# Patient Record
Sex: FEMALE | Race: WHITE | Employment: UNEMPLOYED | URBAN - METROPOLITAN AREA
[De-identification: names, ages, dates, MRNs, and addresses within clinical notes are randomized per-mention and may not be internally consistent; named-entity substitution may affect disease eponyms.]

---

## 2017-08-15 ENCOUNTER — HOSPITAL ENCOUNTER (EMERGENCY)
Age: 82
Discharge: HOME | End: 2017-08-15
Attending: EMERGENCY MEDICINE
Payer: MEDICARE

## 2017-08-15 ENCOUNTER — APPOINTMENT (OUTPATIENT)
Dept: GENERAL RADIOLOGY | Age: 82
End: 2017-08-15
Attending: EMERGENCY MEDICINE
Payer: MEDICARE

## 2017-08-15 VITALS
RESPIRATION RATE: 16 BRPM | TEMPERATURE: 98.1 F | HEIGHT: 60 IN | SYSTOLIC BLOOD PRESSURE: 169 MMHG | HEART RATE: 69 BPM | DIASTOLIC BLOOD PRESSURE: 77 MMHG | OXYGEN SATURATION: 98 % | BODY MASS INDEX: 27.29 KG/M2 | WEIGHT: 139 LBS

## 2017-08-15 DIAGNOSIS — R00.2 PALPITATIONS: Primary | ICD-10-CM

## 2017-08-15 LAB
ANION GAP BLD CALC-SCNC: 9 MMOL/L (ref 7–16)
ATRIAL RATE: 63 BPM
BASOPHILS # BLD AUTO: 0 K/UL (ref 0–0.2)
BASOPHILS # BLD: 0 % (ref 0–2)
BUN SERPL-MCNC: 10 MG/DL (ref 8–23)
CALCIUM SERPL-MCNC: 8.9 MG/DL (ref 8.3–10.4)
CALCULATED P AXIS, ECG09: 64 DEGREES
CALCULATED R AXIS, ECG10: 29 DEGREES
CALCULATED T AXIS, ECG11: 64 DEGREES
CHLORIDE SERPL-SCNC: 97 MMOL/L (ref 98–107)
CO2 SERPL-SCNC: 27 MMOL/L (ref 21–32)
CREAT SERPL-MCNC: 0.5 MG/DL (ref 0.6–1)
DIAGNOSIS, 93000: NORMAL
DIFFERENTIAL METHOD BLD: ABNORMAL
EOSINOPHIL # BLD: 0.1 K/UL (ref 0–0.8)
EOSINOPHIL NFR BLD: 1 % (ref 0.5–7.8)
ERYTHROCYTE [DISTWIDTH] IN BLOOD BY AUTOMATED COUNT: 13 % (ref 11.9–14.6)
GLUCOSE SERPL-MCNC: 141 MG/DL (ref 65–100)
HCT VFR BLD AUTO: 38 % (ref 35.8–46.3)
HGB BLD-MCNC: 13.2 G/DL (ref 11.7–15.4)
IMM GRANULOCYTES # BLD: 0 K/UL (ref 0–0.5)
IMM GRANULOCYTES NFR BLD AUTO: 0.2 % (ref 0–5)
INR PPP: 1 (ref 0.9–1.2)
LYMPHOCYTES # BLD AUTO: 14 % (ref 13–44)
LYMPHOCYTES # BLD: 0.9 K/UL (ref 0.5–4.6)
MAGNESIUM SERPL-MCNC: 1.9 MG/DL (ref 1.8–2.4)
MCH RBC QN AUTO: 30.8 PG (ref 26.1–32.9)
MCHC RBC AUTO-ENTMCNC: 34.7 G/DL (ref 31.4–35)
MCV RBC AUTO: 88.6 FL (ref 79.6–97.8)
MONOCYTES # BLD: 0.6 K/UL (ref 0.1–1.3)
MONOCYTES NFR BLD AUTO: 9 % (ref 4–12)
NEUTS SEG # BLD: 4.9 K/UL (ref 1.7–8.2)
NEUTS SEG NFR BLD AUTO: 76 % (ref 43–78)
P-R INTERVAL, ECG05: 160 MS
PLATELET # BLD AUTO: 204 K/UL (ref 150–450)
PMV BLD AUTO: 9.1 FL (ref 10.8–14.1)
POTASSIUM SERPL-SCNC: 4 MMOL/L (ref 3.5–5.1)
PROTHROMBIN TIME: 10.5 SEC (ref 9.6–12)
Q-T INTERVAL, ECG07: 408 MS
QRS DURATION, ECG06: 92 MS
QTC CALCULATION (BEZET), ECG08: 417 MS
RBC # BLD AUTO: 4.29 M/UL (ref 4.05–5.25)
SODIUM SERPL-SCNC: 133 MMOL/L (ref 136–145)
VENTRICULAR RATE, ECG03: 63 BPM
WBC # BLD AUTO: 6.5 K/UL (ref 4.3–11.1)

## 2017-08-15 PROCEDURE — 93005 ELECTROCARDIOGRAM TRACING: CPT | Performed by: EMERGENCY MEDICINE

## 2017-08-15 PROCEDURE — 85025 COMPLETE CBC W/AUTO DIFF WBC: CPT | Performed by: EMERGENCY MEDICINE

## 2017-08-15 PROCEDURE — 99284 EMERGENCY DEPT VISIT MOD MDM: CPT | Performed by: EMERGENCY MEDICINE

## 2017-08-15 PROCEDURE — 85610 PROTHROMBIN TIME: CPT | Performed by: EMERGENCY MEDICINE

## 2017-08-15 PROCEDURE — 80048 BASIC METABOLIC PNL TOTAL CA: CPT | Performed by: EMERGENCY MEDICINE

## 2017-08-15 PROCEDURE — 83735 ASSAY OF MAGNESIUM: CPT | Performed by: EMERGENCY MEDICINE

## 2017-08-15 PROCEDURE — 71010 XR CHEST PORT: CPT

## 2017-08-15 RX ORDER — BENAZEPRIL HYDROCHLORIDE 20 MG/1
5 TABLET ORAL DAILY
COMMUNITY
End: 2019-10-17

## 2017-08-15 NOTE — ED PROVIDER NOTES
HPI Comments: 51-year-old female presents with 5 nights of intermittent palpitations and dizziness. Spells last 10-15 minutes and occur mainly at nighttime. She has tingling to the fingertips and slight dyspnea. Patient has a history of atrial fibrillation in the past, and describes the above-mentioned spells as her heart \"racing\"      Patient is a 80 y.o. female presenting with dizziness. The history is provided by the patient. Dizziness   This is a new problem. The current episode started more than 2 days ago. The problem has not changed since onset. There was no focality noted. There has been no fever. Associated symptoms include shortness of breath. Pertinent negatives include no chest pain, no vomiting, no altered mental status, no confusion, no headaches and no nausea. Past Medical History:   Diagnosis Date    Atrial fibrillation ([address]     CAD (coronary artery disease)     afib    Chest pain 9/13/2016    Gastrointestinal disorder irritable bowel    Hypertension        Past Surgical History:   Procedure Laterality Date    HX CHOLECYSTECTOMY      Colostomy reversal    HX ORTHOPAEDIC      left hip replacement, rt pelvic fx    HX OTHER SURGICAL      Extensive abdominal surgical history         History reviewed. No pertinent family history. Social History     Social History    Marital status:      Spouse name: N/A    Number of children: N/A    Years of education: N/A     Occupational History    Not on file. Social History Main Topics    Smoking status: Never Smoker    Smokeless tobacco: Not on file    Alcohol use No    Drug use: Not on file    Sexual activity: No     Other Topics Concern    Not on file     Social History Narrative         ALLERGIES: Sulfa (sulfonamide antibiotics)    Review of Systems   Constitutional: Negative for chills and fever. HENT: Negative for rhinorrhea and sore throat. Eyes: Negative for discharge and redness.    Respiratory: Positive for shortness of breath. Negative for cough. Cardiovascular: Positive for palpitations. Negative for chest pain. Gastrointestinal: Negative for abdominal pain, diarrhea, nausea and vomiting. Musculoskeletal: Negative for arthralgias and back pain. Skin: Negative for rash. Neurological: Positive for dizziness, light-headedness and numbness. Negative for headaches. Psychiatric/Behavioral: Negative for confusion. All other systems reviewed and are negative. Vitals:    08/15/17 1136 08/15/17 1152 08/15/17 1211   BP: 168/72 162/72    Pulse: 63 62    Resp: 16     Temp: 98 °F (36.7 °C)     SpO2: 97%  97%   Weight: 63 kg (139 lb)     Height: 5' (1.524 m)              Physical Exam   Constitutional: She is oriented to person, place, and time. She appears well-developed and well-nourished. No distress. HENT:   Head: Normocephalic and atraumatic. Eyes: Conjunctivae and EOM are normal. Pupils are equal, round, and reactive to light. Right eye exhibits no discharge. Left eye exhibits no discharge. No scleral icterus. Neck: Normal range of motion. Neck supple. Cardiovascular: Normal rate, regular rhythm and normal heart sounds. Exam reveals no gallop. No murmur heard. Pulmonary/Chest: Effort normal and breath sounds normal. No respiratory distress. She has no wheezes. She has no rales. Abdominal: Soft. Bowel sounds are normal. There is no tenderness. There is no guarding. Musculoskeletal: Normal range of motion. She exhibits no edema. Neurological: She is alert and oriented to person, place, and time. She exhibits normal muscle tone. cni 2-12 grossly   Skin: Skin is warm and dry. She is not diaphoretic. Psychiatric: She has a normal mood and affect. Her behavior is normal.   Nursing note and vitals reviewed.        MDM  Number of Diagnoses or Management Options  Palpitations:   Diagnosis management comments: Medical decision making note:  Palpitations, intermittent, nonsustained  Negative workup in the ER  Case discussed with cardiology, proceed to office for 48 hour Holter placement  This concludes the \"medical decision making note\" part of this emergency department visit note.       ED Course       Procedures

## 2017-08-15 NOTE — DISCHARGE INSTRUCTIONS
Go to the office at [facility] cardiology, either the one in [facility], or the one on an [address] near the \"[place] on [address]. They will get you hooked up to a heart monitor you can wear for the next 48 hours    follow up with Dr. Karyle Reilly  Return to the ER if worse    Continue all medicines as currently prescribed           Palpitations: Care Instructions  Your Care Instructions    Heart palpitations are the uncomfortable sensation that your heart is beating fast or irregularly. You might feel pounding or fluttering in your chest. It might feel like your heart is skipping a beat. Although palpitations may be caused by a heart problem, they also occur because of stress, fatigue, or use of alcohol, caffeine, or nicotine. Many medicines, including diet pills, antihistamines, decongestants, and some herbal products, can cause heart palpitations. Nearly everyone has palpitations from time to time. Depending on your symptoms, your doctor may need to do more tests to try to find the cause of your palpitations. Follow-up care is a key part of your treatment and safety. Be sure to make and go to all appointments, and call your doctor if you are having problems. It's also a good idea to know your test results and keep a list of the medicines you take. How can you care for yourself at home? · Avoid caffeine, nicotine, and excess alcohol. · Do not take illegal drugs, such as methamphetamines and cocaine. · Do not take weight loss or diet medicines unless you talk with your doctor first.  · Get plenty of sleep. · Do not overeat. · If you have palpitations again, take deep breaths and try to relax. This may slow a racing heart. · If you start to feel lightheaded, lie down to avoid injuries that might result if you pass out and fall down. · Keep a record of your palpitations and bring it to your next doctor's appointment. Write down:  ¨ The date and time. ¨ Your pulse.  (If your heart is beating fast, it may be hard to count your pulse.)  ¨ What you were doing when the palpitations started. ¨ How long the palpitations lasted. ¨ Any other symptoms. · If an activity causes palpitations, slow down or stop. Talk to your doctor before you do that activity again. · Take your medicines exactly as prescribed. Call your doctor if you think you are having a problem with your medicine. When should you call for help? Call 911 anytime you think you may need emergency care. For example, call if:  · You passed out (lost consciousness). · You have symptoms of a heart attack. These may include:  ¨ Chest pain or pressure, or a strange feeling in the chest.  ¨ Sweating. ¨ Shortness of breath. ¨ Pain, pressure, or a strange feeling in the back, neck, jaw, or upper belly or in one or both shoulders or arms. ¨ Lightheadedness or sudden weakness. ¨ A fast or irregular heartbeat. After you call 911, the  may tell you to chew 1 adult-strength or 2 to 4 low-dose aspirin. Wait for an ambulance. Do not try to drive yourself. · You have symptoms of a stroke. These may include:  ¨ Sudden numbness, tingling, weakness, or loss of movement in your face, arm, or leg, especially on only one side of your body. ¨ Sudden vision changes. ¨ Sudden trouble speaking. ¨ Sudden confusion or trouble understanding simple statements. ¨ Sudden problems with walking or balance. ¨ A sudden, severe headache that is different from past headaches. Call your doctor now or seek immediate medical care if:  · You have heart palpitations and:  ¨ Are dizzy or lightheaded, or you feel like you may faint. ¨ Have new or increased shortness of breath. Watch closely for changes in your health, and be sure to contact your doctor if:  · You continue to have heart palpitations. Where can you learn more? Go to http://loretta-ro.info/. Enter R508 in the search box to learn more about \"Palpitations: Care Instructions. \"  Current as of: April 3, 2017  Content Version: 11.3  © 6967-6126 [employer]. Care instructions adapted under license by [employer] (which disclaims liability or warranty for this information). If you have questions about a medical condition or this instruction, always ask your healthcare professional. [address] any warranty or liability for your use of this information. Cardiac Arrhythmia: Care Instructions  Your Care Instructions    A cardiac arrhythmia is a change in the normal rhythm of the heart. Your heart may beat too fast or too slow or beat with an irregular or skipping rhythm. A change in the heart's rhythm may feel like a really strong heartbeat or a fluttering in your chest. A severe heart rhythm problem can keep the body from getting the blood it needs. This can result in shortness of breath, lightheadedness, and fainting. You may take medicine to treat your condition. Your doctor may recommend a pacemaker or recommend catheter ablation to destroy small parts of the heart that are causing a rhythm problem. Another possible treatment is an implantable cardioverter-defibrillator (ICD). An ICD is a device that gives the heart a shock to return the heart to a normal rhythm. Follow-up care is a key part of your treatment and safety. Be sure to make and go to all appointments, and call your doctor if you are having problems. It's also a good idea to know your test results and keep a list of the medicines you take. How can you care for yourself at home? General care  · Be safe with medicines. Take your medicines exactly as prescribed. Call your doctor if you think you are having a problem with your medicine. You will get more details on the specific medicines your doctor prescribes. · If you received a pacemaker or an ICD, you will get a fact sheet about it. · Wear medical alert jewelry that says you have an abnormal heart rhythm.  You can buy this at most drugstores. Lifestyle changes  · Eat a heart-healthy diet. · Stay at a healthy weight. Lose weight if you need to. · Avoid nicotine, too much alcohol, and illegal drugs (meth, speed, and cocaine). Also, get enough sleep and do not overeat. · Ask your doctor whether you can take over-the-counter medicines (such as decongestants). These can make your heart beat fast.  · Talk to your doctor about any limits to activities, such as driving, or tasks where you use power tools or ladders. Activity  · Start light exercise if your doctor says you can. Even a small amount will help you get stronger, have more energy, and manage your stress. · If your doctor recommends it, get more exercise. Walking is a good choice. Bit by bit, increase the amount you walk every day. Try for at least 30 minutes on most days of the week. You also may want to swim, bike, or do other activities. · When you exercise, watch for signs that your heart is working too hard. You are pushing too hard if you cannot talk while you exercise. If you become short of breath or dizzy or have chest pain, sit down and rest.  · Check your pulse daily. Place two fingers on the artery at the palm side of your wrist, in line with your thumb. If your heartbeat seems uneven, talk to your doctor. When should you call for help? Call 911 anytime you think you may need emergency care. For example, call if:  · You passed out (lost consciousness). · You have symptoms of a heart attack. These may include:  ¨ Chest pain or pressure, or a strange feeling in the chest.  ¨ Sweating. ¨ Shortness of breath. ¨ Nausea or vomiting. ¨ Pain, pressure, or a strange feeling in the back, neck, jaw, or upper belly or in one or both shoulders or arms. ¨ Lightheadedness or sudden weakness. ¨ A fast or irregular heartbeat. After you call 911, the  may tell you to chew 1 adult-strength or 2 to 4 low-dose aspirin. Wait for an ambulance.  Do not try to drive yourself. · You have signs of a stroke. These include:  ¨ Sudden numbness, paralysis, or weakness in your face, arm, or leg, especially on only one side of your body. ¨ New problems with walking or balance. ¨ Sudden vision changes. ¨ Drooling or slurred speech. ¨ New problems speaking or understanding simple statements, or feeling confused. ¨ A sudden, severe headache that is different from past headaches. Call your doctor now or seek immediate medical care if:  · You are dizzy or lightheaded, or you feel like you may faint. · You have new or increased shortness of breath. · You had surgery and you have signs of infection, such as:  ¨ Increased pain, swelling, warmth, or redness. ¨ Red streaks leading from the cut (incision). ¨ Pus draining from the incision. ¨ A fever. Watch closely for changes in your health, and be sure to contact your doctor if:  · You do not get better as expected. Where can you learn more? Go to http://loretta-ro.info/. Enter N782 in the search box to learn more about \"Cardiac Arrhythmia: Care Instructions. \"  Current as of: May 5, 2016  Content Version: 11.3  © 2435-1442 [employer]. Care instructions adapted under license by [employer] (which disclaims liability or warranty for this information). If you have questions about a medical condition or this instruction, always ask your healthcare professional. [place] [address] any warranty or liability for your use of this information.

## 2017-08-15 NOTE — ED TRIAGE NOTES
Pt states she has had dizziness for about five days and has a hx of Afib. States she was back in a normal rhythm for awhile but feels she may be back in the rhythm now. Pt of [facility] but has not had to see them in about one year.

## 2017-08-15 NOTE — ED NOTES
Pt states she feels dizzy at night, She states during the day she is fine. She states after she takes her pills, she feels fine and is not dizzy.

## 2017-09-14 ENCOUNTER — APPOINTMENT (OUTPATIENT)
Dept: CT IMAGING | Age: 82
End: 2017-09-14
Attending: EMERGENCY MEDICINE
Payer: MEDICARE

## 2017-09-14 ENCOUNTER — HOSPITAL ENCOUNTER (EMERGENCY)
Age: 82
Discharge: HOME | End: 2017-09-14
Attending: EMERGENCY MEDICINE
Payer: MEDICARE

## 2017-09-14 VITALS
BODY MASS INDEX: 26.43 KG/M2 | RESPIRATION RATE: 19 BRPM | WEIGHT: 140 LBS | SYSTOLIC BLOOD PRESSURE: 175 MMHG | HEIGHT: 61 IN | TEMPERATURE: 98.1 F | HEART RATE: 71 BPM | OXYGEN SATURATION: 95 % | DIASTOLIC BLOOD PRESSURE: 71 MMHG

## 2017-09-14 DIAGNOSIS — M54.50 ACUTE RIGHT-SIDED LOW BACK PAIN WITHOUT SCIATICA: Primary | ICD-10-CM

## 2017-09-14 DIAGNOSIS — R10.31 ABDOMINAL PAIN, RIGHT LOWER QUADRANT: ICD-10-CM

## 2017-09-14 LAB
ALBUMIN SERPL-MCNC: 3.7 G/DL (ref 3.2–4.6)
ALBUMIN/GLOB SERPL: 0.9 {RATIO} (ref 1.2–3.5)
ALP SERPL-CCNC: 110 U/L (ref 50–136)
ALT SERPL-CCNC: 25 U/L (ref 12–65)
ANION GAP SERPL CALC-SCNC: 7 MMOL/L (ref 7–16)
AST SERPL-CCNC: 26 U/L (ref 15–37)
BASOPHILS # BLD: 0 K/UL (ref 0–0.2)
BASOPHILS NFR BLD: 0 % (ref 0–2)
BILIRUB SERPL-MCNC: 0.5 MG/DL (ref 0.2–1.1)
BUN SERPL-MCNC: 10 MG/DL (ref 8–23)
CALCIUM SERPL-MCNC: 8.9 MG/DL (ref 8.3–10.4)
CHLORIDE SERPL-SCNC: 97 MMOL/L (ref 98–107)
CO2 SERPL-SCNC: 27 MMOL/L (ref 21–32)
CREAT SERPL-MCNC: 0.7 MG/DL (ref 0.6–1)
DIFFERENTIAL METHOD BLD: ABNORMAL
EOSINOPHIL # BLD: 0 K/UL (ref 0–0.8)
EOSINOPHIL NFR BLD: 0 % (ref 0.5–7.8)
ERYTHROCYTE [DISTWIDTH] IN BLOOD BY AUTOMATED COUNT: 13.2 % (ref 11.9–14.6)
GLOBULIN SER CALC-MCNC: 4.1 G/DL (ref 2.3–3.5)
GLUCOSE SERPL-MCNC: 186 MG/DL (ref 65–100)
HCT VFR BLD AUTO: 37.9 % (ref 35.8–46.3)
HGB BLD-MCNC: 13 G/DL (ref 11.7–15.4)
IMM GRANULOCYTES # BLD: 0 K/UL (ref 0–0.5)
IMM GRANULOCYTES NFR BLD: 0.5 % (ref 0–5)
LIPASE SERPL-CCNC: 212 U/L (ref 73–393)
LYMPHOCYTES # BLD: 1 K/UL (ref 0.5–4.6)
LYMPHOCYTES NFR BLD: 12 % (ref 13–44)
MCH RBC QN AUTO: 30.9 PG (ref 26.1–32.9)
MCHC RBC AUTO-ENTMCNC: 34.3 G/DL (ref 31.4–35)
MCV RBC AUTO: 90 FL (ref 79.6–97.8)
MONOCYTES # BLD: 0.4 K/UL (ref 0.1–1.3)
MONOCYTES NFR BLD: 5 % (ref 4–12)
NEUTS SEG # BLD: 7.2 K/UL (ref 1.7–8.2)
NEUTS SEG NFR BLD: 83 % (ref 43–78)
PLATELET # BLD AUTO: 236 K/UL (ref 150–450)
PMV BLD AUTO: 9.3 FL (ref 10.8–14.1)
POTASSIUM SERPL-SCNC: 4 MMOL/L (ref 3.5–5.1)
PROT SERPL-MCNC: 7.8 G/DL (ref 6.3–8.2)
RBC # BLD AUTO: 4.21 M/UL (ref 4.05–5.25)
SODIUM SERPL-SCNC: 131 MMOL/L (ref 136–145)
WBC # BLD AUTO: 8.7 K/UL (ref 4.3–11.1)

## 2017-09-14 PROCEDURE — 85025 COMPLETE CBC W/AUTO DIFF WBC: CPT | Performed by: EMERGENCY MEDICINE

## 2017-09-14 PROCEDURE — 81003 URINALYSIS AUTO W/O SCOPE: CPT | Performed by: EMERGENCY MEDICINE

## 2017-09-14 PROCEDURE — 83690 ASSAY OF LIPASE: CPT | Performed by: EMERGENCY MEDICINE

## 2017-09-14 PROCEDURE — 74011636320 HC RX REV CODE- 636/320: Performed by: EMERGENCY MEDICINE

## 2017-09-14 PROCEDURE — 74011250636 HC RX REV CODE- 250/636: Performed by: EMERGENCY MEDICINE

## 2017-09-14 PROCEDURE — 80053 COMPREHEN METABOLIC PANEL: CPT | Performed by: EMERGENCY MEDICINE

## 2017-09-14 PROCEDURE — 96374 THER/PROPH/DIAG INJ IV PUSH: CPT | Performed by: EMERGENCY MEDICINE

## 2017-09-14 PROCEDURE — 74177 CT ABD & PELVIS W/CONTRAST: CPT

## 2017-09-14 PROCEDURE — 99284 EMERGENCY DEPT VISIT MOD MDM: CPT | Performed by: EMERGENCY MEDICINE

## 2017-09-14 PROCEDURE — 74011000258 HC RX REV CODE- 258: Performed by: EMERGENCY MEDICINE

## 2017-09-14 PROCEDURE — 96361 HYDRATE IV INFUSION ADD-ON: CPT | Performed by: EMERGENCY MEDICINE

## 2017-09-14 RX ORDER — SODIUM CHLORIDE 0.9 % (FLUSH) 0.9 %
10 SYRINGE (ML) INJECTION
Status: COMPLETED | OUTPATIENT
Start: 2017-09-14 | End: 2017-09-14

## 2017-09-14 RX ORDER — MORPHINE SULFATE 2 MG/ML
4 INJECTION, SOLUTION INTRAMUSCULAR; INTRAVENOUS ONCE
Status: DISCONTINUED | OUTPATIENT
Start: 2017-09-14 | End: 2017-09-14 | Stop reason: HOSPADM

## 2017-09-14 RX ORDER — POLYETHYLENE GLYCOL 3350 17 G/17G
17 POWDER, FOR SOLUTION ORAL DAILY
Qty: 238 G | Refills: 0 | Status: SHIPPED | OUTPATIENT
Start: 2017-09-14

## 2017-09-14 RX ADMIN — SODIUM CHLORIDE 100 ML: 900 INJECTION, SOLUTION INTRAVENOUS at 10:28

## 2017-09-14 RX ADMIN — SODIUM CHLORIDE 1000 ML: 900 INJECTION, SOLUTION INTRAVENOUS at 09:52

## 2017-09-14 RX ADMIN — IOPAMIDOL 100 ML: 755 INJECTION, SOLUTION INTRAVENOUS at 10:28

## 2017-09-14 RX ADMIN — Medication 10 ML: at 10:28

## 2017-09-14 NOTE — ED PROVIDER NOTES
HPI:  80 female A. Fib, CAD, IBS, diverticulitis, prior bowel resection is here with right lower back pain, abdominal pain that started 3 days ago. Pain worsened with movement. Has not had bowel movement for 3 days. Denies any urinary symptoms. Denies any saddle paresthesia. No weakness in the lower extremity. Non-blood thinner. Denies any nausea, vomiting. ROS  Constitutional: No fever, no chills  Skin: no rash  Eye: No vision changes  ENMT: No sore throat, no congestion  Respiratory: No shortness of breath, no cough  Cardiovascular: No chest pain, no palpitations  Gastrointestinal: No vomiting, no nausea, no diarrhea, + constipation, no rectal bleeding, + abdominal pain  : No dysuria, no hematuria  MSK: + back pain, no muscle pain, no joint pain  Neuro: No headache, no change in mental status, no numbness, no tingling, no weakness  Psych: No anxiety, no depression  Endocrine: No hyperglycemia  All other review of systems positive per history of present illness and the above otherwise negative or noncontributory. Visit Vitals    /88 (BP 1 Location: Left arm, BP Patient Position: At rest)    Pulse 75    Temp 98 °F (36.7 °C)    Resp 19    Ht 5' 1\" (1.549 m)    Wt 63.5 kg (140 lb)    SpO2 97%    BMI 26.45 kg/m2     Past Medical History:   Diagnosis Date    Atrial fibrillation (HCC)     CAD (coronary artery disease)     afib    Chest pain 9/13/2016    Gastrointestinal disorder irritable bowel    Hypertension      Past Surgical History:   Procedure Laterality Date    HX CHOLECYSTECTOMY      Colostomy reversal    HX ORTHOPAEDIC      left hip replacement, rt pelvic fx    HX OTHER SURGICAL      Extensive abdominal surgical history     Prior to Admission Medications   Prescriptions Last Dose Informant Patient Reported? Taking? VIT C/YASMINE AC/LUT/COPPER/ZNOX (PRESERVISION LUTEIN PO)   Yes Yes   Sig: Take  by mouth.    amLODIPine (NORVASC) 5 mg tablet   Yes Yes   Sig: Take 5 mg by mouth daily.   aspirin delayed-release 81 mg tablet   Yes Yes   Sig: Take 325 mg by mouth daily. benazepril (LOTENSIN) 20 mg tablet   Yes Yes   Sig: Take 5 mg by mouth daily. esomeprazole (NEXIUM) 40 mg capsule   Yes Yes   Sig: Take  by mouth daily. linaclotide (LINZESS) 145 mcg cap capsule   Yes Yes   Sig: Take 145 mcg by mouth Daily (before breakfast). metoprolol succinate (TOPROL-XL) 50 mg XL tablet   No Yes   Sig: Take 1 Tab by mouth daily. Indications: HYPERTENSION   Patient taking differently: Take 100 mg by mouth daily. Indications: Hypertension      Facility-Administered Medications: None         Adult Exam   General: alert, no acute distress  Head: normocephalic, atraumatic  ENT: moist mucous membranes  Neck: supple, non-tender; full range of motion  Cardiovascular: regular rate and rhythm, normal peripheral perfusion, no edema  Respiratory: lungs are clear to auscultation; normal respirations; no wheezing, rales or rhonchi  Gastrointestinal: soft, pulsatile mass. She has tenderness to palpation over the right lower abdomen; or tinnitus. no rebound or guarding, no peritoneal signs, no distension. + bowel sounds  Back: no midline cervical, thoracic, lumbar spine tenderness or steps-off. Rt paralumbar pain on palpation. Range of motion is limited due to pain. Straight leg raise Negative   No saddle paresthesia. Sensation and bilateral lower extremities are intact. 2+ and Equal dorsalis pedis, femoral pulses. Bilateral patella and Achilles tendons 2/4 and equal. no spasticity    Musculoskeletal: normal range of motion, normal strength, no gross deformities  Neurological: alert and oriented x 4, no gross focal deficits; normal speech  Psychiatric: cooperative; appropriate mood and affect    MDM: suspect likely musculoskeletal back pain. Patient stated she was cleaning up the sidewalk in front of her apartment because her neighbor left really messy and she had just come in to her apartment.   Low suspicion for dissection. However she is also having constipation, abdominal pain. Given her age group, prior surgery with no bowel movement we'll obtain CT scan for evaluation of diverticulitis. No distention. Low suspicion for obstruction at this time. She also had an focal pain in the right lower quadrant. CT shows a help us evaluate for possible appendicitis. Ct Abd Pelv W Cont    Result Date: 9/14/2017  ABDOMINAL CT WITH CONTRAST: 9/14/2017 Indication: Diverticulitis Comparison: 4/13/2007  100cc of optiray 350   was injected to aid in the detection of abdominal and pelvic pathology. The MAA was adjusted using dose modulation to reduce patient radiation exposure. Findings: The lung bases are clear  . The patient is status post cholecystectomy and pneumobilia is again noted. The adrenal glands, spleen, and pancreas are unremarkable. Kidneys display multiple subcentimeter bilateral renal cysts. There is mild fluid and stool distention of the ascending and transverse colon. There are sutures in the area of the hepatic flexure. Degenerative lumbar scoliosis is noted. There is no significant adenopathy. CT scan of pelvis with contrast: There is streak artifact from the bilateral hip arthroplasties with obscure the pelvic anatomy. The bladder is unremarkable. There is a remote fracture of the right ischial ramus. Patient appears be status post hysterectomy. Mild sigmoid diverticulosis is noted.      IMPRESSION: Status post post ascending partial colectomy, hysterectomy, cholecystectomy, sigmoid diverticulosis, small bilateral renal cysts    Recent Results (from the past 8 hour(s))   CBC WITH AUTOMATED DIFF    Collection Time: 09/14/17  9:37 AM   Result Value Ref Range    WBC 8.7 4.3 - 11.1 K/uL    RBC 4.21 4.05 - 5.25 M/uL    HGB 13.0 11.7 - 15.4 g/dL    HCT 37.9 35.8 - 46.3 %    MCV 90.0 79.6 - 97.8 FL    MCH 30.9 26.1 - 32.9 PG    MCHC 34.3 31.4 - 35.0 g/dL    RDW 13.2 11.9 - 14.6 %    PLATELET 259 438 - 450 K/uL    MPV 9.3 (L) 10.8 - 14.1 FL    DF AUTOMATED      NEUTROPHILS 83 (H) 43 - 78 %    LYMPHOCYTES 12 (L) 13 - 44 %    MONOCYTES 5 4.0 - 12.0 %    EOSINOPHILS 0 (L) 0.5 - 7.8 %    BASOPHILS 0 0.0 - 2.0 %    IMMATURE GRANULOCYTES 0.5 0.0 - 5.0 %    ABS. NEUTROPHILS 7.2 1.7 - 8.2 K/UL    ABS. LYMPHOCYTES 1.0 0.5 - 4.6 K/UL    ABS. MONOCYTES 0.4 0.1 - 1.3 K/UL    ABS. EOSINOPHILS 0.0 0.0 - 0.8 K/UL    ABS. BASOPHILS 0.0 0.0 - 0.2 K/UL    ABS. IMM. GRANS. 0.0 0.0 - 0.5 K/UL   METABOLIC PANEL, COMPREHENSIVE    Collection Time: 09/14/17  9:37 AM   Result Value Ref Range    Sodium 131 (L) 136 - 145 mmol/L    Potassium 4.0 3.5 - 5.1 mmol/L    Chloride 97 (L) 98 - 107 mmol/L    CO2 27 21 - 32 mmol/L    Anion gap 7 7 - 16 mmol/L    Glucose 186 (H) 65 - 100 mg/dL    BUN 10 8 - 23 MG/DL    Creatinine 0.70 0.6 - 1.0 MG/DL    GFR est AA >60 >60 ml/min/1.73m2    GFR est non-AA >60 >60 ml/min/1.73m2    Calcium 8.9 8.3 - 10.4 MG/DL    Bilirubin, total 0.5 0.2 - 1.1 MG/DL    ALT (SGPT) 25 12 - 65 U/L    AST (SGOT) 26 15 - 37 U/L    Alk. phosphatase 110 50 - 136 U/L    Protein, total 7.8 6.3 - 8.2 g/dL    Albumin 3.7 3.2 - 4.6 g/dL    Globulin 4.1 (H) 2.3 - 3.5 g/dL    A-G Ratio 0.9 (L) 1.2 - 3.5     LIPASE    Collection Time: 09/14/17  9:37 AM   Result Value Ref Range    Lipase 212 73 - 393 U/L     Urine dip neg   Labs unremarkable. CT neg. Pathology. Suspect musculoskeletal pain. She has constipation. Recommend Colace and MiraLAX. Her labs are unremarkable. No Signs of any acute intra-abdominal surgical pathology, obstructions or infectious ideology. Low susp for ischemic bowel. We'll discharge home at this time with typical back pain and abdominal pain precautions. Dragon voice recognition software was used to create this note. Although the note has been reviewed and corrected where necessary, additional errors may have been overlooked and remain in the text.

## 2017-09-14 NOTE — DISCHARGE INSTRUCTIONS
Abdominal Pain: Care Instructions  Your Care Instructions    Abdominal pain has many possible causes. Some aren't serious and get better on their own in a few days. Others need more testing and treatment. If your pain continues or gets worse, you need to be rechecked and may need more tests to find out what is wrong. You may need surgery to correct the problem. Don't ignore new symptoms, such as fever, nausea and vomiting, urination problems, pain that gets worse, and dizziness. These may be signs of a more serious problem. Your doctor may have recommended a follow-up visit in the next 8 to 12 hours. If you are not getting better, you may need more tests or treatment. The doctor has checked you carefully, but problems can develop later. If you notice any problems or new symptoms, get medical treatment right away. Follow-up care is a key part of your treatment and safety. Be sure to make and go to all appointments, and call your doctor if you are having problems. It's also a good idea to know your test results and keep a list of the medicines you take. How can you care for yourself at home? · Rest until you feel better. · To prevent dehydration, drink plenty of fluids, enough so that your urine is light yellow or clear like water. Choose water and other caffeine-free clear liquids until you feel better. If you have kidney, heart, or liver disease and have to limit fluids, talk with your doctor before you increase the amount of fluids you drink. · If your stomach is upset, eat mild foods, such as rice, dry toast or crackers, bananas, and applesauce. Try eating several small meals instead of two or three large ones. · Wait until 48 hours after all symptoms have gone away before you have spicy foods, alcohol, and drinks that contain caffeine. · Do not eat foods that are high in fat. · Avoid anti-inflammatory medicines such as aspirin, ibuprofen (Advil, Motrin), and naproxen (Aleve).  These can cause stomach upset. Talk to your doctor if you take daily aspirin for another health problem. When should you call for help? Call 911 anytime you think you may need emergency care. For example, call if:  · You passed out (lost consciousness). · You pass maroon or very bloody stools. · You vomit blood or what looks like coffee grounds. · You have new, severe belly pain. Call your doctor now or seek immediate medical care if:  · Your pain gets worse, especially if it becomes focused in one area of your belly. · You have a new or higher fever. · Your stools are black and look like tar, or they have streaks of blood. · You have unexpected vaginal bleeding. · You have symptoms of a urinary tract infection. These may include:  ¨ Pain when you urinate. ¨ Urinating more often than usual.  ¨ Blood in your urine. · You are dizzy or lightheaded, or you feel like you may faint. Watch closely for changes in your health, and be sure to contact your doctor if:  · You are not getting better after 1 day (24 hours). Where can you learn more? Go to http://lorettaJDLabro.info/. Enter L214 in the search box to learn more about \"Abdominal Pain: Care Instructions. \"  Current as of: March 20, 2017  Content Version: 11.3  © 5018-7909 [employer]. Care instructions adapted under license by [employer] (which disclaims liability or warranty for this information). If you have questions about a medical condition or this instruction, always ask your healthcare professional. [place] [address] any warranty or liability for your use of this information. Back Pain: Care Instructions  Your Care Instructions    Back pain has many possible causes. It is often related to problems with muscles and ligaments of the back. It may also be related to problems with the nerves, discs, or bones of the back.  Moving, lifting, standing, sitting, or sleeping in an awkward way can strain the back. Sometimes you don't notice the injury until later. Arthritis is another common cause of back pain. Although it may hurt a lot, back pain usually improves on its own within several weeks. Most people recover in 12 weeks or less. Using good home treatment and being careful not to stress your back can help you feel better sooner. Follow-up care is a key part of your treatment and safety. Be sure to make and go to all appointments, and call your doctor if you are having problems. Its also a good idea to know your test results and keep a list of the medicines you take. How can you care for yourself at home? · Sit or lie in positions that are most comfortable and reduce your pain. Try one of these positions when you lie down:  ¨ Lie on your back with your knees bent and supported by large pillows. ¨ Lie on the floor with your legs on the seat of a sofa or chair. Emilee Naz on your side with your knees and hips bent and a pillow between your legs. ¨ Lie on your stomach if it does not make pain worse. · Do not sit up in bed, and avoid soft couches and twisted positions. Bed rest can help relieve pain at first, but it delays healing. Avoid bed rest after the first day of back pain. · Change positions every 30 minutes. If you must sit for long periods of time, take breaks from sitting. Get up and walk around, or lie in a comfortable position. · Try using a heating pad on a low or medium setting for 15 to 20 minutes every 2 or 3 hours. Try a warm shower in place of one session with the heating pad. · You can also try an ice pack for 10 to 15 minutes every 2 to 3 hours. Put a thin cloth between the ice pack and your skin. · Take pain medicines exactly as directed. ¨ If the doctor gave you a prescription medicine for pain, take it as prescribed. ¨ If you are not taking a prescription pain medicine, ask your doctor if you can take an over-the-counter medicine. · Take short walks several times a day.  You can start with 5 to 10 minutes, 3 or 4 times a day, and work up to longer walks. Walk on level surfaces and avoid hills and stairs until your back is better. · Return to work and other activities as soon as you can. Continued rest without activity is usually not good for your back. · To prevent future back pain, do exercises to stretch and strengthen your back and stomach. Learn how to use good posture, safe lifting techniques, and proper body mechanics. When should you call for help? Call your doctor now or seek immediate medical care if:  · You have new or worsening numbness in your legs. · You have new or worsening weakness in your legs. (This could make it hard to stand up.)  · You lose control of your bladder or bowels. Watch closely for changes in your health, and be sure to contact your doctor if:  · Your pain gets worse. · You are not getting better after 2 weeks. Where can you learn more? Go to http://loretta-ro.info/. Enter E502 in the search box to learn more about \"Back Pain: Care Instructions. \"  Current as of: March 21, 2017  Content Version: 11.3  © 2305-0562 [employer]. Care instructions adapted under license by [employer] (which disclaims liability or warranty for this information). If you have questions about a medical condition or this instruction, always ask your healthcare professional. [address] any warranty or liability for your use of this information.

## 2017-09-14 NOTE — ED TRIAGE NOTES
Pt in c/o lower back pain and abdominal pain x 3 days. States no bowel movement in 3 days. States pain worse with movement. Denies urinary symptoms. States history of abdominal surgeries.

## 2018-02-05 ENCOUNTER — HOSPITAL ENCOUNTER (OUTPATIENT)
Dept: LAB | Age: 83
Discharge: HOME | End: 2018-02-05
Attending: INTERNAL MEDICINE
Payer: MEDICARE

## 2018-02-05 LAB
ANION GAP SERPL CALC-SCNC: 9 MMOL/L (ref 7–16)
BUN SERPL-MCNC: 9 MG/DL (ref 8–23)
CALCIUM SERPL-MCNC: 8.7 MG/DL (ref 8.3–10.4)
CHLORIDE SERPL-SCNC: 92 MMOL/L (ref 98–107)
CO2 SERPL-SCNC: 29 MMOL/L (ref 21–32)
CREAT SERPL-MCNC: 0.5 MG/DL (ref 0.6–1)
GLUCOSE SERPL-MCNC: 132 MG/DL (ref 65–100)
MAGNESIUM SERPL-MCNC: 1.5 MG/DL (ref 1.8–2.4)
POTASSIUM SERPL-SCNC: 3.7 MMOL/L (ref 3.5–5.1)
SODIUM SERPL-SCNC: 130 MMOL/L (ref 136–145)

## 2018-02-05 PROCEDURE — 36415 COLL VENOUS BLD VENIPUNCTURE: CPT | Performed by: INTERNAL MEDICINE

## 2018-02-05 PROCEDURE — 80048 BASIC METABOLIC PNL TOTAL CA: CPT | Performed by: INTERNAL MEDICINE

## 2018-02-05 PROCEDURE — 83735 ASSAY OF MAGNESIUM: CPT | Performed by: INTERNAL MEDICINE

## 2018-02-08 ENCOUNTER — HOSPITAL ENCOUNTER (INPATIENT)
Age: 83
LOS: 3 days | Discharge: NURSING, REHAB OR LONG-TERM CARE | DRG: 291 | End: 2018-02-12
Admitting: INTERNAL MEDICINE
Payer: MEDICARE

## 2018-02-08 ENCOUNTER — APPOINTMENT (OUTPATIENT)
Dept: GENERAL RADIOLOGY | Age: 83
DRG: 291 | End: 2018-02-08
Attending: EMERGENCY MEDICINE
Payer: MEDICARE

## 2018-02-08 DIAGNOSIS — E87.1 HYPONATREMIA: Primary | ICD-10-CM

## 2018-02-08 LAB
LACTATE BLD-SCNC: 0.9 MMOL/L (ref 0.5–1.9)
TROPONIN I BLD-MCNC: 0.02 NG/ML (ref 0.02–0.05)

## 2018-02-08 PROCEDURE — 83880 ASSAY OF NATRIURETIC PEPTIDE: CPT

## 2018-02-08 PROCEDURE — 83605 ASSAY OF LACTIC ACID: CPT

## 2018-02-08 PROCEDURE — 71045 X-RAY EXAM CHEST 1 VIEW: CPT

## 2018-02-08 PROCEDURE — 51702 INSERT TEMP BLADDER CATH: CPT

## 2018-02-08 PROCEDURE — 93005 ELECTROCARDIOGRAM TRACING: CPT | Performed by: EMERGENCY MEDICINE

## 2018-02-08 PROCEDURE — 84484 ASSAY OF TROPONIN QUANT: CPT

## 2018-02-08 PROCEDURE — 85025 COMPLETE CBC W/AUTO DIFF WBC: CPT | Performed by: EMERGENCY MEDICINE

## 2018-02-08 PROCEDURE — 74011250636 HC RX REV CODE- 250/636

## 2018-02-08 PROCEDURE — 99285 EMERGENCY DEPT VISIT HI MDM: CPT

## 2018-02-08 PROCEDURE — 80053 COMPREHEN METABOLIC PANEL: CPT | Performed by: EMERGENCY MEDICINE

## 2018-02-08 PROCEDURE — 96374 THER/PROPH/DIAG INJ IV PUSH: CPT

## 2018-02-08 RX ORDER — MORPHINE SULFATE 2 MG/ML
4 INJECTION, SOLUTION INTRAMUSCULAR; INTRAVENOUS ONCE
Status: COMPLETED | OUTPATIENT
Start: 2018-02-08 | End: 2018-02-08

## 2018-02-08 RX ORDER — SODIUM CHLORIDE 0.9 % (FLUSH) 0.9 %
5-10 SYRINGE (ML) INJECTION EVERY 8 HOURS
Status: DISCONTINUED | OUTPATIENT
Start: 2018-02-08 | End: 2018-02-12 | Stop reason: HOSPADM

## 2018-02-08 RX ORDER — SODIUM CHLORIDE 0.9 % (FLUSH) 0.9 %
5-10 SYRINGE (ML) INJECTION AS NEEDED
Status: DISCONTINUED | OUTPATIENT
Start: 2018-02-08 | End: 2018-02-12 | Stop reason: HOSPADM

## 2018-02-08 RX ADMIN — MORPHINE SULFATE 4 MG: 2 INJECTION, SOLUTION INTRAMUSCULAR; INTRAVENOUS at 23:23

## 2018-02-09 PROBLEM — E87.1 HYPONATREMIA: Status: ACTIVE | Noted: 2018-02-09

## 2018-02-09 PROBLEM — J81.0 ACUTE PULMONARY EDEMA (HCC): Status: ACTIVE | Noted: 2018-02-09

## 2018-02-09 PROBLEM — J96.01 ACUTE RESPIRATORY FAILURE WITH HYPOXIA (HCC): Status: ACTIVE | Noted: 2018-02-09

## 2018-02-09 LAB
ALBUMIN SERPL-MCNC: 3.5 G/DL (ref 3.2–4.6)
ALBUMIN/GLOB SERPL: 1.1 {RATIO} (ref 1.2–3.5)
ALP SERPL-CCNC: 143 U/L (ref 50–136)
ALT SERPL-CCNC: 30 U/L (ref 12–65)
ANION GAP SERPL CALC-SCNC: 6 MMOL/L (ref 7–16)
ANION GAP SERPL CALC-SCNC: 8 MMOL/L (ref 7–16)
AST SERPL-CCNC: 32 U/L (ref 15–37)
ATRIAL RATE: 174 BPM
BASOPHILS # BLD: 0 K/UL (ref 0–0.2)
BASOPHILS NFR BLD: 0 % (ref 0–2)
BILIRUB SERPL-MCNC: 0.7 MG/DL (ref 0.2–1.1)
BNP SERPL-MCNC: 394 PG/ML
BUN SERPL-MCNC: 6 MG/DL (ref 8–23)
BUN SERPL-MCNC: 8 MG/DL (ref 8–23)
CALCIUM SERPL-MCNC: 8.7 MG/DL (ref 8.3–10.4)
CALCIUM SERPL-MCNC: 8.7 MG/DL (ref 8.3–10.4)
CALCULATED R AXIS, ECG10: 50 DEGREES
CALCULATED T AXIS, ECG11: 71 DEGREES
CHLORIDE SERPL-SCNC: 88 MMOL/L (ref 98–107)
CHLORIDE SERPL-SCNC: 91 MMOL/L (ref 98–107)
CO2 SERPL-SCNC: 30 MMOL/L (ref 21–32)
CO2 SERPL-SCNC: 30 MMOL/L (ref 21–32)
CREAT SERPL-MCNC: 0.43 MG/DL (ref 0.6–1)
CREAT SERPL-MCNC: 0.52 MG/DL (ref 0.6–1)
DIAGNOSIS, 93000: NORMAL
DIFFERENTIAL METHOD BLD: ABNORMAL
EOSINOPHIL # BLD: 0 K/UL (ref 0–0.8)
EOSINOPHIL NFR BLD: 1 % (ref 0.5–7.8)
ERYTHROCYTE [DISTWIDTH] IN BLOOD BY AUTOMATED COUNT: 12.4 % (ref 11.9–14.6)
GLOBULIN SER CALC-MCNC: 3.1 G/DL (ref 2.3–3.5)
GLUCOSE SERPL-MCNC: 130 MG/DL (ref 65–100)
GLUCOSE SERPL-MCNC: 170 MG/DL (ref 65–100)
HCT VFR BLD AUTO: 34.3 % (ref 35.8–46.3)
HGB BLD-MCNC: 12.1 G/DL (ref 11.7–15.4)
IMM GRANULOCYTES # BLD: 0 K/UL (ref 0–0.5)
IMM GRANULOCYTES NFR BLD AUTO: 0 % (ref 0–5)
LYMPHOCYTES # BLD: 0.3 K/UL (ref 0.5–4.6)
LYMPHOCYTES NFR BLD: 5 % (ref 13–44)
MAGNESIUM SERPL-MCNC: 1.6 MG/DL (ref 1.8–2.4)
MCH RBC QN AUTO: 30.8 PG (ref 26.1–32.9)
MCHC RBC AUTO-ENTMCNC: 35.3 G/DL (ref 31.4–35)
MCV RBC AUTO: 87.3 FL (ref 79.6–97.8)
MONOCYTES # BLD: 0.7 K/UL (ref 0.1–1.3)
MONOCYTES NFR BLD: 13 % (ref 4–12)
NEUTS SEG # BLD: 4.4 K/UL (ref 1.7–8.2)
NEUTS SEG NFR BLD: 81 % (ref 43–78)
PLATELET # BLD AUTO: 227 K/UL (ref 150–450)
PMV BLD AUTO: 8.8 FL (ref 10.8–14.1)
POTASSIUM SERPL-SCNC: 3.2 MMOL/L (ref 3.5–5.1)
POTASSIUM SERPL-SCNC: 4.2 MMOL/L (ref 3.5–5.1)
PROT SERPL-MCNC: 6.6 G/DL (ref 6.3–8.2)
Q-T INTERVAL, ECG07: 394 MS
QRS DURATION, ECG06: 96 MS
QTC CALCULATION (BEZET), ECG08: 434 MS
RBC # BLD AUTO: 3.93 M/UL (ref 4.05–5.25)
SODIUM SERPL-SCNC: 126 MMOL/L (ref 136–145)
SODIUM SERPL-SCNC: 127 MMOL/L (ref 136–145)
VENTRICULAR RATE, ECG03: 73 BPM
WBC # BLD AUTO: 5.5 K/UL (ref 4.3–11.1)

## 2018-02-09 PROCEDURE — 97165 OT EVAL LOW COMPLEX 30 MIN: CPT

## 2018-02-09 PROCEDURE — 74011250636 HC RX REV CODE- 250/636

## 2018-02-09 PROCEDURE — 93306 TTE W/DOPPLER COMPLETE: CPT

## 2018-02-09 PROCEDURE — 74011250636 HC RX REV CODE- 250/636: Performed by: INTERNAL MEDICINE

## 2018-02-09 PROCEDURE — 74011250637 HC RX REV CODE- 250/637: Performed by: INTERNAL MEDICINE

## 2018-02-09 PROCEDURE — 51702 INSERT TEMP BLADDER CATH: CPT

## 2018-02-09 PROCEDURE — 96372 THER/PROPH/DIAG INJ SC/IM: CPT

## 2018-02-09 PROCEDURE — 77030005518 HC CATH URETH FOL 2W BARD -B

## 2018-02-09 PROCEDURE — 65270000029 HC RM PRIVATE

## 2018-02-09 PROCEDURE — 80048 BASIC METABOLIC PNL TOTAL CA: CPT | Performed by: INTERNAL MEDICINE

## 2018-02-09 PROCEDURE — 0T9B70Z DRAINAGE OF BLADDER WITH DRAINAGE DEVICE, VIA NATURAL OR ARTIFICIAL OPENING: ICD-10-PCS

## 2018-02-09 PROCEDURE — 96375 TX/PRO/DX INJ NEW DRUG ADDON: CPT

## 2018-02-09 PROCEDURE — 77030027138 HC INCENT SPIROMETER -A

## 2018-02-09 PROCEDURE — 97161 PT EVAL LOW COMPLEX 20 MIN: CPT

## 2018-02-09 PROCEDURE — 83735 ASSAY OF MAGNESIUM: CPT | Performed by: INTERNAL MEDICINE

## 2018-02-09 RX ORDER — HYDROCODONE BITARTRATE AND ACETAMINOPHEN 5; 325 MG/1; MG/1
1 TABLET ORAL
Status: DISCONTINUED | OUTPATIENT
Start: 2018-02-09 | End: 2018-02-12 | Stop reason: HOSPADM

## 2018-02-09 RX ORDER — BISACODYL 5 MG
5 TABLET, DELAYED RELEASE (ENTERIC COATED) ORAL DAILY PRN
Status: DISCONTINUED | OUTPATIENT
Start: 2018-02-09 | End: 2018-02-12 | Stop reason: HOSPADM

## 2018-02-09 RX ORDER — POTASSIUM CHLORIDE 20MEQ/15ML
40 LIQUID (ML) ORAL ONCE
Status: COMPLETED | OUTPATIENT
Start: 2018-02-09 | End: 2018-02-09

## 2018-02-09 RX ORDER — FUROSEMIDE 10 MG/ML
40 INJECTION INTRAMUSCULAR; INTRAVENOUS
Status: COMPLETED | OUTPATIENT
Start: 2018-02-09 | End: 2018-02-09

## 2018-02-09 RX ORDER — MAG HYDROX/ALUMINUM HYD/SIMETH 200-200-20
30 SUSPENSION, ORAL (FINAL DOSE FORM) ORAL
Status: DISCONTINUED | OUTPATIENT
Start: 2018-02-09 | End: 2018-02-12 | Stop reason: HOSPADM

## 2018-02-09 RX ORDER — HYDRALAZINE HYDROCHLORIDE 20 MG/ML
20 INJECTION INTRAMUSCULAR; INTRAVENOUS
Status: DISCONTINUED | OUTPATIENT
Start: 2018-02-09 | End: 2018-02-12 | Stop reason: HOSPADM

## 2018-02-09 RX ORDER — HEPARIN SODIUM 5000 [USP'U]/ML
5000 INJECTION, SOLUTION INTRAVENOUS; SUBCUTANEOUS EVERY 8 HOURS
Status: DISCONTINUED | OUTPATIENT
Start: 2018-02-09 | End: 2018-02-12 | Stop reason: HOSPADM

## 2018-02-09 RX ORDER — MIRTAZAPINE 15 MG/1
15 TABLET, FILM COATED ORAL
Status: DISCONTINUED | OUTPATIENT
Start: 2018-02-09 | End: 2018-02-12 | Stop reason: HOSPADM

## 2018-02-09 RX ORDER — AMOXICILLIN 250 MG
2 CAPSULE ORAL
Status: DISCONTINUED | OUTPATIENT
Start: 2018-02-09 | End: 2018-02-12 | Stop reason: HOSPADM

## 2018-02-09 RX ORDER — MAGNESIUM SULFATE HEPTAHYDRATE 40 MG/ML
2 INJECTION, SOLUTION INTRAVENOUS ONCE
Status: COMPLETED | OUTPATIENT
Start: 2018-02-09 | End: 2018-02-09

## 2018-02-09 RX ORDER — HALOPERIDOL 5 MG/ML
4 INJECTION INTRAMUSCULAR
Status: DISCONTINUED | OUTPATIENT
Start: 2018-02-09 | End: 2018-02-12 | Stop reason: HOSPADM

## 2018-02-09 RX ORDER — METOPROLOL SUCCINATE 100 MG/1
100 TABLET, EXTENDED RELEASE ORAL DAILY
Status: DISCONTINUED | OUTPATIENT
Start: 2018-02-09 | End: 2018-02-12 | Stop reason: HOSPADM

## 2018-02-09 RX ORDER — SODIUM CHLORIDE 0.9 % (FLUSH) 0.9 %
5-10 SYRINGE (ML) INJECTION EVERY 8 HOURS
Status: DISCONTINUED | OUTPATIENT
Start: 2018-02-09 | End: 2018-02-09

## 2018-02-09 RX ORDER — AMLODIPINE BESYLATE 5 MG/1
5 TABLET ORAL DAILY
Status: DISCONTINUED | OUTPATIENT
Start: 2018-02-09 | End: 2018-02-12 | Stop reason: HOSPADM

## 2018-02-09 RX ORDER — BENAZEPRIL HYDROCHLORIDE 10 MG/1
5 TABLET ORAL DAILY
Status: DISCONTINUED | OUTPATIENT
Start: 2018-02-09 | End: 2018-02-12 | Stop reason: HOSPADM

## 2018-02-09 RX ORDER — SIMETHICONE 80 MG
80 TABLET,CHEWABLE ORAL
Status: DISCONTINUED | OUTPATIENT
Start: 2018-02-09 | End: 2018-02-12 | Stop reason: HOSPADM

## 2018-02-09 RX ORDER — ZOLPIDEM TARTRATE 5 MG/1
5 TABLET ORAL
Status: DISCONTINUED | OUTPATIENT
Start: 2018-02-09 | End: 2018-02-12 | Stop reason: HOSPADM

## 2018-02-09 RX ORDER — NALOXONE HYDROCHLORIDE 0.4 MG/ML
0.4 INJECTION, SOLUTION INTRAMUSCULAR; INTRAVENOUS; SUBCUTANEOUS AS NEEDED
Status: DISCONTINUED | OUTPATIENT
Start: 2018-02-09 | End: 2018-02-12 | Stop reason: HOSPADM

## 2018-02-09 RX ORDER — ASPIRIN 325 MG
325 TABLET, DELAYED RELEASE (ENTERIC COATED) ORAL DAILY
Status: DISCONTINUED | OUTPATIENT
Start: 2018-02-09 | End: 2018-02-12 | Stop reason: HOSPADM

## 2018-02-09 RX ORDER — SODIUM CHLORIDE 0.9 % (FLUSH) 0.9 %
5-10 SYRINGE (ML) INJECTION AS NEEDED
Status: DISCONTINUED | OUTPATIENT
Start: 2018-02-09 | End: 2018-02-09

## 2018-02-09 RX ORDER — GUAIFENESIN/DEXTROMETHORPHAN 100-10MG/5
10 SYRUP ORAL
Status: DISCONTINUED | OUTPATIENT
Start: 2018-02-09 | End: 2018-02-12 | Stop reason: HOSPADM

## 2018-02-09 RX ORDER — POLYETHYLENE GLYCOL 3350 17 G/17G
17 POWDER, FOR SOLUTION ORAL DAILY
Status: DISCONTINUED | OUTPATIENT
Start: 2018-02-09 | End: 2018-02-12 | Stop reason: HOSPADM

## 2018-02-09 RX ORDER — LORAZEPAM 1 MG/1
1 TABLET ORAL
Status: DISCONTINUED | OUTPATIENT
Start: 2018-02-09 | End: 2018-02-12 | Stop reason: HOSPADM

## 2018-02-09 RX ORDER — FUROSEMIDE 10 MG/ML
20 INJECTION INTRAMUSCULAR; INTRAVENOUS EVERY 12 HOURS
Status: DISCONTINUED | OUTPATIENT
Start: 2018-02-09 | End: 2018-02-11

## 2018-02-09 RX ORDER — ONDANSETRON 2 MG/ML
4 INJECTION INTRAMUSCULAR; INTRAVENOUS
Status: DISCONTINUED | OUTPATIENT
Start: 2018-02-09 | End: 2018-02-12 | Stop reason: HOSPADM

## 2018-02-09 RX ORDER — PANTOPRAZOLE SODIUM 40 MG/1
40 TABLET, DELAYED RELEASE ORAL
Status: DISCONTINUED | OUTPATIENT
Start: 2018-02-09 | End: 2018-02-12 | Stop reason: HOSPADM

## 2018-02-09 RX ORDER — ACETAMINOPHEN 325 MG/1
650 TABLET ORAL
Status: DISCONTINUED | OUTPATIENT
Start: 2018-02-09 | End: 2018-02-12 | Stop reason: HOSPADM

## 2018-02-09 RX ORDER — DIPHENHYDRAMINE HYDROCHLORIDE 50 MG/ML
25 INJECTION, SOLUTION INTRAMUSCULAR; INTRAVENOUS
Status: DISCONTINUED | OUTPATIENT
Start: 2018-02-09 | End: 2018-02-12 | Stop reason: HOSPADM

## 2018-02-09 RX ADMIN — HEPARIN SODIUM 5000 UNITS: 5000 INJECTION, SOLUTION INTRAVENOUS; SUBCUTANEOUS at 17:34

## 2018-02-09 RX ADMIN — Medication 5 ML: at 20:47

## 2018-02-09 RX ADMIN — AMLODIPINE BESYLATE 5 MG: 5 TABLET ORAL at 17:34

## 2018-02-09 RX ADMIN — HALOPERIDOL LACTATE 4 MG: 5 INJECTION, SOLUTION INTRAMUSCULAR at 07:35

## 2018-02-09 RX ADMIN — FUROSEMIDE 20 MG: 10 INJECTION, SOLUTION INTRAMUSCULAR; INTRAVENOUS at 20:44

## 2018-02-09 RX ADMIN — METOPROLOL SUCCINATE 100 MG: 100 TABLET, EXTENDED RELEASE ORAL at 17:34

## 2018-02-09 RX ADMIN — HEPARIN SODIUM 5000 UNITS: 5000 INJECTION, SOLUTION INTRAVENOUS; SUBCUTANEOUS at 02:23

## 2018-02-09 RX ADMIN — FUROSEMIDE 40 MG: 10 INJECTION, SOLUTION INTRAMUSCULAR; INTRAVENOUS at 01:06

## 2018-02-09 RX ADMIN — PANTOPRAZOLE SODIUM 40 MG: 40 TABLET, DELAYED RELEASE ORAL at 07:33

## 2018-02-09 RX ADMIN — MAGNESIUM SULFATE IN WATER 2 G: 40 INJECTION, SOLUTION INTRAVENOUS at 20:45

## 2018-02-09 RX ADMIN — POTASSIUM CHLORIDE 40 MEQ: 20 SOLUTION ORAL at 02:22

## 2018-02-09 NOTE — ED NOTES
I have reviewed discharge instructions with the patient. The patient verbalized understanding. Patient left ED via Discharge Method: wheelchair to Home with self    Opportunity for questions and clarification provided. Patient given 3 scripts. To continue your aftercare when you leave the hospital, you may receive an automated call from our care team to check in on how you are doing. This is a free service and part of our promise to provide the best care and service to meet your aftercare needs.  If you have questions, or wish to unsubscribe from this service please call [phone]. Thank you for Choosing our [facility].

## 2018-02-09 NOTE — PROGRESS NOTES
Hospitalist Progress Note    2018  Admit Date: 2018 10:56 PM   NAME: Christos Weir   :  1927   MRN:  [id number]   Attending: Kaitlynn Mane MD  PCP:  Guera Christian MD    SUBJECTIVE:   Burnett Gaucher is a 81 yo F admitted after midnight on 18 for acute pulmonary edema and suspected CHF. Given one dose of IV lasix, but not re-dosed due to hyponatremia, which may be related to acute CHF. She reports she is breathing some better, but desaturates to low 80s immediately with sitting up. Echo performed showing LVDD and mild aortic stenosis.       Review of Systems negative with exception of pertinent positives noted above  PHYSICAL EXAM     Visit Vitals    /79    Pulse 81    Temp 98.3 °F (36.8 °C)    Resp 20    Ht 5' 1\" (1.549 m)    Wt 63.5 kg (140 lb)    SpO2 92%    BMI 26.45 kg/m2      Temp (24hrs), Av.3 °F (36.8 °C), Min:98.3 °F (36.8 °C), Max:98.3 °F (36.8 °C)    Patient Vitals for the past 24 hrs:   Temp Pulse Resp BP SpO2   18 1613 - 62 - 130/63 91 %   18 0740 - 81 - 178/79 92 %   18 0700 - (!) 59 20 128/61 99 %   18 0640 - (!) 58 19 119/58 -   18 0620 - (!) 54 20 112/56 94 %   18 0500 - (!) 39 19 124/60 92 %   18 0440 - (!) 55 20 127/58 98 %   18 0420 - (!) 56 21 133/67 99 %   18 0400 - (!) 58 20 116/56 100 %   18 0340 - (!) 58 19 105/61 96 %   18 0320 - (!) 56 18 130/60 99 %   18 0300 - (!) 58 18 132/63 98 %   18 0240 - 64 20 130/75 97 %   18 0220 - (!) 59 19 113/54 100 %   18 0200 - 62 17 128/60 100 %   18 0140 - 62 17 125/60 -   18 0120 - 66 19 159/72 99 %   18 0106 - (!) 58 - 112/58 -   18 0100 - (!) 54 18 112/56 100 %   18 0040 - (!) 56 18 110/56 99 %   18 0020 - (!) 58 20 114/59 99 %   18 0000 - 63 21 119/58 -   18 2322 - 72 15 169/79 96 %   18 2310 - 72 28 - 92 %   18 2259 98.3 °F (36.8 °C) 73 20 180/81 94 %       Oxygen Therapy  O2 Sat (%): 92 % (02/09/18 0740)  Pulse via Oximetry: 56 beats per minute (02/09/18 0700)  No intake or output data in the 24 hours ending 02/09/18 1612   General: No acute distress    Lungs:  Rales bilaterally with slight wheeze noted. Heart:  Regular rate and rhythm,  No murmur, rub, or gallop  Abdomen: Soft, Non distended, Non tender, Positive bowel sounds  Extremities: No cyanosis, clubbing or edema  Neurologic:  No focal deficits    Recent Results (from the past 24 hour(s))   EKG, 12 LEAD, INITIAL    Collection Time: 02/08/18 11:06 PM   Result Value Ref Range    Ventricular Rate 73 BPM    Atrial Rate 174 BPM    QRS Duration 96 ms    Q-T Interval 394 ms    QTC Calculation (Bezet) 434 ms    Calculated R Axis 50 degrees    Calculated T Axis 71 degrees    Diagnosis       !! AGE AND GENDER SPECIFIC ECG ANALYSIS !! Normal sinus rhythm  No significant change was found  When compared with ECG of 15-AUG-2017 11:45,  Confirmed by YVON LAI (), KEITH CASON ([id number]) on 2/9/2018 7:02:03 AM     BNP    Collection Time: 02/08/18 11:15 PM   Result Value Ref Range     pg/mL   CBC WITH AUTOMATED DIFF    Collection Time: 02/08/18 11:16 PM   Result Value Ref Range    WBC 5.5 4.3 - 11.1 K/uL    RBC 3.93 (L) 4.05 - 5.25 M/uL    HGB 12.1 11.7 - 15.4 g/dL    HCT 34.3 (L) 35.8 - 46.3 %    MCV 87.3 79.6 - 97.8 FL    MCH 30.8 26.1 - 32.9 PG    MCHC 35.3 (H) 31.4 - 35.0 g/dL    RDW 12.4 11.9 - 14.6 %    PLATELET 839 315 - 809 K/uL    MPV 8.8 (L) 10.8 - 14.1 FL    DF AUTOMATED      NEUTROPHILS 81 (H) 43 - 78 %    LYMPHOCYTES 5 (L) 13 - 44 %    MONOCYTES 13 (H) 4.0 - 12.0 %    EOSINOPHILS 1 0.5 - 7.8 %    BASOPHILS 0 0.0 - 2.0 %    IMMATURE GRANULOCYTES 0 0.0 - 5.0 %    ABS. NEUTROPHILS 4.4 1.7 - 8.2 K/UL    ABS. LYMPHOCYTES 0.3 (L) 0.5 - 4.6 K/UL    ABS. MONOCYTES 0.7 0.1 - 1.3 K/UL    ABS. EOSINOPHILS 0.0 0.0 - 0.8 K/UL    ABS. BASOPHILS 0.0 0.0 - 0.2 K/UL    ABS. IMM.  GRANS. 0.0 0.0 - 0.5 K/UL   METABOLIC PANEL, COMPREHENSIVE    Collection Time: 18 11:16 PM   Result Value Ref Range    Sodium 126 (L) 136 - 145 mmol/L    Potassium 3.2 (L) 3.5 - 5.1 mmol/L    Chloride 88 (L) 98 - 107 mmol/L    CO2 30 21 - 32 mmol/L    Anion gap 8 7 - 16 mmol/L    Glucose 170 (H) 65 - 100 mg/dL    BUN 6 (L) 8 - 23 MG/DL    Creatinine 0.43 (L) 0.6 - 1.0 MG/DL    GFR est AA >60 >60 ml/min/1.73m2    GFR est non-AA >60 >60 ml/min/1.73m2    Calcium 8.7 8.3 - 10.4 MG/DL    Bilirubin, total 0.7 0.2 - 1.1 MG/DL    ALT (SGPT) 30 12 - 65 U/L    AST (SGOT) 32 15 - 37 U/L    Alk. phosphatase 143 (H) 50 - 136 U/L    Protein, total 6.6 6.3 - 8.2 g/dL    Albumin 3.5 3.2 - 4.6 g/dL    Globulin 3.1 2.3 - 3.5 g/dL    A-G Ratio 1.1 (L) 1.2 - 3.5     POC TROPONIN-I    Collection Time: 18 11:19 PM   Result Value Ref Range    Troponin-I (POC) 0.02 0.02 - 0.05 ng/ml   POC LACTIC ACID    Collection Time: 18 11:23 PM   Result Value Ref Range    Lactic Acid (POC) 0.9 0.5 - 1.9 mmol/L     Imaging:   XR CHEST PORT   Final Result   IMPRESSION: Acute interstitial pulmonary edema. Results for orders placed or performed during the hospital encounter of 18   2D ECHO COMPLETE ADULT (TTE) [address]  [address] [facility], [address] [facility]  [phone]    Transthoracic Echocardiogram  2D, M-mode, Doppler, and Color Doppler    Patient: Kimberli Robert  MR #: [id number]  : 04-Dec-1927  Age: 80 years  Gender: Female  Study date: 2018  Account #: [de-identified]  Height: 61 in  Weight: 139.7 lb  BSA: 1.62 mï¾²  Status:Routine  Location: [place] [address]  BP: 178/ 79    Allergies: SULFA (SULFONAMIDE ANTIBIOTICS)    Sonographer:  Mookie Hong RDCS  Group:  [facility]  Referring Physician:  Masha Thompson. Stephanie Ballard MD  Reading Physician:  Millie Lubin. Yariel Mckeon MD [facility]    INDICATIONS: Check CHF    *Patient would not allow for additional pedoff images. *    PROCEDURE: This was a routine study. A transthoracic echocardiogram was  performed. The study included complete 2D imaging, M-mode, complete spectral  Doppler, and color Doppler. Image quality was adequate. LEFT VENTRICLE: Size was normal. Systolic function was normal. Ejection  fraction was estimated in the range of 60 % to 65 %. There were no regional  wall motion abnormalities. Wall thickness was mildly increased. Doppler  parameters were consistent with mild diastolic dysfunction (grade 1). RIGHT VENTRICLE: The size was normal. Systolic function was normal. The  tricuspid jet envelope definition was inadequate for estimation of RV   systolic  pressure. LEFT ATRIUM: The atrium was markedly dilated. RIGHT ATRIUM: The atrium was mildly to moderately dilated. SYSTEMIC VEINS: IVC: The inferior vena cava was normal in size and course. AORTIC VALVE: The valve was probably trileaflet. Leaflets exhibited moderate  calcification. The aortic valve area by the continuity equation was 1.8 cm2. The peak velocity was 2.45 m/s. The mean pressure gradient was 13 mmHg. The  findings were most consistent with mild aortic stenosis. The peak pressure  gradient was 24 mmHg. There was mild regurgitation. SVi- 54, Di- 0.58. MITRAL VALVE: Valve structure was normal. There was no evidence for stenosis. There was mild regurgitation. TRICUSPID VALVE: The valve structure was normal. There was no evidence for  stenosis. There was mild regurgitation. PULMONIC VALVE: Not well visualized. There was no evidence for stenosis. There  was trivial regurgitation. PERICARDIUM: A trivial pericardial effusion was identified. AORTA: The root exhibited normal size. SUMMARY:    -  Left ventricle: Systolic function was normal. Ejection fraction was  estimated in the range of 60 % to 65 %. There were no regional wall motion  abnormalities. Wall thickness was mildly increased.  Doppler parameters were  consistent with mild diastolic dysfunction (grade 1). -  Left atrium: The atrium was markedly dilated. -  Right atrium: The atrium was mildly to moderately dilated. -  Aortic valve: The valve was probably trileaflet. Leaflets exhibited   moderate  calcification. The aortic valve area by the continuity equation was 1.8 cm2. The findings were most consistent with mild aortic stenosis. -  Mitral valve: There was mild regurgitation.    -  Tricuspid valve: There was mild regurgitation.    -  Pericardium: A trivial pericardial effusion was identified. SYSTEM MEASUREMENT TABLES    2D mode  AoR Diam (2D): 2.9 cm  LA Dimension (2D): 2.3 cm  Left Atrium Systolic Volume Index; Method of Disks, Biplane; 2D mode;: 49.4  ml/m2  IVS/LVPW (2D): 0.9  IVSd (2D): 1.2 cm  LVIDd (2D): 3.3 cm  LVIDs (2D): 2.5 cm  LVOT Area (2D): 3.1 cm2  LVPWd (2D): 1.3 cm    Tissue Doppler Imaging  LV Peak Early Bates Tissue Vinayak; Mean; Lateral MA (TDI): 7.1 cm/s  LV Peak Early Bates Tissue Vinayak; Medial MA (TDI): 6 cm/s    Unspecified Scan Mode  Peak Grad; Mean; Antegrade Flow: 17 mm[Hg]  Vmax; Antegrade Flow: 178 cm/s  LVOT Diam: 2 cm  MV Peak Vinayak/LV Peak Tissue Vinayak E-Wave; Lateral MA: 14.5  MV Peak Vinayak/LV Peak Tissue Vinayak E-Wave; Medial MA: 17.1    Prepared and signed by    Amee Clement MD [facility]  Signed 09-Feb-2018 15:35:04         ASSESSMENT      Hospital Problems as of 2/9/2018  Date Reviewed: 9/13/2016          Codes Class Noted - Resolved POA    Hyponatremia ICD-10-CM: E87.1  ICD-9-CM: 276.1  2/9/2018 - Present Yes        * (Principal)Acute pulmonary edema ([address] ICD-10-CM: J81.0  ICD-9-CM: 518.4  2/9/2018 - Present Yes        Acute respiratory failure with hypoxia (HCC) ICD-10-CM: J96.01  ICD-9-CM: 518.81  2/9/2018 - Present Yes        DNR (do not resuscitate) (Chronic) ICD-10-CM: Z66  ICD-9-CM: V49.86  9/11/2016 - Present Yes        PAF (paroxysmal atrial fibrillation) (HCC) (Chronic) ICD-10-CM: I48.0  ICD-9-CM: 427.31  9/10/2016 - Present Yes        HTN (hypertension) (Chronic) ICD-10-CM: I10  ICD-9-CM: 401.9  8/18/2016 - Present Yes            Plan:  · Start IV lasix 20 mg IV q 12 hours. · Appears she has diuresed after 1st lasix dose as ~1400 ml of urine in spicer bag. · Monitor electrolytes. · Continue PT. DVT Prophylaxis:     Signed By: Mariela De.  Gagan Thompson MD     February 9, 2018

## 2018-02-09 NOTE — PROGRESS NOTES
Problem: Mobility Impaired (Adult and Pediatric)  Goal: *Acute Goals and Plan of Care (Insert Text)  LTGs:  (1.)Ms. Bonnie Avalos will move from supine to sit and sit to supine  with SUPERVISION within 5 day(s). (2.)Ms. Bonnie Avalos will transfer from bed to chair and chair to bed with SUPERVISION using the least restrictive device within 5 day(s). (3.)Ms. Bonnie Avalos will ambulate with SUPERVISION for 200 feet with the least restrictive device and stable O2 sat >90% within 5 day(s). 94.)Pt. Will increase B LE strength 1/2 grade to improve functional mobility within 5 days      ________________________________________________________________________________________________    PHYSICAL THERAPY: Initial Assessment, PM 2/9/2018  EMERGENCY: Hospital Day: 2  [place] / Product Type: Medicare /      NAME/AGE/GENDER: Dakota Landry is a 80 y.o. female   PRIMARY DIAGNOSIS: Acute pulmonary edema (HCC) Acute pulmonary edema (HCC) Acute pulmonary edema (HCC)        ICD-10: Treatment Diagnosis:   · Generalized Muscle Weakness (M62.81)  · Difficulty in walking, Not elsewhere classified (R26.2)  · History of falling (Z91.81)   Precaution/Allergies:  Sulfa (sulfonamide antibiotics)      ASSESSMENT:     Ms. Bonnie Avalos presents with acute pulmonary edema and demonstrates decreased general strength and endurance, standing balance and functional mobility. She would benefit from further PT while here and I expect that will return to a functional level acceptable to her long term. Audible wheezes present while working with her    This section established at most recent assessment   PROBLEM LIST (Impairments causing functional limitations):  1. Decreased Strength  2. Decreased Transfer Abilities  3. Decreased Ambulation Ability/Technique  4. Decreased Balance  5. Decreased Activity Tolerance  6. Increased Fatigue  7. Increased Shortness of Breath  8. Decreased Flexibility/Joint Mobility  9.  Decreased [place] with Home Exercise Program   INTERVENTIONS PLANNED: (Benefits and precautions of physical therapy have been discussed with the patient.)  1. Balance Exercise  2. Bed Mobility  3. Gait Training  4. Range of Motion (ROM)  5. Therapeutic Activites  6. Therapeutic Exercise/Strengthening  7. Transfer Training  8. endurance activities     TREATMENT PLAN: Frequency/Duration: daily for duration of hospital stay  Rehabilitation Potential For Stated Goals: Good     RECOMMENDED REHABILITATION/EQUIPMENT: (at time of discharge pending progress): Due to the probability of continued deficits (see above) this patient may need continued skilled physical therapy after discharge. Equipment:    her friend states she uses a RW and str cane              HISTORY:   History of Present Injury/Illness (Reason for Referral): Admitted with acute pulmonary edema  Past Medical History/Comorbidities:   Ms. Hector Porras  has a past medical history of Atrial fibrillation ([facility] [address] CAD (coronary artery disease); Chest pain (9/13/2016); Gastrointestinal disorder (irritable bowel); and Hypertension. Ms. Hector Porras  has a past surgical history that includes hx cholecystectomy; hx other surgical; and hx orthopaedic. Social History/Living Environment:   [facility] [address] Name: [facility] [address]  # Steps to Enter: 0  One/Two Story Residence: One story  Living Alone: No  Support Systems: Assisted living, Friends \ neighbors  Patient Expects to be Discharged to[de-identified] Assisted living  Current DME Used/Available at Home: Margo Hernandez, straight, Walker, rolling  Tub or Shower Type: Shower  Prior Level of Function/Work/Activity:  Per her friend, she is able to perform her ADLs on her own.  She uses a RW or str cane with amb     Number of Personal Factors/Comorbidities that affect the Plan of Care: 1-2: MODERATE COMPLEXITY   EXAMINATION:   Most Recent Physical Functioning:   Gross Assessment:  AROM: Generally decreased, functional (B LES)  Strength: Generally decreased, functional (grossly 3+/5 B LEs)  Sensation: Intact (B LEs)               Posture:  Posture Assessment: Forward head, Rounded shoulders  Balance:  Sitting: Intact  Standing: Pull to stand; With support Bed Mobility:  Supine to Sit: Minimum assistance  Sit to Supine: Minimum assistance  Wheelchair Mobility:     Transfers:  Sit to Stand: Minimum assistance  Stand to Sit: Minimum assistance  Gait:     Speed/Lana: Pace decreased (<100 feet/min)  Gait Abnormalities: Decreased step clearance  Distance (ft): 5 Feet (ft)  Assistive Device:  (HHA)  Ambulation - Level of Assistance: Minimal assistance  Interventions: Safety awareness training;Verbal cues      Body Structures Involved:  1. Lungs  2. Joints  3. Muscles Body Functions Affected:  1. Respiratory  2. Neuromusculoskeletal  3. Movement Related Activities and Participation Affected:  1. Mobility  2. Self Care   Number of elements that affect the Plan of Care: 4+: HIGH COMPLEXITY   CLINICAL PRESENTATION:   Presentation: Evolving clinical presentation with changing clinical characteristics: MODERATE COMPLEXITY   CLINICAL DECISION MAKIN [facility] Mobility Inpatient Short Form  How much difficulty does the patient currently have. .. Unable A Lot A Little None   1. Turning over in bed (including adjusting bedclothes, sheets and blankets)? [] 1   [] 2   [x] 3   [] 4   2. Sitting down on and standing up from a chair with arms ( e.g., wheelchair, bedside commode, etc.)   [] 1   [] 2   [x] 3   [] 4   3. Moving from lying on back to sitting on the side of the bed? [] 1   [] 2   [x] 3   [] 4   How much help from another person does the patient currently need. .. Total A Lot A Little None   4. Moving to and from a bed to a chair (including a wheelchair)? [] 1   [] 2   [x] 3   [] 4   5. Need to walk in hospital room? [] 1   [] 2   [x] 3   [] 4   6. Climbing 3-5 steps with a railing?    [] 1   [x] 2   [] 3   [] 4   © 2007, Trustees of [address] [place] [address] [address] [address], under license to [employer]. All rights reserved      Score:  Initial: 17 Most Recent: X (Date: -- )    Interpretation of Tool:  Represents activities that are increasingly more difficult (i.e. Bed mobility, Transfers, Gait). Score 24 23 22-20 19-15 14-10 9-7 6     Modifier CH CI CJ CK CL CM CN      ? Mobility - Walking and Moving Around:     - CURRENT STATUS: CK - 40%-59% impaired, limited or restricted    - GOAL STATUS: CJ - 20%-39% impaired, limited or restricted    - D/C STATUS:  ---------------To be determined---------------  [place] / Product Type: Medicare /      Medical Necessity:     · Patient demonstrates good rehab potential due to higher previous functional level. Reason for Services/Other Comments:  · Patient continues to require present interventions due to patient's inability to perform functional mobility independently. Use of outcome tool(s) and clinical judgement create a POC that gives a: Clear prediction of patient's progress: LOW COMPLEXITY            TREATMENT:   (In addition to Assessment/Re-Assessment sessions the following treatments were rendered)   Pre-treatment Symptoms/Complaints:  wheezing  Pain: Initial:   Pain Intensity 1: 0  Post Session:  0     Assessment/Reassessment only, no treatment provided today    Braces/Orthotics/Lines/Etc:   · spicer catheter  · O2 via nasal cannula, telemetry lines  ·    Treatment/Session Assessment:    · Response to Treatment:  Tolerated with SOB  · Interdisciplinary Collaboration:   o Physical Therapist  o Occupational Therapist  o Registered Nurse  · After treatment position/precautions:   o Supine in bed  o Bed/Chair-wheels locked  o Bed in low position  o Call light within reach  o RN notified  o Family at bedside  o Side rails x 2   · Compliance with Program/Exercises: Will assess as treatment progresses.   · Recommendations/Intent for next treatment session: \"Next visit will focus on advancements to more challenging activities and reduction in assistance provided\".   Total Treatment Duration:  PT Patient Time In/Time Out  Time In: 7710  Time Out: [place], [place]

## 2018-02-09 NOTE — PROGRESS NOTES
PT and OT    Therapists attempt to initiate evals in ER. Unable to arouse pt. Spoke with RN and he stated that she received Haldol due to restlessness. Will attempt to check back later today if therapy schedule allows.   Otherwise will make attempt in the am.    Kim Dies, PT  Stefani Mason, OT

## 2018-02-09 NOTE — PROGRESS NOTES
Problem: Self Care Deficits Care Plan (Adult)  Goal: *Acute Goals and Plan of Care (Insert Text)  1. Patient will perform grooming with supervision. 2. Patient will perform Upper body dressing with supervision  3. Patient will perform lower body dressing with supervision  4. Patient will perform upper and lower body bathing with supervision. 5. Patient will perform toilet transfers with CGA. 6. Patient will perform shower transfer with CGA. 7. Patient will participate in 30 + minutes of ADL/ therapeutic exercise/therapeutic activity with min rest breaks to increase activity tolerance for self care. 8. Patient will perform ADL functional mobility in room with CGA. Goals to be achieved in 7 days. OCCUPATIONAL THERAPY: Initial Assessment and PM 2/9/2018  EMERGENCY: Hospital Day: 2  [place] / Product Type: Medicare /      NAME/AGE/GENDER: Hailey Fitch is a 80 y.o. female   PRIMARY DIAGNOSIS:  Acute pulmonary edema (HCC) Acute pulmonary edema (HCC) Acute pulmonary edema (HCC)        ICD-10: Treatment Diagnosis:    · Generalized Muscle Weakness (M62.81)  · Other lack of cordination (R27.8)   Precautions/Allergies:     Sulfa (sulfonamide antibiotics)      ASSESSMENT:     Ms. Katie Chambers presents supine on stretcher, staff feeding lunch. She looks like she does not feel well, she is cooperative but too tired to answer questions. She is grossly min assist with transfers and mod assist with ADLs due to fatigue. She lives [facility], and may use a walker for mobility. She would benefit from skilled OT services. Will follow. This section established at most recent assessment   PROBLEM LIST (Impairments causing functional limitations):  1. Decreased Strength  2. Decreased ADL/Functional Activities  3. Decreased Transfer Abilities  4. Decreased Ambulation Ability/Technique  5. Decreased Balance  6.  Decreased Activity Tolerance   INTERVENTIONS PLANNED: (Benefits and precautions of occupational therapy have been discussed with the patient.)  1. Activities of daily living training  2. Adaptive equipment training  3. Balance training  4. Clothing management  5. Donning&doffing training  6. Neuromuscular re-eduation  7. Therapeutic activity  8. Therapeutic exercise  9. Home safety and DME recommendations     TREATMENT PLAN: Frequency/Duration: Follow patient 4 times to address above goals. Rehabilitation Potential For Stated Goals: Good     RECOMMENDED REHABILITATION/EQUIPMENT: (at time of discharge pending progress): Due to the probability of continued deficits (see above) this patient will likely need continued skilled occupational therapy after discharge. Equipment:    None at this time              OCCUPATIONAL PROFILE AND HISTORY:   History of Present Injury/Illness (Reason for Referral):  Decreased self care and functional mobility   Past Medical History/Comorbidities:   Ms. José Ordaz  has a past medical history of Atrial fibrillation ([facility] [address] CAD (coronary artery disease); Chest pain (9/13/2016); Gastrointestinal disorder (irritable bowel); and Hypertension. Ms. José Ordaz  has a past surgical history that includes hx cholecystectomy; hx other surgical; and hx orthopaedic.   Social History/Living Environment:   Home Environment: [address] [place] [address] Name: Jori  # Steps to Enter: 0  One/Two Story Residence: One story  Living Alone: No  Support Systems: Assisted living, Friends \ neighbors  Patient Expects to be Discharged to[de-identified] Assisted living  Current DME Used/Available at Home: [address] [place], straight, Walker, rolling  Tub or Shower Type: Shower  Prior Level of Function/Work/Activity:  Unknown, but most likely mod I at her [facility]     Number of Personal Factors/Comorbidities that affect the Plan of Care: Brief history (0):  LOW COMPLEXITY   ASSESSMENT OF OCCUPATIONAL PERFORMANCE[de-identified]   Activities of Daily Living:           Basic ADLs (From Assessment) Complex ADLs (From Assessment) Basic ADL  Feeding:  (staff feeding her lunch)  Oral Facial Hygiene/Grooming: Minimum assistance  Bathing: Moderate assistance  Upper Body Dressing: Minimum assistance  Lower Body Dressing: Moderate assistance  Toileting: Total assistance (catheter)     Grooming/Bathing/Dressing Activities of Daily Living     Cognitive Retraining  Safety/Judgement: Fall prevention                       Bed/Mat Mobility  Sit to Stand: Minimum assistance       Most Recent Physical Functioning:   Gross Assessment:                  Posture:     Balance:  Sitting: Intact  Standing: Pull to stand; With support Bed Mobility:     Wheelchair Mobility:     Transfers:  Sit to Stand: Minimum assistance  Stand to Sit: Minimum assistance     B UE ROM WFL           No data found. Mental Status  Neurologic State: Alert  Orientation Level: Oriented to person  Cognition: Follows commands  Perception: Appears intact  Perseveration: No perseveration noted  Safety/Judgement: Fall prevention                          Physical Skills Involved:  1. Balance  2. Strength  3. Activity Tolerance Cognitive Skills Affected (resulting in the inability to perform in a timely and safe manner):  1. Perception  2. Comprehension Psychosocial Skills Affected:  1. Self-Awareness   Number of elements that affect the Plan of Care: 5+:  HIGH COMPLEXITY   CLINICAL DECISION MAKIN [place] [id number] AM-PAC 6 Clicks   Daily Activity Inpatient Short Form  How much help from another person does the patient currently need. .. Total A Lot A Little None   1. Putting on and taking off regular lower body clothing? [] 1   [x] 2   [] 3   [] 4   2. Bathing (including washing, rinsing, drying)? [] 1   [x] 2   [] 3   [] 4   3. Toileting, which includes using toilet, bedpan or urinal?   [x] 1   [] 2   [] 3   [] 4   4. Putting on and taking off regular upper body clothing? [] 1   [] 2   [x] 3   [] 4   5. Taking care of personal grooming such as brushing teeth?    [] 1 [] 2   [x] 3   [] 4   6. Eating meals? [] 1   [] 2   [x] 3   [] 4   © 2007, Trustees of [address] [place] [address] [address] [address], under license to [employer]. All rights reserved      Score:  Initial:14 Most Recent: X (Date: -- )    Interpretation of Tool:  Represents activities that are increasingly more difficult (i.e. Bed mobility, Transfers, Gait). Score 24 23 22-20 19-15 14-10 9-7 6     Modifier CH CI CJ CK CL CM CN      ? Self Care:     - CURRENT STATUS: CL - 60%-79% impaired, limited or restricted    - GOAL STATUS: CJ - 20%-39% impaired, limited or restricted    - D/C STATUS:  ---------------To be determined---------------  [place] / Product Type: Medicare /      Medical Necessity:     · Patient is expected to demonstrate progress in balance, coordination and functional technique to decrease assistance required with self care and functional mobility  and improve safety during self care and functional mobility . Reason for Services/Other Comments:  · Patient continues to require skilled intervention due to decrased self care and functional mobility .    Use of outcome tool(s) and clinical judgement create a POC that gives a: LOW COMPLEXITY         TREATMENT:   (In addition to Assessment/Re-Assessment sessions the following treatments were rendered)     Pre-treatment Symptoms/Complaints:    Pain: Initial:   Pain Intensity 1: 0  Post Session:  0/10     Assessment/Reassessment only, no treatment provided today    Braces/Orthotics/Lines/Etc:   · telemetry, o2 sat monitor, O2 NC  ·  CATHETER  Treatment/Session Assessment:    · Response to Treatement, weak but cooperative  · Interdisciplinary Collaboration:   o Physical Therapist  o Occupational Therapist  o Registered Nurse  · After treatment position/precautions:   o Supine in bed  o Bed/Chair-wheels locked  o Bed in low position  o Call light within reach  o RN notified  o Visitors at bedside  o Side rails x 2 · Compliance with Program/Exercises: compliant most of the time. · Recommendations/Intent for next treatment session: \"Next visit will focus on advancements to more challenging activities and reduction in assistance provided\".   Total Treatment Duration:  OT Patient Time In/Time Out  Time In: 1355  Time Out: [facility],

## 2018-02-09 NOTE — H&P
Hospitalist H&P Note     Admit Date:  2018 10:56 PM   Name:  Sumeet Marcano   Age:  80 y.o.  :  1927   MRN:  [id number]   PCP:  Ekaterina Pierre MD  Treatment Team: Attending Provider: Home Blackman MD; Primary Nurse: Kari Madison RN    HPI:   Patient is a 81 y/o F who presented with progressively worsening SOB over the last 1-2 days. She says she has had this problem before. A/w increased leg swelling. Denies history of CHF; \"my heart doctor said it's not my heart\". She said she fell 10 days ago and bruised her R lower ribs and this has been affecting her breathing. She was mildly hypoxic on room air at rest 90%. Denies orthopnea and actually prefers to lay flat. Denies recent illnesses, fevers, chills, palpitations, abd pain, urinary symptoms, diarrhea, or other symptoms NOS. CXR with pulm edema. 10 systems reviewed and negative except as noted in HPI. Past Medical History:   Diagnosis Date    Atrial fibrillation ([address]     CAD (coronary artery disease)     afib    Chest pain 2016    Gastrointestinal disorder irritable bowel    Hypertension       Past Surgical History:   Procedure Laterality Date    HX CHOLECYSTECTOMY      Colostomy reversal    HX ORTHOPAEDIC      left hip replacement, rt pelvic fx    HX OTHER SURGICAL      Extensive abdominal surgical history      Allergies   Allergen Reactions    Sulfa (Sulfonamide Antibiotics) Not Reported This Time     Pt denies allergy      Social History   Substance Use Topics    Smoking status: Never Smoker    Smokeless tobacco: Never Used    Alcohol use No      No family history on file. Immunization History   Administered Date(s) Administered    TB Skin Test (PPD) Intradermal 2016     PTA Medications:  Prior to Admission Medications   Prescriptions Last Dose Informant Patient Reported? Taking? VIT C/YASMINE AC/LUT/COPPER/ZNOX (PRESERVISION LUTEIN PO)   Yes No   Sig: Take  by mouth.    amLODIPine (NORVASC) 5 mg tablet   Yes No   Sig: Take 5 mg by mouth daily. aspirin delayed-release 81 mg tablet   Yes No   Sig: Take 325 mg by mouth daily. benazepril (LOTENSIN) 20 mg tablet   Yes No   Sig: Take 5 mg by mouth daily. esomeprazole (NEXIUM) 40 mg capsule   Yes No   Sig: Take  by mouth daily. linaclotide (LINZESS) 145 mcg cap capsule   Yes No   Sig: Take 145 mcg by mouth Daily (before breakfast). metoprolol succinate (TOPROL-XL) 50 mg XL tablet   No No   Sig: Take 1 Tab by mouth daily. Indications: HYPERTENSION   Patient taking differently: Take 100 mg by mouth daily. Indications: Hypertension   polyethylene glycol (MIRALAX) 17 gram/dose powder   No No   Sig: Take 17 g by mouth daily. 1 tablespoon with 8 oz of water daily      Facility-Administered Medications: None       Objective:   Patient Vitals for the past 24 hrs:   Temp Pulse Resp BP SpO2   02/09/18 0106 - (!) 58 - 112/58 -   02/09/18 0100 - (!) 54 18 112/56 100 %   02/09/18 0040 - (!) 56 18 110/56 99 %   02/09/18 0020 - (!) 58 20 114/59 99 %   02/09/18 0000 - 63 21 119/58 -   02/08/18 2322 - 72 15 169/79 96 %   02/08/18 2310 - 72 28 - 92 %   02/08/18 2259 98.3 °F (36.8 °C) 73 20 180/81 94 %     Oxygen Therapy  O2 Sat (%): 100 % (02/09/18 0100)  Pulse via Oximetry: 54 beats per minute (02/09/18 0100)  No intake or output data in the 24 hours ending 02/09/18 0132    Physical Exam:  General:    Well nourished. Alert. Eyes:   Normal sclera. Extraocular movements intact. ENT:  Normocephalic, atraumatic. Moist mucous membranes  CV:   RRR. No m/r/g. Peripheral pulses 2+. Capillary refill <2s. Lungs:  Mild crackles bilaterally  Abdomen: Soft, nontender, nondistended. Bowel sounds normal.   Extremities: Warm and dry. No cyanosis. 2+ edema BLE  Neurologic: CN II-XII grossly intact. Sensation intact. Skin:     No rashes or jaundice. Normal coloration  Psych:  Normal mood and affect.     I reviewed the labs, imaging, EKGs, telemetry, and other studies done this admission. Data Review:   Recent Results (from the past 24 hour(s))   BNP    Collection Time: 02/08/18 11:15 PM   Result Value Ref Range     pg/mL   CBC WITH AUTOMATED DIFF    Collection Time: 02/08/18 11:16 PM   Result Value Ref Range    WBC 5.5 4.3 - 11.1 K/uL    RBC 3.93 (L) 4.05 - 5.25 M/uL    HGB 12.1 11.7 - 15.4 g/dL    HCT 34.3 (L) 35.8 - 46.3 %    MCV 87.3 79.6 - 97.8 FL    MCH 30.8 26.1 - 32.9 PG    MCHC 35.3 (H) 31.4 - 35.0 g/dL    RDW 12.4 11.9 - 14.6 %    PLATELET 144 010 - 527 K/uL    MPV 8.8 (L) 10.8 - 14.1 FL    DF AUTOMATED      NEUTROPHILS 81 (H) 43 - 78 %    LYMPHOCYTES 5 (L) 13 - 44 %    MONOCYTES 13 (H) 4.0 - 12.0 %    EOSINOPHILS 1 0.5 - 7.8 %    BASOPHILS 0 0.0 - 2.0 %    IMMATURE GRANULOCYTES 0 0.0 - 5.0 %    ABS. NEUTROPHILS 4.4 1.7 - 8.2 K/UL    ABS. LYMPHOCYTES 0.3 (L) 0.5 - 4.6 K/UL    ABS. MONOCYTES 0.7 0.1 - 1.3 K/UL    ABS. EOSINOPHILS 0.0 0.0 - 0.8 K/UL    ABS. BASOPHILS 0.0 0.0 - 0.2 K/UL    ABS. IMM. GRANS. 0.0 0.0 - 0.5 K/UL   METABOLIC PANEL, COMPREHENSIVE    Collection Time: 02/08/18 11:16 PM   Result Value Ref Range    Sodium 126 (L) 136 - 145 mmol/L    Potassium 3.2 (L) 3.5 - 5.1 mmol/L    Chloride 88 (L) 98 - 107 mmol/L    CO2 30 21 - 32 mmol/L    Anion gap 8 7 - 16 mmol/L    Glucose 170 (H) 65 - 100 mg/dL    BUN 6 (L) 8 - 23 MG/DL    Creatinine 0.43 (L) 0.6 - 1.0 MG/DL    GFR est AA >60 >60 ml/min/1.73m2    GFR est non-AA >60 >60 ml/min/1.73m2    Calcium 8.7 8.3 - 10.4 MG/DL    Bilirubin, total 0.7 0.2 - 1.1 MG/DL    ALT (SGPT) 30 12 - 65 U/L    AST (SGOT) 32 15 - 37 U/L    Alk.  phosphatase 143 (H) 50 - 136 U/L    Protein, total 6.6 6.3 - 8.2 g/dL    Albumin 3.5 3.2 - 4.6 g/dL    Globulin 3.1 2.3 - 3.5 g/dL    A-G Ratio 1.1 (L) 1.2 - 3.5     POC TROPONIN-I    Collection Time: 02/08/18 11:19 PM   Result Value Ref Range    Troponin-I (POC) 0.02 0.02 - 0.05 ng/ml   POC LACTIC ACID    Collection Time: 02/08/18 11:23 PM   Result Value Ref Range    Lactic Acid (POC) 0.9 0.5 - 1.9 mmol/L       All Micro Results     None          Other Studies:  Xr Chest Port    Result Date: 2/8/2018  EXAM:  XR CHEST PORT INDICATION:  SOB COMPARISON:  8/15/2017 FINDINGS: A portable AP radiograph of the chest was obtained at 2316 hours. The patient is on a cardiac monitor. Diffuse increased interstitial markings. .  The cardiac and mediastinal contours and pulmonary vascularity are normal.  The bones and soft tissues are grossly within normal limits. IMPRESSION: Acute interstitial pulmonary edema. Assessment and Plan:     Hospital Problems as of 2/9/2018  Date Reviewed: 9/13/2016          Codes Class Noted - Resolved POA    Hyponatremia ICD-10-CM: E87.1  ICD-9-CM: 276.1  2/9/2018 - Present Yes        * (Principal)Acute pulmonary edema ([facility] [address] ICD-10-CM: J81.0  ICD-9-CM: 518.4  2/9/2018 - Present Yes        Acute respiratory failure with hypoxia (HCC) ICD-10-CM: J96.01  ICD-9-CM: 518.81  2/9/2018 - Present Yes        DNR (do not resuscitate) (Chronic) ICD-10-CM: Z66  ICD-9-CM: V49.86  9/11/2016 - Present Yes        PAF (paroxysmal atrial fibrillation) (HCC) (Chronic) ICD-10-CM: I48.0  ICD-9-CM: 427.31  9/10/2016 - Present Yes        HTN (hypertension) (Chronic) ICD-10-CM: I10  ICD-9-CM: 401.9  8/18/2016 - Present Yes              PLAN:  · Admit to inpatient  · Seems to have CHF despite pt saying she doesn't. But will check 2d echo; no recents on file here or Care Everywhere. Got 1 dose of IV lasix in ER. Will hold further lasix pending re-eval later today and 2d echo results. BNP not that elevated. · Recheck BMP later this morning for hypoNa after getting lasix  · Replace K. Check mg  · Cont home meds for stable chronic conditions    DVT ppx:  heparin  Anticipated DC needs:  Ppd ordered.   SW/PT/OT consulted  Code status:  DNR  Estimated LOS:  Greater than 2 midnights  Risk:  high    Signed:  Liliane German MD

## 2018-02-09 NOTE — ED PROVIDER NOTES
HPI Comments: 80-year-old female complaining of shortness of breath pain with deep inspiration. Patient fell on right side 10 days ago bruising and injuring her right chest wall. Now when she coughs or takes a deep breath she has pain there. Her initial SaO2 with EMS was 90%. Patient is a 80 y.o. female presenting with shortness of breath. The history is provided by the patient. Shortness of Breath   This is a new problem. The problem occurs continuously. The current episode started 12 to 24 hours ago. The problem has not changed since onset. Associated symptoms include chest pain. Pertinent negatives include no fever, no headaches, no coryza, no ear pain, no neck pain, no cough, no PND, no orthopnea, no abdominal pain, no rash and no leg pain. She has tried nothing for the symptoms. The treatment provided no relief. She has had prior hospitalizations. Associated medical issues include COPD. Past Medical History:   Diagnosis Date    Atrial fibrillation ([address]     CAD (coronary artery disease)     afib    Chest pain 9/13/2016    Gastrointestinal disorder irritable bowel    Hypertension        Past Surgical History:   Procedure Laterality Date    HX CHOLECYSTECTOMY      Colostomy reversal    HX ORTHOPAEDIC      left hip replacement, rt pelvic fx    HX OTHER SURGICAL      Extensive abdominal surgical history         No family history on file. Social History     Social History    Marital status:      Spouse name: N/A    Number of children: N/A    Years of education: N/A     Occupational History    Not on file. Social History Main Topics    Smoking status: Never Smoker    Smokeless tobacco: Never Used    Alcohol use No    Drug use: No    Sexual activity: No     Other Topics Concern    Not on file     Social History Narrative         ALLERGIES: Sulfa (sulfonamide antibiotics)    Review of Systems   Constitutional: Negative. Negative for activity change and fever.    HENT: Negative. Negative for ear pain. Eyes: Negative. Respiratory: Positive for shortness of breath. Negative for cough. Cardiovascular: Positive for chest pain. Negative for orthopnea and PND. Gastrointestinal: Negative. Negative for abdominal pain. Genitourinary: Negative. Musculoskeletal: Negative. Negative for neck pain. Skin: Negative. Negative for rash. Neurological: Negative. Negative for headaches. Psychiatric/Behavioral: Negative. All other systems reviewed and are negative. Vitals:    02/08/18 2259 02/08/18 2310 02/08/18 2322   BP: 180/81  169/79   Pulse: 73 72 72   Resp: 20 28 15   Temp: 98.3 °F (36.8 °C)     SpO2: 94% 92% 96%   Weight: 63.5 kg (140 lb)     Height: 5' 1\" (1.549 m)              Physical Exam   Constitutional: She is oriented to person, place, and time. She appears well-developed and well-nourished. No distress. HENT:   Head: Normocephalic and atraumatic. Right Ear: External ear normal.   Left Ear: External ear normal.   Nose: Nose normal.   Mouth/Throat: Oropharynx is clear and moist. No oropharyngeal exudate. Eyes: Conjunctivae and EOM are normal. Pupils are equal, round, and reactive to light. Right eye exhibits no discharge. Left eye exhibits no discharge. No scleral icterus. Neck: Normal range of motion. Neck supple. No JVD present. No tracheal deviation present. Cardiovascular: Normal rate, regular rhythm and intact distal pulses. Pulmonary/Chest: Effort normal. No stridor. No respiratory distress. She has decreased breath sounds in the right lower field. She has no wheezes. She has rales in the right middle field and the left middle field. She exhibits no tenderness. Abdominal: Soft. Bowel sounds are normal. She exhibits no distension and no mass. There is no tenderness. Musculoskeletal: Normal range of motion. She exhibits no edema or tenderness. Neurological: She is alert and oriented to person, place, and time.  No cranial nerve deficit. Skin: Skin is warm and dry. No rash noted. She is not diaphoretic. No erythema. No pallor. Psychiatric: She has a normal mood and affect. Her behavior is normal. Thought content normal.   Nursing note and vitals reviewed. MDM  Number of Diagnoses or Management Options  Diagnosis management comments: Pain seems more pleuritic patient initially SaO2 of 90 on room air improved with oxygen. Patient has some pulmonary edema. On chest x-ray. She is also initially hypertensive. Begin pain medicine for her right-sided rib pain and Lasix. She diuresis name pains as SaO2 greater than 92% she be sent home.        Amount and/or Complexity of Data Reviewed  Clinical lab tests: ordered and reviewed  Tests in the radiology section of CPT®: ordered and reviewed  Tests in the medicine section of CPT®: ordered and reviewed          ED Course       Procedures

## 2018-02-09 NOTE — PROGRESS NOTES
TRANSFER - IN REPORT:    Verbal report received from previous RN(name) on Arabella Ugarte  being received from ED(unit) for routine progression of care      Report consisted of patients Situation, Background, Assessment and   Recommendations(SBAR). Information from the following report(s) SBAR, Kardex, ED Summary, Intake/Output, MAR and Recent Results was reviewed with the receiving nurse. Opportunity for questions and clarification was provided. Assessment completed upon patients arrival to unit and care assumed.

## 2018-02-09 NOTE — ED TRIAGE NOTES
Pt presents to ER via EMS from [facility] after waking 30 min PTA for SOB, initial sats 90%, started on 5 mg albuterol w/ improved BS per EMS, increased anxiety w/ BP elevated at this time. Pt fell prior to admission to facility w/ bruising to R flank/ribs x 10 days ago.

## 2018-02-10 PROBLEM — I50.33 DIASTOLIC CHF, ACUTE ON CHRONIC (HCC): Status: ACTIVE | Noted: 2018-02-10

## 2018-02-10 LAB
ANION GAP SERPL CALC-SCNC: 4 MMOL/L (ref 7–16)
BUN SERPL-MCNC: 7 MG/DL (ref 8–23)
CALCIUM SERPL-MCNC: 8.1 MG/DL (ref 8.3–10.4)
CHLORIDE SERPL-SCNC: 89 MMOL/L (ref 98–107)
CO2 SERPL-SCNC: 33 MMOL/L (ref 21–32)
CREAT SERPL-MCNC: 0.48 MG/DL (ref 0.6–1)
FLUAV AG NPH QL IA: NEGATIVE
FLUBV AG NPH QL IA: NEGATIVE
GLUCOSE SERPL-MCNC: 131 MG/DL (ref 65–100)
MAGNESIUM SERPL-MCNC: 1.8 MG/DL (ref 1.8–2.4)
POTASSIUM SERPL-SCNC: 3.1 MMOL/L (ref 3.5–5.1)
SODIUM SERPL-SCNC: 126 MMOL/L (ref 136–145)

## 2018-02-10 PROCEDURE — 97110 THERAPEUTIC EXERCISES: CPT

## 2018-02-10 PROCEDURE — 77010033678 HC OXYGEN DAILY

## 2018-02-10 PROCEDURE — 74011000250 HC RX REV CODE- 250: Performed by: INTERNAL MEDICINE

## 2018-02-10 PROCEDURE — 74011250637 HC RX REV CODE- 250/637: Performed by: INTERNAL MEDICINE

## 2018-02-10 PROCEDURE — 97530 THERAPEUTIC ACTIVITIES: CPT

## 2018-02-10 PROCEDURE — 74011250636 HC RX REV CODE- 250/636: Performed by: INTERNAL MEDICINE

## 2018-02-10 PROCEDURE — 65270000029 HC RM PRIVATE

## 2018-02-10 PROCEDURE — 94640 AIRWAY INHALATION TREATMENT: CPT

## 2018-02-10 PROCEDURE — 83735 ASSAY OF MAGNESIUM: CPT | Performed by: INTERNAL MEDICINE

## 2018-02-10 PROCEDURE — 97535 SELF CARE MNGMENT TRAINING: CPT

## 2018-02-10 PROCEDURE — 80048 BASIC METABOLIC PNL TOTAL CA: CPT | Performed by: INTERNAL MEDICINE

## 2018-02-10 PROCEDURE — 87804 INFLUENZA ASSAY W/OPTIC: CPT | Performed by: INTERNAL MEDICINE

## 2018-02-10 PROCEDURE — 36415 COLL VENOUS BLD VENIPUNCTURE: CPT | Performed by: INTERNAL MEDICINE

## 2018-02-10 PROCEDURE — 94760 N-INVAS EAR/PLS OXIMETRY 1: CPT

## 2018-02-10 RX ORDER — POTASSIUM CHLORIDE 20 MEQ/1
20 TABLET, EXTENDED RELEASE ORAL 2 TIMES DAILY
Status: DISCONTINUED | OUTPATIENT
Start: 2018-02-10 | End: 2018-02-10

## 2018-02-10 RX ORDER — ALBUTEROL SULFATE 0.83 MG/ML
2.5 SOLUTION RESPIRATORY (INHALATION)
Status: DISCONTINUED | OUTPATIENT
Start: 2018-02-10 | End: 2018-02-12 | Stop reason: HOSPADM

## 2018-02-10 RX ORDER — MAGNESIUM SULFATE HEPTAHYDRATE 40 MG/ML
2 INJECTION, SOLUTION INTRAVENOUS ONCE
Status: COMPLETED | OUTPATIENT
Start: 2018-02-10 | End: 2018-02-10

## 2018-02-10 RX ORDER — POTASSIUM CHLORIDE 20MEQ/15ML
20 LIQUID (ML) ORAL 2 TIMES DAILY WITH MEALS
Status: DISCONTINUED | OUTPATIENT
Start: 2018-02-10 | End: 2018-02-11

## 2018-02-10 RX ADMIN — Medication 10 ML: at 22:08

## 2018-02-10 RX ADMIN — PANTOPRAZOLE SODIUM 40 MG: 40 TABLET, DELAYED RELEASE ORAL at 07:48

## 2018-02-10 RX ADMIN — ASPIRIN 325 MG: 325 TABLET, DELAYED RELEASE ORAL at 10:07

## 2018-02-10 RX ADMIN — FUROSEMIDE 20 MG: 10 INJECTION, SOLUTION INTRAMUSCULAR; INTRAVENOUS at 22:08

## 2018-02-10 RX ADMIN — HEPARIN SODIUM 5000 UNITS: 5000 INJECTION, SOLUTION INTRAVENOUS; SUBCUTANEOUS at 02:16

## 2018-02-10 RX ADMIN — POTASSIUM CHLORIDE 20 MEQ: 20 SOLUTION ORAL at 17:33

## 2018-02-10 RX ADMIN — Medication 10 ML: at 05:28

## 2018-02-10 RX ADMIN — FUROSEMIDE 20 MG: 10 INJECTION, SOLUTION INTRAMUSCULAR; INTRAVENOUS at 09:57

## 2018-02-10 RX ADMIN — BENAZEPRIL HYDROCHLORIDE 5 MG: 10 TABLET ORAL at 10:08

## 2018-02-10 RX ADMIN — HYDROCODONE BITARTRATE AND ACETAMINOPHEN 1 TABLET: 5; 325 TABLET ORAL at 15:19

## 2018-02-10 RX ADMIN — HEPARIN SODIUM 5000 UNITS: 5000 INJECTION, SOLUTION INTRAVENOUS; SUBCUTANEOUS at 17:33

## 2018-02-10 RX ADMIN — POLYETHYLENE GLYCOL 3350 17 G: 17 POWDER, FOR SOLUTION ORAL at 10:12

## 2018-02-10 RX ADMIN — METOPROLOL SUCCINATE 100 MG: 100 TABLET, EXTENDED RELEASE ORAL at 10:07

## 2018-02-10 RX ADMIN — AMLODIPINE BESYLATE 5 MG: 5 TABLET ORAL at 10:07

## 2018-02-10 RX ADMIN — MAGNESIUM SULFATE IN WATER 2 G: 40 INJECTION, SOLUTION INTRAVENOUS at 17:33

## 2018-02-10 RX ADMIN — POTASSIUM CHLORIDE 20 MEQ: 20 SOLUTION ORAL at 12:00

## 2018-02-10 RX ADMIN — Medication 6 ML: at 15:20

## 2018-02-10 RX ADMIN — LORAZEPAM 1 MG: 1 TABLET ORAL at 22:08

## 2018-02-10 RX ADMIN — ALBUTEROL SULFATE 2.5 MG: 2.5 SOLUTION RESPIRATORY (INHALATION) at 10:39

## 2018-02-10 RX ADMIN — HEPARIN SODIUM 5000 UNITS: 5000 INJECTION, SOLUTION INTRAVENOUS; SUBCUTANEOUS at 10:03

## 2018-02-10 NOTE — PROGRESS NOTES
Pt arrived to room and assessment completed at this time. Oriented pt to room/unit. VS done and are stable. Celeste intact and draining. PIV intact and is patent. Pt c/o no pain. Pt confused at times and bed alarm turned on. Call light in reach.

## 2018-02-10 NOTE — PROGRESS NOTES
Problem: Mobility Impaired (Adult and Pediatric)  Goal: *Acute Goals and Plan of Care (Insert Text)  LTGs:  (1.)Ms. Erna Singleton will move from supine to sit and sit to supine  with SUPERVISION within 5 day(s). (2.)Ms. Erna Singleton will transfer from bed to chair and chair to bed with SUPERVISION using the least restrictive device within 5 day(s). (3.)Ms. Erna Singleton will ambulate with SUPERVISION for 200 feet with the least restrictive device and stable O2 sat >90% within 5 day(s). 94.)Pt. Will increase B LE strength 1/2 grade to improve functional mobility within 5 days      ________________________________________________________________________________________________    PHYSICAL THERAPY: Daily Note, Treatment Day: 1st, PM 2/10/2018  INPATIENT: Hospital Day: 3  [place] / [place] / Product Type: Medicare /      NAME/AGE/GENDER: Christos Weir is a 80 y.o. female   PRIMARY DIAGNOSIS: Acute pulmonary edema ([facility] [address] Acute pulmonary edema (HCC) Acute pulmonary edema (HCC)        ICD-10: Treatment Diagnosis:   · Generalized Muscle Weakness (M62.81)  · Difficulty in walking, Not elsewhere classified (R26.2)  · History of falling (Z91.81)   Precaution/Allergies:  Sulfa (sulfonamide antibiotics)      ASSESSMENT:     Ms. Erna Singleton presents with acute pulmonary edema and demonstrates decreased general strength and endurance, standing balance and functional mobility. RN in the room when PT entered. RN stated that patient tested negative for the flu. Patient states that she just got back into the bed, so she did not want to do anything. Patient did agree to do some LE exercises in the bed. Will continue to progress as tolerated. This section established at most recent assessment   PROBLEM LIST (Impairments causing functional limitations):  1. Decreased Strength  2. Decreased Transfer Abilities  3. Decreased Ambulation Ability/Technique  4. Decreased Balance  5. Decreased Activity Tolerance  6.  Increased Fatigue  7. Increased Shortness of Breath  8. Decreased Flexibility/Joint Mobility  9. Decreased [place] with Home Exercise Program   INTERVENTIONS PLANNED: (Benefits and precautions of physical therapy have been discussed with the patient.)  1. Balance Exercise  2. Bed Mobility  3. Gait Training  4. Range of Motion (ROM)  5. Therapeutic Activites  6. Therapeutic Exercise/Strengthening  7. Transfer Training  8. endurance activities     TREATMENT PLAN: Frequency/Duration: daily for duration of hospital stay  Rehabilitation Potential For Stated Goals: Good     RECOMMENDED REHABILITATION/EQUIPMENT: (at time of discharge pending progress): Due to the probability of continued deficits (see above) this patient may need continued skilled physical therapy after discharge. Equipment:    her friend states she uses a RW and str cane              HISTORY:   History of Present Injury/Illness (Reason for Referral): Admitted with acute pulmonary edema  Past Medical History/Comorbidities:   Ms. Renetta Acevedo  has a past medical history of Atrial fibrillation ([facility] [address] CAD (coronary artery disease); Chest pain (9/13/2016); Gastrointestinal disorder (irritable bowel); and Hypertension. Ms. Renetta Acevedo  has a past surgical history that includes hx cholecystectomy; hx other surgical; and hx orthopaedic. Social History/Living Environment:   Home Environment: [facility] [address] Name: S[place] Le[place]  # Steps to Enter: 0  One/Two Story Residence: One story  Living Alone: No  Support Systems: Assisted living, Friends \ neighbors  Patient Expects to be Discharged to[de-identified] Assisted living  Current DME Used/Available at Home: Nellie Bunk, straight, Walker, rolling  Tub or Shower Type: Shower  Prior Level of Function/Work/Activity:  Per her friend, she is able to perform her ADLs on her own.  She uses a RW or str cane with amb     Number of Personal Factors/Comorbidities that affect the Plan of Care: 1-2: MODERATE COMPLEXITY   EXAMINATION:   Most Recent Physical Functioning:   Gross Assessment:                  Posture:     Balance:    Bed Mobility:     Wheelchair Mobility:     Transfers:     Gait:            Body Structures Involved:  1. Lungs  2. Joints  3. Muscles Body Functions Affected:  1. Respiratory  2. Neuromusculoskeletal  3. Movement Related Activities and Participation Affected:  1. Mobility  2. Self Care   Number of elements that affect the Plan of Care: 4+: HIGH COMPLEXITY   CLINICAL PRESENTATION:   Presentation: Evolving clinical presentation with changing clinical characteristics: MODERATE COMPLEXITY   CLINICAL DECISION MAKIN [facility] Inpatient Short Form  How much difficulty does the patient currently have. .. Unable A Lot A Little None   1. Turning over in bed (including adjusting bedclothes, sheets and blankets)? [] 1   [] 2   [x] 3   [] 4   2. Sitting down on and standing up from a chair with arms ( e.g., wheelchair, bedside commode, etc.)   [] 1   [] 2   [x] 3   [] 4   3. Moving from lying on back to sitting on the side of the bed? [] 1   [] 2   [x] 3   [] 4   How much help from another person does the patient currently need. .. Total A Lot A Little None   4. Moving to and from a bed to a chair (including a wheelchair)? [] 1   [] 2   [x] 3   [] 4   5. Need to walk in hospital room? [] 1   [] 2   [x] 3   [] 4   6. Climbing 3-5 steps with a railing? [] 1   [x] 2   [] 3   [] 4   © , Trustees of [address] [place] [address], under license to [employer]. All rights reserved      Score:  Initial: 17 Most Recent: X (Date: -- )    Interpretation of Tool:  Represents activities that are increasingly more difficult (i.e. Bed mobility, Transfers, Gait). Score 24 23 22-20 19-15 14-10 9-7 6     Modifier CH CI CJ CK CL CM CN      ?  Mobility - Walking and Moving Around:     - CURRENT STATUS: CK - 40%-59% impaired, limited or restricted    - GOAL STATUS: CJ - 20%-39% impaired, limited or restricted    - D/C STATUS:  ---------------To be determined---------------  [place] / [place] / Product Type: Medicare /      Medical Necessity:     · Patient demonstrates good rehab potential due to higher previous functional level. Reason for Services/Other Comments:  · Patient continues to require present interventions due to patient's inability to perform functional mobility independently. Use of outcome tool(s) and clinical judgement create a POC that gives a: Clear prediction of patient's progress: LOW COMPLEXITY            TREATMENT:   (In addition to Assessment/Re-Assessment sessions the following treatments were rendered)   Pre-treatment Symptoms/Complaints:  \"I'm tired. I didn't sleep last night. \"  Pain: Initial: 0     Post Session:  0     Therapeutic Exercise: (15 Minutes):  Exercises per grid below to improve mobility and strength. Required minimal verbal cues to promote proper body alignment, promote proper body posture and promote proper body mechanics. Progressed repetitions and complexity of movement as indicated. Date:  2/10/18 Date:   Date:     Activity/Exercise Parameters Parameters Parameters   Ankle pumps X 15 reps B     Supine hip abd/add X 15 reps B     Supine heel slides X 15 reps B     Supine straight leg raises X 15 reps B     Short arc quads X 15 reps B     Supine hip add squeezes X 15 reps B                 Braces/Orthotics/Lines/Etc:   · spicer catheter  · O2 via nasal cannula, telemetry lines  ·    Treatment/Session Assessment:    · Response to Treatment:  Patient tolerated exercises well. · Interdisciplinary Collaboration:   o Physical Therapist  o Registered Nurse  · After treatment position/precautions:   o Supine in bed  o Bed/Chair-wheels locked  o Bed in low position  o Call light within reach  o RN notified  o Nurse at bedside  o Side rails x 2   · Compliance with Program/Exercises:  Will assess as treatment progresses. · Recommendations/Intent for next treatment session: \"Next visit will focus on advancements to more challenging activities and reduction in assistance provided\".   Total Treatment Duration:  PT Patient Time In/Time Out  Time In: 1515  Time Out: Fifi Mari

## 2018-02-10 NOTE — PROGRESS NOTES
Eating supper. Son at bedside. States she is dizzy but pain is gone. Reminded to not get up without assist. Bed/falls alarm is on.

## 2018-02-10 NOTE — PROGRESS NOTES
Shift assessment completed via doc flow sheet. Patient is resting quietly in bed at this time but answers to questions appropriately. Patient is dyspneic at rest. Lung sounds with inspiratory wheezing. Heart sounds S1, S2 auscultated and regular. Abdomen soft and non tender. Bowel sounds active to all 4 quadrants. Patient has a spicer cath which is draining clear yellow urine. IV flushed without difficulty. Patient denies pain and other needs at this time. Bed is locked and in low position. Bed rails x 3. Patient is encouraged to call for assistance. Call light within reach.

## 2018-02-10 NOTE — PROGRESS NOTES
Pt agitated at times. Says \"quit\" when working with pt. Appeared to try to grab at NGT. No distress noted.

## 2018-02-10 NOTE — PROGRESS NOTES
Hospitalist Progress Note    2/10/2018  Admit Date: 2018 10:56 PM   NAME: Ana Joseph   :  1927   MRN:  [id number]   Attending: Rolando Cornelius MD  PCP:  Minoo Urrutia MD    SUBJECTIVE:   Declan Howell is a 81 yo F admitted after midnight on 18 for acute pulmonary edema and suspected CHF. Given one dose of IV lasix, but not re-dosed due to hyponatremia, which may be related to acute CHF. She reports she is breathing some better, but desaturates to low 80s immediately with sitting up. Echo performed showing LVDD and mild aortic stenosis. 2/10- today, she reports she feels 'horrible' and is still short of breath. She states she just wants 'to go' because she is 80. She has had great urine output of 3.6 L over the last 24 hours. Has audible wheeze in conversation. She reports she had been on lasix in the past and this was stopped a few weeks ago by her cardiologist (Dr. Huber Haile) because she 'lost electrolytes.'  She had a temp of 100 yesterday, but has been afebrile since.       Review of Systems negative with exception of pertinent positives noted above  PHYSICAL EXAM     Visit Vitals    /83 (BP 1 Location: Right arm)    Pulse 63    Temp 97.8 °F (36.6 °C)    Resp 18    Ht 5' 1\" (1.549 m)    Wt 63.3 kg (139 lb 8 oz)    SpO2 95%    BMI 26.36 kg/m2      Temp (24hrs), Av.9 °F (37.2 °C), Min:97.8 °F (36.6 °C), Max:100 °F (37.8 °C)    Patient Vitals for the past 24 hrs:   Temp Pulse Resp BP SpO2   02/10/18 0803 97.8 °F (36.6 °C) 63 18 167/83 95 %   02/10/18 0359 99.4 °F (37.4 °C) 80 18 132/73 94 %   18 2353 98 °F (36.7 °C) 94 18 145/65 95 %   18 1810 99.6 °F (37.6 °C) 83 18 182/78 93 %   18 1645 100 °F (37.8 °C) 81 20 173/81 95 %   18 1631 - 65 - - 95 %   18 1613 98.4 °F (36.9 °C) 62 16 130/63 95 %       Oxygen Therapy  O2 Sat (%): 95 % (02/10/18 0803)  Pulse via Oximetry: 65 beats per minute (18 1631)  O2 Device: Nasal cannula (02/10/18 0359)  O2 Flow Rate (L/min): 2 l/min (02/10/18 0359)    Intake/Output Summary (Last 24 hours) at 02/10/18 0923  Last data filed at 02/10/18 0234   Gross per 24 hour   Intake                0 ml   Output             3650 ml   Net            -3650 ml      General: Appears slightly dyspneic in conversation   Lungs:  Rales bilaterally with wheeze noted. Heart:  Regular rate and rhythm,  No murmur, rub, or gallop  Abdomen: Soft, Non distended, Non tender, Positive bowel sounds  Extremities: No cyanosis, clubbing, trace leg edema  Neurologic:  No focal deficits    Recent Results (from the past 24 hour(s))   METABOLIC PANEL, BASIC    Collection Time: 02/09/18  5:38 PM   Result Value Ref Range    Sodium 127 (L) 136 - 145 mmol/L    Potassium 4.2 3.5 - 5.1 mmol/L    Chloride 91 (L) 98 - 107 mmol/L    CO2 30 21 - 32 mmol/L    Anion gap 6 (L) 7 - 16 mmol/L    Glucose 130 (H) 65 - 100 mg/dL    BUN 8 8 - 23 MG/DL    Creatinine 0.52 (L) 0.6 - 1.0 MG/DL    GFR est AA >60 >60 ml/min/1.73m2    GFR est non-AA >60 >60 ml/min/1.73m2    Calcium 8.7 8.3 - 10.4 MG/DL   MAGNESIUM    Collection Time: 02/09/18  5:38 PM   Result Value Ref Range    Magnesium 1.6 (L) 1.8 - 2.4 mg/dL   METABOLIC PANEL, BASIC    Collection Time: 02/10/18  6:58 AM   Result Value Ref Range    Sodium 126 (L) 136 - 145 mmol/L    Potassium 3.1 (L) 3.5 - 5.1 mmol/L    Chloride 89 (L) 98 - 107 mmol/L    CO2 33 (H) 21 - 32 mmol/L    Anion gap 4 (L) 7 - 16 mmol/L    Glucose 131 (H) 65 - 100 mg/dL    BUN 7 (L) 8 - 23 MG/DL    Creatinine 0.48 (L) 0.6 - 1.0 MG/DL    GFR est AA >60 >60 ml/min/1.73m2    GFR est non-AA >60 >60 ml/min/1.73m2    Calcium 8.1 (L) 8.3 - 10.4 MG/DL   MAGNESIUM    Collection Time: 02/10/18  6:58 AM   Result Value Ref Range    Magnesium 1.8 1.8 - 2.4 mg/dL     Imaging:   XR CHEST PORT   Final Result   IMPRESSION: Acute interstitial pulmonary edema.               Results for orders placed or performed during the hospital encounter of 18   2D ECHO COMPLETE ADULT (TTE) W OR WO CONTR    Narrative    [facility] [address] [place], [address] [facility]  [phone]    Transthoracic Echocardiogram  2D, M-mode, Doppler, and Color Doppler    Patient: Danita Gibson  MR #: [id number]  : 04-Dec-1927  Age: 80 years  Gender: Female  Study date: 2018  Account #: [de-identified]  Height: 61 in  Weight: 139.7 lb  BSA: 1.62 mï¾²  Status:Routine  Location: [place] [address]  BP: 178/ 79    Allergies: SULFA (SULFONAMIDE ANTIBIOTICS)    Sonographer:  Janay Miller RD  Group:  [id number] [facility]  Referring Physician:  Liu Youngblood. Ngoc Deutsch MD  Reading Physician:  Sumi Lay. Victor Hugo Tinsley MD [facility]    INDICATIONS: Check CHF    *Patient would not allow for additional pedoff images. *    PROCEDURE: This was a routine study. A transthoracic echocardiogram was  performed. The study included complete 2D imaging, M-mode, complete spectral  Doppler, and color Doppler. Image quality was adequate. LEFT VENTRICLE: Size was normal. Systolic function was normal. Ejection  fraction was estimated in the range of 60 % to 65 %. There were no regional  wall motion abnormalities. Wall thickness was mildly increased. Doppler  parameters were consistent with mild diastolic dysfunction (grade 1). RIGHT VENTRICLE: The size was normal. Systolic function was normal. The  tricuspid jet envelope definition was inadequate for estimation of RV   systolic  pressure. LEFT ATRIUM: The atrium was markedly dilated. RIGHT ATRIUM: The atrium was mildly to moderately dilated. SYSTEMIC VEINS: IVC: The inferior vena cava was normal in size and course. AORTIC VALVE: The valve was probably trileaflet. Leaflets exhibited moderate  calcification. The aortic valve area by the continuity equation was 1.8 cm2. The peak velocity was 2.45 m/s. The mean pressure gradient was 13 mmHg. The  findings were most consistent with mild aortic stenosis.  The peak pressure  gradient was 24 mmHg. There was mild regurgitation. SVi- 54, Di- 0.58. MITRAL VALVE: Valve structure was normal. There was no evidence for stenosis. There was mild regurgitation. TRICUSPID VALVE: The valve structure was normal. There was no evidence for  stenosis. There was mild regurgitation. PULMONIC VALVE: Not well visualized. There was no evidence for stenosis. There  was trivial regurgitation. PERICARDIUM: A trivial pericardial effusion was identified. AORTA: The root exhibited normal size. SUMMARY:    -  Left ventricle: Systolic function was normal. Ejection fraction was  estimated in the range of 60 % to 65 %. There were no regional wall motion  abnormalities. Wall thickness was mildly increased. Doppler parameters were  consistent with mild diastolic dysfunction (grade 1). -  Left atrium: The atrium was markedly dilated. -  Right atrium: The atrium was mildly to moderately dilated. -  Aortic valve: The valve was probably trileaflet. Leaflets exhibited   moderate  calcification. The aortic valve area by the continuity equation was 1.8 cm2. The findings were most consistent with mild aortic stenosis. -  Mitral valve: There was mild regurgitation.    -  Tricuspid valve: There was mild regurgitation.    -  Pericardium: A trivial pericardial effusion was identified. SYSTEM MEASUREMENT TABLES    2D mode  AoR Diam (2D): 2.9 cm  LA Dimension (2D): 2.3 cm  Left Atrium Systolic Volume Index; Method of Disks, Biplane; 2D mode;: 49.4  ml/m2  IVS/LVPW (2D): 0.9  IVSd (2D): 1.2 cm  LVIDd (2D): 3.3 cm  LVIDs (2D): 2.5 cm  LVOT Area (2D): 3.1 cm2  LVPWd (2D): 1.3 cm    Tissue Doppler Imaging  LV Peak Early Bates Tissue Vinayak; Mean; Lateral MA (TDI): 7.1 cm/s  LV Peak Early Bates Tissue Vinayak; Medial MA (TDI): 6 cm/s    Unspecified Scan Mode  Peak Grad; Mean; Antegrade Flow: 17 mm[Hg]  Vmax;  Antegrade Flow: 178 cm/s  LVOT Diam: 2 cm  MV Peak Vinayak/LV Peak Tissue Vinayak E-Wave; Lateral MA: 14.5  MV Peak Vinayak/LV Peak Tissue Vinayak E-Wave; Medial MA: 17.1    Prepared and signed by    Deepak Fox MD [facility]  Signed 09-Feb-2018 15:35:04         ASSESSMENT      Hospital Problems as of 2/10/2018  Date Reviewed: 9/13/2016          Codes Class Noted - Resolved POA    Hyponatremia ICD-10-CM: E87.1  ICD-9-CM: 276.1  2/9/2018 - Present Yes        * (Principal)Acute pulmonary edema ([address] ICD-10-CM: J81.0  ICD-9-CM: 518.4  2/9/2018 - Present Yes        Acute respiratory failure with hypoxia (HCC) ICD-10-CM: J96.01  ICD-9-CM: 518.81  2/9/2018 - Present Yes        DNR (do not resuscitate) (Chronic) ICD-10-CM: Z66  ICD-9-CM: V49.86  9/11/2016 - Present Yes        PAF (paroxysmal atrial fibrillation) (HCC) (Chronic) ICD-10-CM: I48.0  ICD-9-CM: 427.31  9/10/2016 - Present Yes        HTN (hypertension) (Chronic) ICD-10-CM: I10  ICD-9-CM: 401.9  8/18/2016 - Present Yes            Plan:  · Continue IV lasix 20 mg IV q 12 hours. · Diuresing well currently. Recheck CXR tomorrow AM.  · Low grade fever- only occurred once and has been afebrile since yesterday. Monitor and check cultures if it occurs again. · Hyponatremia- sodium relatively stable. Possibly due to CHF exacerbation. Monitor electrolytes. · Hypokalemia- replete. · Continue PT. DVT Prophylaxis: Heparin    Signed By: Margie Weldon.  Sneha Lundberg MD     February 10, 2018

## 2018-02-10 NOTE — PROGRESS NOTES
Problem: Self Care Deficits Care Plan (Adult)  Goal: *Acute Goals and Plan of Care (Insert Text)  1. Patient will perform grooming with supervision. 2. Patient will perform Upper body dressing with supervision  3. Patient will perform lower body dressing with supervision  4. Patient will perform upper and lower body bathing with supervision. 5. Patient will perform toilet transfers with CGA. 6. Patient will perform shower transfer with CGA. 7. Patient will participate in 30 + minutes of ADL/ therapeutic exercise/therapeutic activity with min rest breaks to increase activity tolerance for self care. 8. Patient will perform ADL functional mobility in room with CGA. Goals to be achieved in 7 days. OCCUPATIONAL THERAPY: Daily Note, Treatment Day: 1st and PM 2/10/2018  INPATIENT: Hospital Day: 3  [place] / Product Type: Medicare /      NAME/AGE/GENDER: Heike Thompson is a 80 y.o. female   PRIMARY DIAGNOSIS:  Acute pulmonary edema (HCC) Acute pulmonary edema (HCC) Acute pulmonary edema (HCC)        ICD-10: Treatment Diagnosis:    · Generalized Muscle Weakness (M62.81)  · Other lack of cordination (R27.8)   Precautions/Allergies:     Sulfa (sulfonamide antibiotics)      ASSESSMENT:     Ms. Ashtyn Hernandez presents supine. Pt completed functional transfer with the assistance listed below. Pt completed grooming with the assistance listed below. She would benefit from skilled OT services. Will follow. This section established at most recent assessment   PROBLEM LIST (Impairments causing functional limitations):  1. Decreased Strength  2. Decreased ADL/Functional Activities  3. Decreased Transfer Abilities  4. Decreased Ambulation Ability/Technique  5. Decreased Balance  6. Decreased Activity Tolerance   INTERVENTIONS PLANNED: (Benefits and precautions of occupational therapy have been discussed with the patient.)  1. Activities of daily living training  2.  Adaptive equipment training  3. Balance training  4. Clothing management  5. Donning&doffing training  6. Neuromuscular re-eduation  7. Therapeutic activity  8. Therapeutic exercise  9. Home safety and DME recommendations     TREATMENT PLAN: Frequency/Duration: Follow patient 4 times to address above goals. Rehabilitation Potential For Stated Goals: Good     RECOMMENDED REHABILITATION/EQUIPMENT: (at time of discharge pending progress): Due to the probability of continued deficits (see above) this patient will likely need continued skilled occupational therapy after discharge. Equipment:    None at this time              OCCUPATIONAL PROFILE AND HISTORY:   History of Present Injury/Illness (Reason for Referral):  Decreased self care and functional mobility   Past Medical History/Comorbidities:   Ms. Raymon Amaya  has a past medical history of Atrial fibrillation ([facility] [address] CAD (coronary artery disease); Chest pain (9/13/2016); Gastrointestinal disorder (irritable bowel); and Hypertension. Ms. Raymon Amaya  has a past surgical history that includes hx cholecystectomy; hx other surgical; and hx orthopaedic. Social History/Living Environment:   Home Environment: [address] Name: [place]  # Steps to Enter: 0  One/Two Story Residence: One story  Living Alone: No  Support Systems: Assisted living, Friends \ neighbors  Patient Expects to be Discharged to[de-identified] Assisted living  Current DME Used/Available at Home: Caprice Fragmin, straight, Walker, rolling  Tub or Shower Type: Shower  Prior Level of Function/Work/Activity:  Unknown, but most likely mod I at her skilled nursing     Number of Personal Factors/Comorbidities that affect the Plan of Care: Brief history (0):  LOW COMPLEXITY   ASSESSMENT OF OCCUPATIONAL PERFORMANCE[de-identified]   Activities of Daily Living:           Basic ADLs (From Assessment) Complex ADLs (From Assessment)   Basic ADL  Feeding:  (staff feeding her lunch)  Oral Facial Hygiene/Grooming: Minimum assistance  Bathing:  Moderate assistance  Upper Body Dressing: Minimum assistance  Lower Body Dressing: Moderate assistance  Toileting: Total assistance (catheter)     Grooming/Bathing/Dressing Activities of Daily Living   Grooming  Washing Face: Supervision/set-up  Brushing Teeth: Supervision/set-up  Brushing/Combing Hair: Supervision/set-up Cognitive Retraining  Safety/Judgement: Fall prevention                       Bed/Mat Mobility  Supine to Sit: Minimum assistance  Sit to Stand: Minimum assistance  Bed to Chair: Minimum assistance       Most Recent Physical Functioning:   Gross Assessment:                  Posture:  Posture Assessment: Forward head, Rounded shoulders  Balance:  Sitting: Intact  Standing: With support Bed Mobility:  Supine to Sit: Minimum assistance  Wheelchair Mobility:     Transfers:  Sit to Stand: Minimum assistance  Bed to Chair: Minimum assistance     B UE ROM WFL           Patient Vitals for the past 6 hrs:   BP BP Patient Position SpO2 O2 Flow Rate (L/min) Pulse   02/10/18 1043 - - 97 % 2 l/min -   02/10/18 1156 137/70 At rest;Head of bed elevated (Comment degrees) 97 % - 63       Mental Status  Neurologic State: Alert  Orientation Level: Oriented to person  Cognition: Follows commands  Perception: Verbal, Tactile  Perseveration: Verbal cues provided, Tactile cues provided  Safety/Judgement: Fall prevention                          Physical Skills Involved:  1. Balance  2. Strength  3. Activity Tolerance Cognitive Skills Affected (resulting in the inability to perform in a timely and safe manner):  1. Perception  2. Comprehension Psychosocial Skills Affected:  1. Self-Awareness   Number of elements that affect the Plan of Care: 5+:  HIGH COMPLEXITY   CLINICAL DECISION MAKIN [facility] [address] [address] AM-PAC 6 Clicks   Daily Activity Inpatient Short Form  How much help from another person does the patient currently need. .. Total A Lot A Little None   1. Putting on and taking off regular lower body clothing?    [] 1 [x] 2   [] 3   [] 4   2. Bathing (including washing, rinsing, drying)? [] 1   [x] 2   [] 3   [] 4   3. Toileting, which includes using toilet, bedpan or urinal?   [x] 1   [] 2   [] 3   [] 4   4. Putting on and taking off regular upper body clothing? [] 1   [] 2   [x] 3   [] 4   5. Taking care of personal grooming such as brushing teeth? [] 1   [] 2   [x] 3   [] 4   6. Eating meals? [] 1   [] 2   [x] 3   [] 4   © 2007, Trustees of [address] [place] [address] [address] [address], under license to [employer]. All rights reserved      Score:  Initial:14 Most Recent: X (Date: -- )    Interpretation of Tool:  Represents activities that are increasingly more difficult (i.e. Bed mobility, Transfers, Gait). Score 24 23 22-20 19-15 14-10 9-7 6     Modifier CH CI CJ CK CL CM CN      ? Self Care:     - CURRENT STATUS: CL - 60%-79% impaired, limited or restricted    - GOAL STATUS: CJ - 20%-39% impaired, limited or restricted    - D/C STATUS:  ---------------To be determined---------------  [place] / Product Type: Medicare /      Medical Necessity:     · Patient is expected to demonstrate progress in balance, coordination and functional technique to decrease assistance required with self care and functional mobility  and improve safety during self care and functional mobility . Reason for Services/Other Comments:  · Patient continues to require skilled intervention due to decrased self care and functional mobility . Use of outcome tool(s) and clinical judgement create a POC that gives a: LOW COMPLEXITY         TREATMENT:   (In addition to Assessment/Re-Assessment sessions the following treatments were rendered)     Pre-treatment Symptoms/Complaints:    Pain: Initial:   Pain Intensity 1: 0  Post Session:  0/10     Self Care: (10): Procedure(s) (per grid) utilized to improve and/or restore self-care/home management as related to grooming.  Required min verbal cueing to facilitate activities of daily living skills. Therapeutic Activity: (    14): Therapeutic activities including functional transfer to improve mobility and strength. Required min verbal and tactile cues   to promote motor control of bilateral, upper extremity(s), lower extremity(s). Braces/Orthotics/Lines/Etc:   · IV  · O2   · CATHETER  Treatment/Session Assessment:    · Response to Treatement, weak but cooperative  · Interdisciplinary Collaboration:   o Certified Occupational Therapy Assistant  o Registered Nurse  · After treatment position/precautions:   o Up in chair  o Bed alarm/tab alert on  o Bed/Chair-wheels locked  o Bed in low position  o Call light within reach  o RN notified   · Compliance with Program/Exercises: compliant most of the time. · Recommendations/Intent for next treatment session: \"Next visit will focus on advancements to more challenging activities and reduction in assistance provided\".   Total Treatment Duration:  OT Patient Time In/Time Out  Time In: 1321  Time Out: [address] [facility] [place]

## 2018-02-11 ENCOUNTER — APPOINTMENT (OUTPATIENT)
Dept: GENERAL RADIOLOGY | Age: 83
DRG: 291 | End: 2018-02-11
Attending: INTERNAL MEDICINE
Payer: MEDICARE

## 2018-02-11 LAB
ANION GAP SERPL CALC-SCNC: 5 MMOL/L (ref 7–16)
BUN SERPL-MCNC: 7 MG/DL (ref 8–23)
CALCIUM SERPL-MCNC: 8.6 MG/DL (ref 8.3–10.4)
CHLORIDE SERPL-SCNC: 88 MMOL/L (ref 98–107)
CO2 SERPL-SCNC: 34 MMOL/L (ref 21–32)
CREAT SERPL-MCNC: 0.41 MG/DL (ref 0.6–1)
GLUCOSE SERPL-MCNC: 99 MG/DL (ref 65–100)
MAGNESIUM SERPL-MCNC: 2.1 MG/DL (ref 1.8–2.4)
POTASSIUM SERPL-SCNC: 4 MMOL/L (ref 3.5–5.1)
SODIUM SERPL-SCNC: 127 MMOL/L (ref 136–145)

## 2018-02-11 PROCEDURE — 74011250637 HC RX REV CODE- 250/637: Performed by: INTERNAL MEDICINE

## 2018-02-11 PROCEDURE — 74011250636 HC RX REV CODE- 250/636: Performed by: INTERNAL MEDICINE

## 2018-02-11 PROCEDURE — 94760 N-INVAS EAR/PLS OXIMETRY 1: CPT

## 2018-02-11 PROCEDURE — 80048 BASIC METABOLIC PNL TOTAL CA: CPT | Performed by: INTERNAL MEDICINE

## 2018-02-11 PROCEDURE — 77010033678 HC OXYGEN DAILY

## 2018-02-11 PROCEDURE — 65270000029 HC RM PRIVATE

## 2018-02-11 PROCEDURE — 83735 ASSAY OF MAGNESIUM: CPT | Performed by: INTERNAL MEDICINE

## 2018-02-11 PROCEDURE — 86580 TB INTRADERMAL TEST: CPT | Performed by: INTERNAL MEDICINE

## 2018-02-11 PROCEDURE — 36415 COLL VENOUS BLD VENIPUNCTURE: CPT | Performed by: INTERNAL MEDICINE

## 2018-02-11 PROCEDURE — 71045 X-RAY EXAM CHEST 1 VIEW: CPT

## 2018-02-11 PROCEDURE — 74011000302 HC RX REV CODE- 302: Performed by: INTERNAL MEDICINE

## 2018-02-11 PROCEDURE — 97110 THERAPEUTIC EXERCISES: CPT

## 2018-02-11 RX ORDER — POTASSIUM CHLORIDE 20MEQ/15ML
20 LIQUID (ML) ORAL DAILY
Status: DISCONTINUED | OUTPATIENT
Start: 2018-02-12 | End: 2018-02-12 | Stop reason: HOSPADM

## 2018-02-11 RX ORDER — FUROSEMIDE 20 MG/1
20 TABLET ORAL DAILY
Status: DISCONTINUED | OUTPATIENT
Start: 2018-02-12 | End: 2018-02-12 | Stop reason: HOSPADM

## 2018-02-11 RX ADMIN — ASPIRIN 325 MG: 325 TABLET, DELAYED RELEASE ORAL at 09:52

## 2018-02-11 RX ADMIN — HEPARIN SODIUM 5000 UNITS: 5000 INJECTION, SOLUTION INTRAVENOUS; SUBCUTANEOUS at 02:12

## 2018-02-11 RX ADMIN — Medication 10 ML: at 06:00

## 2018-02-11 RX ADMIN — POTASSIUM CHLORIDE 20 MEQ: 20 SOLUTION ORAL at 09:52

## 2018-02-11 RX ADMIN — POLYETHYLENE GLYCOL 3350 17 G: 17 POWDER, FOR SOLUTION ORAL at 09:57

## 2018-02-11 RX ADMIN — Medication 10 ML: at 22:00

## 2018-02-11 RX ADMIN — BENAZEPRIL HYDROCHLORIDE 5 MG: 10 TABLET ORAL at 09:52

## 2018-02-11 RX ADMIN — FUROSEMIDE 20 MG: 10 INJECTION, SOLUTION INTRAMUSCULAR; INTRAVENOUS at 09:52

## 2018-02-11 RX ADMIN — TUBERCULIN PURIFIED PROTEIN DERIVATIVE 5 UNITS: 5 INJECTION, SOLUTION INTRADERMAL at 14:57

## 2018-02-11 RX ADMIN — METOPROLOL SUCCINATE 100 MG: 100 TABLET, EXTENDED RELEASE ORAL at 09:53

## 2018-02-11 RX ADMIN — Medication 10 ML: at 14:58

## 2018-02-11 RX ADMIN — PANTOPRAZOLE SODIUM 40 MG: 40 TABLET, DELAYED RELEASE ORAL at 09:52

## 2018-02-11 RX ADMIN — HEPARIN SODIUM 5000 UNITS: 5000 INJECTION, SOLUTION INTRAVENOUS; SUBCUTANEOUS at 09:52

## 2018-02-11 RX ADMIN — AMLODIPINE BESYLATE 5 MG: 5 TABLET ORAL at 09:52

## 2018-02-11 NOTE — PROGRESS NOTES
Problem: Mobility Impaired (Adult and Pediatric)  Goal: *Acute Goals and Plan of Care (Insert Text)  LTGs:  (1.)Ms. Callum Aleman will move from supine to sit and sit to supine  with SUPERVISION within 5 day(s). (2.)Ms. Callum Aleman will transfer from bed to chair and chair to bed with SUPERVISION using the least restrictive device within 5 day(s). (3.)Ms. Callum Aleman will ambulate with SUPERVISION for 200 feet with the least restrictive device and stable O2 sat >90% within 5 day(s). 94.)Pt. Will increase B LE strength 1/2 grade to improve functional mobility within 5 days      ________________________________________________________________________________________________    PHYSICAL THERAPY: Daily Note, Treatment Day: 1st, PM 2/11/2018  INPATIENT: Hospital Day: 4  [place] / [place] / Product Type: Medicare /      NAME/AGE/GENDER: Sumeet Marcano is a 80 y.o. female   PRIMARY DIAGNOSIS: Acute pulmonary edema ([facility] Acute pulmonary edema ([facility] Acute pulmonary edema (HCC)        ICD-10: Treatment Diagnosis:   · Generalized Muscle Weakness (M62.81)  · Difficulty in walking, Not elsewhere classified (R26.2)  · History of falling (Z91.81)   Precaution/Allergies:  Sulfa (sulfonamide antibiotics)      ASSESSMENT:     Ms. Callum Aleman presents with acute pulmonary edema and demonstrates decreased general strength and endurance, standing balance and functional mobility. Patient agreeable to PT. \"I am so tired. I slept good but I am just tired this morning. \" Patient on room air per RN. SATS were maintained around 90 to 91% during session. Alerted RN to this. Patient also questioning about getting magnesium last night or not so asked RN and she will speak with patient. Left patient sitting in chair with alarm donned and without complaints    This section established at most recent assessment   PROBLEM LIST (Impairments causing functional limitations):  1. Decreased Strength  2.  Decreased Transfer Abilities  3. Decreased Ambulation Ability/Technique  4. Decreased Balance  5. Decreased Activity Tolerance  6. Increased Fatigue  7. Increased Shortness of Breath  8. Decreased Flexibility/Joint Mobility  9. Decreased [place] with Home Exercise Program   INTERVENTIONS PLANNED: (Benefits and precautions of physical therapy have been discussed with the patient.)  1. Balance Exercise  2. Bed Mobility  3. Gait Training  4. Range of Motion (ROM)  5. Therapeutic Activites  6. Therapeutic Exercise/Strengthening  7. Transfer Training  8. endurance activities     TREATMENT PLAN: Frequency/Duration: daily for duration of hospital stay  Rehabilitation Potential For Stated Goals: Good     RECOMMENDED REHABILITATION/EQUIPMENT: (at time of discharge pending progress): Due to the probability of continued deficits (see above) this patient may need continued skilled physical therapy after discharge. Equipment:    her friend states she uses a RW and str cane              HISTORY:   History of Present Injury/Illness (Reason for Referral): Admitted with acute pulmonary edema  Past Medical History/Comorbidities:   Ms. Luis Enrique Jj  has a past medical history of Atrial fibrillation ([facility] CAD (coronary artery disease); Chest pain (9/13/2016); Gastrointestinal disorder (irritable bowel); and Hypertension. Ms. Luis Enrqiue Jj  has a past surgical history that includes hx cholecystectomy; hx other surgical; and hx orthopaedic. Social History/Living Environment:   Home Environment: [address] Name: Hortencia Jenkins  # Steps to Enter: 0  One/Two Story Residence: One story  Living Alone: No  Support Systems: Assisted living, Friends \ neighbors  Patient Expects to be Discharged to[de-identified] Assisted living  Current DME Used/Available at Home: [employer], straight, Walker, rolling  Tub or Shower Type: Shower  Prior Level of Function/Work/Activity:  Per her friend, she is able to perform her ADLs on her own.  She uses a RW or str cane with amb Number of Personal Factors/Comorbidities that affect the Plan of Care: 1-2: MODERATE COMPLEXITY   EXAMINATION:   Most Recent Physical Functioning:   Gross Assessment:                  Posture:     Balance:  Standing: With support Bed Mobility:     Wheelchair Mobility:     Transfers:  Sit to Stand: Minimum assistance (stand for 10 seconds-repeated two times)  Stand to Sit: Minimum assistance  Gait:            Body Structures Involved:  1. Lungs  2. Joints  3. Muscles Body Functions Affected:  1. Respiratory  2. Neuromusculoskeletal  3. Movement Related Activities and Participation Affected:  1. Mobility  2. Self Care   Number of elements that affect the Plan of Care: 4+: HIGH COMPLEXITY   CLINICAL PRESENTATION:   Presentation: Evolving clinical presentation with changing clinical characteristics: MODERATE COMPLEXITY   CLINICAL DECISION MAKIN [facility] Inpatient Short Form  How much difficulty does the patient currently have. .. Unable A Lot A Little None   1. Turning over in bed (including adjusting bedclothes, sheets and blankets)? [] 1   [] 2   [x] 3   [] 4   2. Sitting down on and standing up from a chair with arms ( e.g., wheelchair, bedside commode, etc.)   [] 1   [] 2   [x] 3   [] 4   3. Moving from lying on back to sitting on the side of the bed? [] 1   [] 2   [x] 3   [] 4   How much help from another person does the patient currently need. .. Total A Lot A Little None   4. Moving to and from a bed to a chair (including a wheelchair)? [] 1   [] 2   [x] 3   [] 4   5. Need to walk in hospital room? [] 1   [] 2   [x] 3   [] 4   6. Climbing 3-5 steps with a railing? [] 1   [x] 2   [] 3   [] 4   © , Trustees of [address] [place] [address] [address] [address], under license to [employer].  All rights reserved      Score:  Initial: 17 Most Recent: X (Date: -- )    Interpretation of Tool:  Represents activities that are increasingly more difficult (i.e. Bed mobility, Transfers, Gait).   Score 24 23 22-20 19-15 14-10 9-7 6     Modifier CH CI CJ CK CL CM CN      ? Mobility - Walking and Moving Around:     - CURRENT STATUS: CK - 40%-59% impaired, limited or restricted    - GOAL STATUS: CJ - 20%-39% impaired, limited or restricted    - D/C STATUS:  ---------------To be determined---------------  [place] / [place] / Product Type: Medicare /      Medical Necessity:     · Patient demonstrates good rehab potential due to higher previous functional level. Reason for Services/Other Comments:  · Patient continues to require present interventions due to patient's inability to perform functional mobility independently. Use of outcome tool(s) and clinical judgement create a POC that gives a: Clear prediction of patient's progress: LOW COMPLEXITY            TREATMENT:   (In addition to Assessment/Re-Assessment sessions the following treatments were rendered)   Pre-treatment Symptoms/Complaints:  Patient with no complaints of pain  Pain: Initial: 0     Post Session:  0     Therapeutic Exercise: ( 15 minutes):  Exercises per grid below to improve mobility and strength. Required minimal verbal cues to promote proper body alignment, promote proper body posture and promote proper body mechanics. Progressed repetitions and complexity of movement as indicated. Patient sitting in recline chair upon entering. Seated exercises completed 1 x15-marching, LAQ's, ankle pumps. Ed to patient on deep breathing and relaxation. Sit to stand repeated two times for about 10 seconds static stance. Readjusted/scooted in seated multiple times forward and backward for posture/comfort. Braces/Orthotics/Lines/Etc:   · spicer catheter  Treatment/Session Assessment:    · Response to Treatment:  Patient tolerated exercises well.    · Interdisciplinary Collaboration:   o Physical Therapist  o Registered Nurse  · After treatment position/precautions:   o Up in chair  o Bed alarm/tab alert on  o Bed/Chair-wheels locked  o Call light within reach  o RN notified   · Compliance with Program/Exercises: Will assess as treatment progresses. · Recommendations/Intent for next treatment session: \"Next visit will focus on advancements to more challenging activities and reduction in assistance provided\".   Total Treatment Duration:  PT Patient Time In/Time Out  Time In: 1035  Time Out: [address] [facility] [address] [facility] PT

## 2018-02-11 NOTE — PROGRESS NOTES
Pt was weaned earlier from oxygen, checked o2 sat and pt was 86% on RA. Placed pt back on 2L at this time and pt came up to 94%. Notified respiratory.

## 2018-02-11 NOTE — PROGRESS NOTES
Hospitalist Progress Note    2018  Admit Date: 2018 10:56 PM   NAME: Heike Thompson   :  1927   MRN:  [id number]   Attending: Samantha Black MD  PCP:  Rufus Zhong MD    SUBJECTIVE:   Lata Gregory is a 79 yo F admitted after midnight on 18 for acute pulmonary edema and suspected CHF. Given one dose of IV lasix, but not re-dosed due to hyponatremia, which may be related to acute CHF. She reports she is breathing some better, but desaturates to low 80s immediately with sitting up. Echo performed showing LVDD and mild aortic stenosis. 2/10- today, she reports she feels 'horrible' and is still short of breath. She states she just wants 'to go' because she is 80. She has had great urine output of 3.6 L over the last 24 hours. Has audible wheeze in conversation. She reports she had been on lasix in the past and this was stopped a few weeks ago by her cardiologist (Dr. Estefany Torres) because she 'lost electrolytes.'  She had a temp of 100 yesterday, but has been afebrile since. - she reports her breathing is much improved. She is now on room air. Reports she feels tired and weak, but she slept well. PT worked with her and she is quite weak and she only was stood. She reports she feels slightly dizzy. She has diuresed 6.8 liters since admission.         Review of Systems negative with exception of pertinent positives noted above  PHYSICAL EXAM     Visit Vitals    /70 (BP 1 Location: Left arm, BP Patient Position: At rest)    Pulse 80    Temp 97.8 °F (36.6 °C)    Resp 20    Ht 5' 1\" (1.549 m)    Wt 63.3 kg (139 lb 8 oz)    SpO2 91%    BMI 26.36 kg/m2      Temp (24hrs), Av.5 °F (36.4 °C), Min:96.1 °F (35.6 °C), Max:97.9 °F (36.6 °C)    Patient Vitals for the past 24 hrs:   Temp Pulse Resp BP SpO2   18 1108 97.8 °F (36.6 °C) 80 20 126/70 91 %   18 1101 - - - - 91 %   18 0919 - - - - 97 %   18 0738 97.9 °F (36.6 °C) 68 20 168/83 98 %   02/11/18 0305 97.9 °F (36.6 °C) (!) 58 24 178/79 98 %   02/11/18 0019 97.8 °F (36.6 °C) (!) 51 20 121/61 98 %   02/10/18 1948 96.1 °F (35.6 °C) (!) 57 18 163/67 93 %   02/10/18 1600 97.1 °F (36.2 °C) (!) 57 18 149/75 98 %   02/10/18 1156 97.9 °F (36.6 °C) 63 18 137/70 97 %       Oxygen Therapy  O2 Sat (%): 91 % (02/11/18 1108)  Pulse via Oximetry: 64 beats per minute (02/11/18 0919)  O2 Device: Room air (RN ok with room air. Tolerated session) (02/11/18 1101)  O2 Flow Rate (L/min): 2 l/min (02/11/18 0919)    Intake/Output Summary (Last 24 hours) at 02/11/18 1128  Last data filed at 02/11/18 1108   Gross per 24 hour   Intake                0 ml   Output             3180 ml   Net            -3180 ml      General: No acute distress, breathing easier   Lungs:  Mild bibasilar rales, wheezing resolved. Heart:  Regular rate and rhythm,  No murmur, rub, or gallop  Abdomen: Soft, Non distended, Non tender, Positive bowel sounds  Extremities: No cyanosis, clubbing, leg edema resolved  Neurologic:  No focal deficits    Recent Results (from the past 24 hour(s))   MAGNESIUM    Collection Time: 02/11/18  6:00 AM   Result Value Ref Range    Magnesium 2.1 1.8 - 2.4 mg/dL   METABOLIC PANEL, BASIC    Collection Time: 02/11/18  6:00 AM   Result Value Ref Range    Sodium 127 (L) 136 - 145 mmol/L    Potassium 4.0 3.5 - 5.1 mmol/L    Chloride 88 (L) 98 - 107 mmol/L    CO2 34 (H) 21 - 32 mmol/L    Anion gap 5 (L) 7 - 16 mmol/L    Glucose 99 65 - 100 mg/dL    BUN 7 (L) 8 - 23 MG/DL    Creatinine 0.41 (L) 0.6 - 1.0 MG/DL    GFR est AA >60 >60 ml/min/1.73m2    GFR est non-AA >60 >60 ml/min/1.73m2    Calcium 8.6 8.3 - 10.4 MG/DL     Imaging:   XR CHEST SNGL V   Final Result   IMPRESSION: No acute findings      XR CHEST PORT   Final Result   IMPRESSION: Acute interstitial pulmonary edema.               Results for orders placed or performed during the hospital encounter of 02/08/18   2D ECHO COMPLETE ADULT (TTE) W OR WO CONTR Narrative    [address] [place], [address] [facility]  [phone]    Transthoracic Echocardiogram  2D, M-mode, Doppler, and Color Doppler    Patient: Osmany Lilly  MR #: [id number]  : 04-Dec-1927  Age: 80 years  Gender: Female  Study date: 2018  Account #: [de-identified]  Height: 61 in  Weight: 139.7 lb  BSA: 1.62 mï¾²  Status:Routine  Location: [place] [address]  BP: 178/ 79    Allergies: SULFA (SULFONAMIDE ANTIBIOTICS)    Sonographer:  Claudene Freeman, [facility]  Group:  [facility]  Referring Physician:  Mary Dixon. Sheila Elias MD  Reading Physician:  Kash Tovar. Genaro Alston MD [facility]    INDICATIONS: Check CHF    *Patient would not allow for additional pedoff images. *    PROCEDURE: This was a routine study. A transthoracic echocardiogram was  performed. The study included complete 2D imaging, M-mode, complete spectral  Doppler, and color Doppler. Image quality was adequate. LEFT VENTRICLE: Size was normal. Systolic function was normal. Ejection  fraction was estimated in the range of 60 % to 65 %. There were no regional  wall motion abnormalities. Wall thickness was mildly increased. Doppler  parameters were consistent with mild diastolic dysfunction (grade 1). RIGHT VENTRICLE: The size was normal. Systolic function was normal. The  tricuspid jet envelope definition was inadequate for estimation of RV   systolic  pressure. LEFT ATRIUM: The atrium was markedly dilated. RIGHT ATRIUM: The atrium was mildly to moderately dilated. SYSTEMIC VEINS: IVC: The inferior vena cava was normal in size and course. AORTIC VALVE: The valve was probably trileaflet. Leaflets exhibited moderate  calcification. The aortic valve area by the continuity equation was 1.8 cm2. The peak velocity was 2.45 m/s. The mean pressure gradient was 13 mmHg. The  findings were most consistent with mild aortic stenosis. The peak pressure  gradient was 24 mmHg. There was mild regurgitation.  SVi- 54, Di- 0.58. MITRAL VALVE: Valve structure was normal. There was no evidence for stenosis. There was mild regurgitation. TRICUSPID VALVE: The valve structure was normal. There was no evidence for  stenosis. There was mild regurgitation. PULMONIC VALVE: Not well visualized. There was no evidence for stenosis. There  was trivial regurgitation. PERICARDIUM: A trivial pericardial effusion was identified. AORTA: The root exhibited normal size. SUMMARY:    -  Left ventricle: Systolic function was normal. Ejection fraction was  estimated in the range of 60 % to 65 %. There were no regional wall motion  abnormalities. Wall thickness was mildly increased. Doppler parameters were  consistent with mild diastolic dysfunction (grade 1). -  Left atrium: The atrium was markedly dilated. -  Right atrium: The atrium was mildly to moderately dilated. -  Aortic valve: The valve was probably trileaflet. Leaflets exhibited   moderate  calcification. The aortic valve area by the continuity equation was 1.8 cm2. The findings were most consistent with mild aortic stenosis. -  Mitral valve: There was mild regurgitation.    -  Tricuspid valve: There was mild regurgitation.    -  Pericardium: A trivial pericardial effusion was identified. SYSTEM MEASUREMENT TABLES    2D mode  AoR Diam (2D): 2.9 cm  LA Dimension (2D): 2.3 cm  Left Atrium Systolic Volume Index; Method of Disks, Biplane; 2D mode;: 49.4  ml/m2  IVS/LVPW (2D): 0.9  IVSd (2D): 1.2 cm  LVIDd (2D): 3.3 cm  LVIDs (2D): 2.5 cm  LVOT Area (2D): 3.1 cm2  LVPWd (2D): 1.3 cm    Tissue Doppler Imaging  LV Peak Early Bates Tissue Vinayak; Mean; Lateral MA (TDI): 7.1 cm/s  LV Peak Early Bates Tissue Vinayak; Medial MA (TDI): 6 cm/s    Unspecified Scan Mode  Peak Grad; Mean; Antegrade Flow: 17 mm[Hg]  Vmax;  Antegrade Flow: 178 cm/s  LVOT Diam: 2 cm  MV Peak Vinayak/LV Peak Tissue Vinayak E-Wave; Lateral MA: 14.5  MV Peak Vinayak/LV Peak Tissue Vinayak E-Wave; Medial MA: 17.1    Prepared and signed by    Sandra Hernández MD [facility]  Signed 09-Feb-2018 15:35:04         ASSESSMENT      Hospital Problems as of 2/11/2018  Date Reviewed: 9/13/2016          Codes Class Noted - Resolved POA    Diastolic CHF, acute on chronic [facility]) ICD-10-CM: I50.33  ICD-9-CM: 428.33, 428.0  2/10/2018 - Present Yes        Hyponatremia ICD-10-CM: E87.1  ICD-9-CM: 276.1  2/9/2018 - Present Yes        * (Principal)Acute pulmonary edema ([address] ICD-10-CM: J81.0  ICD-9-CM: 518.4  2/9/2018 - Present Yes        Acute respiratory failure with hypoxia (HCC) ICD-10-CM: J96.01  ICD-9-CM: 518.81  2/9/2018 - Present Yes        DNR (do not resuscitate) (Chronic) ICD-10-CM: Z66  ICD-9-CM: V49.86  9/11/2016 - Present Yes        PAF (paroxysmal atrial fibrillation) (HCC) (Chronic) ICD-10-CM: I48.0  ICD-9-CM: 427.31  9/10/2016 - Present Yes        HTN (hypertension) (Chronic) ICD-10-CM: I10  ICD-9-CM: 401.9  8/18/2016 - Present Yes            Plan:  · Diuresing well. Stop IV lasix now. Start po lasix 20 mg daily tomorrow. · CXR improved. · Hyponatremia- sodium stable. Possibly due to CHF exacerbation. Monitor electrolytes. · Feel benefit of lasix outweighs risk. Has been hyponatremic dating back to 2016 per chart review. · Hypokalemia- replete. · Continue PT. Dispo- will need STR on discharge- from [facility]. DVT Prophylaxis: Heparin    Signed By: Ana Paula Peace.  Josephine Muir MD     February 11, 2018

## 2018-02-11 NOTE — PROGRESS NOTES
Care Management Interventions  Transition of Care Consult (CM Consult): Discharge Planning  Current Support Network: Assisted Living  Plan discussed with Pt/Family/Caregiver: Yes  Discharge Location  Discharge Placement: Rehab Unit Subacute  80 F with ARF. From [facility]. Spoke to son Jose J TIMMONS with referral to [facility] for rehab. Referral made in [facility].

## 2018-02-11 NOTE — PROGRESS NOTES
Bedside report received from [facility]. Pt in bed resting, no noted distress.

## 2018-02-11 NOTE — PROGRESS NOTES
Received bedside shift report from off going nurse Destiny Aguiar RN which included SBAR, MAR, and Plan of Care. Patient is resting quietly with eyes closed and respirations present. Will continue to follow patient's progression through hourly rounding. Will meet all requests as needed and appropriate.

## 2018-02-11 NOTE — PROGRESS NOTES
Pt in bed resting, no noted distress. Assessment complete. Pt alert and oriented this PM. RR even, unlabored, no noted distress. Bed L/L, call bell is within reach and side rails are up x 2.

## 2018-02-12 VITALS
TEMPERATURE: 98 F | BODY MASS INDEX: 26.34 KG/M2 | HEART RATE: 98 BPM | RESPIRATION RATE: 20 BRPM | HEIGHT: 61 IN | SYSTOLIC BLOOD PRESSURE: 141 MMHG | WEIGHT: 139.5 LBS | DIASTOLIC BLOOD PRESSURE: 82 MMHG | OXYGEN SATURATION: 90 %

## 2018-02-12 LAB
ANION GAP SERPL CALC-SCNC: 10 MMOL/L (ref 7–16)
BUN SERPL-MCNC: 8 MG/DL (ref 8–23)
CALCIUM SERPL-MCNC: 8.7 MG/DL (ref 8.3–10.4)
CHLORIDE SERPL-SCNC: 87 MMOL/L (ref 98–107)
CO2 SERPL-SCNC: 30 MMOL/L (ref 21–32)
CREAT SERPL-MCNC: 0.45 MG/DL (ref 0.6–1)
GLUCOSE SERPL-MCNC: 121 MG/DL (ref 65–100)
MAGNESIUM SERPL-MCNC: 1.7 MG/DL (ref 1.8–2.4)
MM INDURATION POC: NORMAL MM (ref 0–5)
POTASSIUM SERPL-SCNC: 3.3 MMOL/L (ref 3.5–5.1)
PPD POC: NORMAL NEGATIVE
SODIUM SERPL-SCNC: 127 MMOL/L (ref 136–145)

## 2018-02-12 PROCEDURE — 74011250637 HC RX REV CODE- 250/637: Performed by: INTERNAL MEDICINE

## 2018-02-12 PROCEDURE — 97535 SELF CARE MNGMENT TRAINING: CPT

## 2018-02-12 PROCEDURE — 74011250636 HC RX REV CODE- 250/636: Performed by: INTERNAL MEDICINE

## 2018-02-12 PROCEDURE — 80048 BASIC METABOLIC PNL TOTAL CA: CPT | Performed by: INTERNAL MEDICINE

## 2018-02-12 PROCEDURE — 83735 ASSAY OF MAGNESIUM: CPT | Performed by: INTERNAL MEDICINE

## 2018-02-12 PROCEDURE — 97530 THERAPEUTIC ACTIVITIES: CPT

## 2018-02-12 PROCEDURE — 36415 COLL VENOUS BLD VENIPUNCTURE: CPT | Performed by: INTERNAL MEDICINE

## 2018-02-12 RX ORDER — FUROSEMIDE 20 MG/1
20 TABLET ORAL DAILY
Qty: 30 TAB | Refills: 0 | Status: SHIPPED | OUTPATIENT
Start: 2018-02-12 | End: 2021-08-30

## 2018-02-12 RX ORDER — POTASSIUM CHLORIDE 20MEQ/15ML
20 LIQUID (ML) ORAL DAILY
Qty: 480 ML | Refills: 0 | Status: SHIPPED | OUTPATIENT
Start: 2018-02-13 | End: 2021-06-04

## 2018-02-12 RX ADMIN — FUROSEMIDE 20 MG: 20 TABLET ORAL at 08:49

## 2018-02-12 RX ADMIN — POTASSIUM CHLORIDE 20 MEQ: 20 SOLUTION ORAL at 09:00

## 2018-02-12 RX ADMIN — PANTOPRAZOLE SODIUM 40 MG: 40 TABLET, DELAYED RELEASE ORAL at 08:49

## 2018-02-12 RX ADMIN — ASPIRIN 325 MG: 325 TABLET, DELAYED RELEASE ORAL at 08:49

## 2018-02-12 RX ADMIN — METOPROLOL SUCCINATE 100 MG: 100 TABLET, EXTENDED RELEASE ORAL at 08:49

## 2018-02-12 RX ADMIN — HEPARIN SODIUM 5000 UNITS: 5000 INJECTION, SOLUTION INTRAVENOUS; SUBCUTANEOUS at 02:32

## 2018-02-12 RX ADMIN — BENAZEPRIL HYDROCHLORIDE 5 MG: 10 TABLET ORAL at 08:50

## 2018-02-12 RX ADMIN — AMLODIPINE BESYLATE 5 MG: 5 TABLET ORAL at 08:49

## 2018-02-12 RX ADMIN — POLYETHYLENE GLYCOL 3350 17 G: 17 POWDER, FOR SOLUTION ORAL at 08:48

## 2018-02-12 RX ADMIN — HEPARIN SODIUM 5000 UNITS: 5000 INJECTION, SOLUTION INTRAVENOUS; SUBCUTANEOUS at 09:00

## 2018-02-12 NOTE — DISCHARGE SUMMARY
Hospitalist Discharge Summary     Patient ID:  Yanira Naranjo  [id number]  10 y.o.  12/4/1927  Admit date: 2/8/2018 10:56 PM  Discharge date and time: 2/12/2018  Attending: Mohan Stout MD  PCP:  Pravin Chan MD  Treatment Team: Attending Provider: Mohan Stout MD    Principal Diagnosis Acute pulmonary edema [facility])   Hospital Problems as of 2/12/2018  Date Reviewed: 9/13/2016          Codes Class Noted - Resolved POA    Diastolic CHF, acute on chronic [facility]) ICD-10-CM: I50.33  ICD-9-CM: 428.33, 428.0  2/10/2018 - Present Yes        Hyponatremia ICD-10-CM: E87.1  ICD-9-CM: 276.1  2/9/2018 - Present Yes        * (Principal)Acute pulmonary edema ([facility] ICD-10-CM: J81.0  ICD-9-CM: 518.4  2/9/2018 - Present Yes        Acute respiratory failure with hypoxia ([facility] ICD-10-CM: J96.01  ICD-9-CM: 518.81  2/9/2018 - Present Yes        DNR (do not resuscitate) (Chronic) ICD-10-CM: Z66  ICD-9-CM: V49.86  9/11/2016 - Present Yes        PAF (paroxysmal atrial fibrillation) ([facility] (Chronic) ICD-10-CM: I48.0  ICD-9-CM: 427.31  9/10/2016 - Present Yes        HTN (hypertension) (Chronic) ICD-10-CM: I10  ICD-9-CM: 401.9  8/18/2016 - Present Yes                 HPI:  'Patient is a 79 y/o F who presented with progressively worsening SOB over the last 1-2 days. She says she has had this problem before. A/w increased leg swelling. Denies history of CHF; \"my heart doctor said it's not my heart\". She said she fell 10 days ago and bruised her R lower ribs and this has been affecting her breathing. She was mildly hypoxic on room air at rest 90%. Denies orthopnea and actually prefers to lay flat. Denies recent illnesses, fevers, chills, palpitations, abd pain, urinary symptoms, diarrhea, or other symptoms NOS. CXR with pulm edema.'    Hospital Course:   1. Acute pulmonary edema secondary to acute on chronic LV diastolic CHF- patient started on IV lasix. Diuresed well (over 7 liters).   Breathing improved significantly. Creatinine remained stable. She was transitioned to PO lasix and continued to do well. She was able to be weaned off of supplemental oxygen. 2. Hyponatremia- seems to be a chronic issue. On admission, sodium 127 and remained stable despite diuresis. Apparently lasix had been stopped by her cardiologist a few weeks prior to admission, and I feel benefits of lasix outweigh risk. 3. Debility- evaluated by PT and deemed candidate for STR. Significant Diagnostic Studies:       Labs: Results:       Chemistry Recent Labs      02/12/18   0634  02/11/18   0600  02/10/18   0658   GLU  121*  99  131*   NA  127*  127*  126*   K  3.3*  4.0  3.1*   CL  87*  88*  89*   CO2  30  34*  33*   BUN  8  7*  7*   CREA  0.45*  0.41*  0.48*   CA  8.7  8.6  8.1*   AGAP  10  5*  4*      CBC w/Diff No results for input(s): WBC, RBC, HGB, HCT, PLT, GRANS, LYMPH, EOS, HGBEXT, HCTEXT, PLTEXT in the last 72 hours. Cardiac Enzymes No results for input(s): CPK, CKND1, JUNIOR in the last 72 hours. No lab exists for component: CKRMB, TROIP   Coagulation No results for input(s): PTP, INR, APTT in the last 72 hours. No lab exists for component: INREXT    Lipid Panel No results found for: CHOL, CHOLPOCT, CHOLX, CHLST, CHOLV, 467774, HDL, LDL, LDLC, DLDLP, 224457, VLDLC, VLDL, TGLX, TRIGL, TRIGP, TGLPOCT, CHHD, CHHDX   BNP No results for input(s): BNPP in the last 72 hours. Liver Enzymes No results for input(s): TP, ALB, TBIL, AP, SGOT, GPT in the last 72 hours.     No lab exists for component: DBIL   Thyroid Studies No results found for: T4, T3U, TSH, TSHEXT         Discharge Exam:  Visit Vitals    /82 (BP 1 Location: Right arm, BP Patient Position: At rest)    Pulse 98    Temp 98 °F (36.7 °C)    Resp 20    Ht 5' 1\" (1.549 m)    Wt 63.3 kg (139 lb 8 oz)    SpO2 90%    BMI 26.36 kg/m2     General appearance: alert, cooperative, no distress, elderly  Lungs: clear to auscultation bilaterally  Heart: regular rate and rhythm, S1, S2 normal, no murmur, click, rub or gallop  Abdomen: soft, non-tender. Bowel sounds normal. No masses,  no organomegaly  Extremities: no cyanosis or edema  Neurologic: Grossly normal    Disposition: SNF  Discharge Condition: stable  Patient Instructions:   Current Discharge Medication List      START taking these medications    Details   furosemide (LASIX) 20 mg tablet Take 1 Tab by mouth daily. Indications: Pulmonary Edema due to Chronic Heart Failure  Qty: 30 Tab, Refills: 0      potassium chloride (KAON 10%) 20 mEq/15 mL solution Take 15 mL by mouth daily. Indications: hypokalemia prevention  Qty: 480 mL, Refills: 0         CONTINUE these medications which have NOT CHANGED    Details   polyethylene glycol (MIRALAX) 17 gram/dose powder Take 17 g by mouth daily. 1 tablespoon with 8 oz of water daily  Qty: 238 g, Refills: 0      benazepril (LOTENSIN) 20 mg tablet Take 5 mg by mouth daily. linaclotide (LINZESS) 145 mcg cap capsule Take 145 mcg by mouth Daily (before breakfast). amLODIPine (NORVASC) 5 mg tablet Take 5 mg by mouth daily. esomeprazole (NEXIUM) 40 mg capsule Take  by mouth daily. metoprolol succinate (TOPROL-XL) 50 mg XL tablet Take 1 Tab by mouth daily. Indications: HYPERTENSION  Qty: 30 Tab, Refills: 6    Associated Diagnoses: Essential hypertension with goal blood pressure less than 140/90      aspirin delayed-release 81 mg tablet Take 325 mg by mouth daily. VIT C/YASMINE AC/LUT/COPPER/ZNOX (PRESERVISION LUTEIN PO) Take  by mouth. Activity: PT/OT Eval and Treat  Diet: Regular Diet with 3-4 gram/day sodium restriction      Follow-up      Follow-up Appointments   Procedures    FOLLOW UP VISIT Appointment in: One Week Dr. Rachel Londono (PCP) for hospital recheck. Dr. Rachel Londono (PCP) for hospital recheck. Standing Status:   Standing     Number of Occurrences:   1     Order Specific Question:   Appointment in     Answer:    One Week ·   ·   Time spent to discharge patient 33 minutes  Signed:  Raghu Gan.  Ethan Velasquez MD  2/12/2018  11:00 AM

## 2018-02-12 NOTE — PROGRESS NOTES
Per MD pt stable for d/c.  LALO spoke with pt's d-in-law and advised of d/c time. LALO spoke with Ritchie Odell at [facility] who will accept pt with a 24 hr PPD and chest x-ray. Chart copied, nsg called report, transport arranged to [facility].

## 2018-02-12 NOTE — PROGRESS NOTES
Shift Assessment - Patient has been asleep during this part of the shift. No signs of discomfort noted. Respirations are even and unlabored. Currently resting in a low, locked bed with call light within reach.

## 2018-02-12 NOTE — PROGRESS NOTES
Received call from Jori because they did not receive report. I informed them of 3 attempts to give report without success. Gave Nurse report and left number if questions.

## 2018-02-12 NOTE — PROGRESS NOTES
Problem: Self Care Deficits Care Plan (Adult)  Goal: *Acute Goals and Plan of Care (Insert Text)  1. Patient will perform grooming with supervision. 2. Patient will perform Upper body dressing with supervision  3. Patient will perform lower body dressing with supervision  4. Patient will perform upper and lower body bathing with supervision. 5. Patient will perform toilet transfers with CGA. 6. Patient will perform shower transfer with CGA. 7. Patient will participate in 30 + minutes of ADL/ therapeutic exercise/therapeutic activity with min rest breaks to increase activity tolerance for self care. 8. Patient will perform ADL functional mobility in room with CGA. Goals to be achieved in 7 days. OCCUPATIONAL THERAPY: Daily Note, Treatment Day: 2nd and AM 2/12/2018  INPATIENT: Hospital Day: 5  [place] / Product Type: Medicare /      NAME/AGE/GENDER: Jenelle Leung is a 80 y.o. female   PRIMARY DIAGNOSIS:  Acute pulmonary edema (HCC) Acute pulmonary edema (HCC) Acute pulmonary edema (HCC)        ICD-10: Treatment Diagnosis:    · Generalized Muscle Weakness (M62.81)  · Other lack of cordination (R27.8)   Precautions/Allergies:     Sulfa (sulfonamide antibiotics)      ASSESSMENT:     Ms. Mari Betancur presents supine. Pt completed functional transfer with the assistance listed below. Pt completed toileting with the assistance listed below. She would benefit from skilled OT services. Will follow. This section established at most recent assessment   PROBLEM LIST (Impairments causing functional limitations):  1. Decreased Strength  2. Decreased ADL/Functional Activities  3. Decreased Transfer Abilities  4. Decreased Ambulation Ability/Technique  5. Decreased Balance  6. Decreased Activity Tolerance   INTERVENTIONS PLANNED: (Benefits and precautions of occupational therapy have been discussed with the patient.)  1. Activities of daily living training  2.  Adaptive equipment training  3. Balance training  4. Clothing management  5. Donning&doffing training  6. Neuromuscular re-eduation  7. Therapeutic activity  8. Therapeutic exercise  9. Home safety and DME recommendations     TREATMENT PLAN: Frequency/Duration: Follow patient 4 times to address above goals. Rehabilitation Potential For Stated Goals: Good     RECOMMENDED REHABILITATION/EQUIPMENT: (at time of discharge pending progress): Due to the probability of continued deficits (see above) this patient will likely need continued skilled occupational therapy after discharge. Equipment:    None at this time              OCCUPATIONAL PROFILE AND HISTORY:   History of Present Injury/Illness (Reason for Referral):  Decreased self care and functional mobility   Past Medical History/Comorbidities:   Ms. Tom Ordaz  has a past medical history of Atrial fibrillation ([facility] CAD (coronary artery disease); Chest pain (9/13/2016); Gastrointestinal disorder (irritable bowel); and Hypertension. Ms. Tom Ordaz  has a past surgical history that includes hx cholecystectomy; hx other surgical; and hx orthopaedic. Social History/Living Environment:   Home Environment: [facility] [facility] [address] Name: [address] [address]  # Steps to Enter: 0  One/Two Story Residence: One story  Living Alone: No  Support Systems: Assisted living, Friends \ neighbors  Patient Expects to be Discharged to[de-identified] Assisted living  Current DME Used/Available at Home: [employer] Risk, straight, Walker, rolling  Tub or Shower Type: Shower  Prior Level of Function/Work/Activity:  Unknown, but most likely mod I at her MARY JANE     Number of Personal Factors/Comorbidities that affect the Plan of Care: Brief history (0):  LOW COMPLEXITY   ASSESSMENT OF OCCUPATIONAL PERFORMANCE[de-identified]   Activities of Daily Living:           Basic ADLs (From Assessment) Complex ADLs (From Assessment)   Basic ADL  Feeding:  (staff feeding her lunch)  Oral Facial Hygiene/Grooming: Minimum assistance  Bathing:  Moderate assistance  Upper Body Dressing: Minimum assistance  Lower Body Dressing: Moderate assistance  Toileting: Total assistance (catheter)     Grooming/Bathing/Dressing Activities of Daily Living    Washing hands: supervision  Cognitive Retraining  Safety/Judgement: Fall prevention                 Functional Transfers  Toilet Transfer : Minimum assistance     Bed/Mat Mobility  Supine to Sit: Supervision  Sit to Stand: Minimum assistance  Bed to Chair: Contact guard assistance       Most Recent Physical Functioning:   Gross Assessment:                  Posture:  Posture Assessment: Forward head, Rounded shoulders  Balance:  Sitting: Intact  Standing: With support Bed Mobility:  Supine to Sit: Supervision  Wheelchair Mobility:     Transfers:  Sit to Stand: Minimum assistance  Bed to Chair: Contact guard assistance     B UE ROM WFL           Patient Vitals for the past 6 hrs:   BP BP Patient Position SpO2 Pulse   18 0820 141/82 At rest 90 % 98       Mental Status  Neurologic State: Alert  Orientation Level: Oriented to person  Cognition: Follows commands  Perception: Appears intact  Perseveration: No perseveration noted  Safety/Judgement: Fall prevention                          Physical Skills Involved:  1. Balance  2. Strength  3. Activity Tolerance Cognitive Skills Affected (resulting in the inability to perform in a timely and safe manner):  1. Perception  2. Comprehension Psychosocial Skills Affected:  1. Self-Awareness   Number of elements that affect the Plan of Care: 5+:  HIGH COMPLEXITY   CLINICAL DECISION MAKIN [facility] [address] [id number] AM-PAC 6 Clicks   Daily Activity Inpatient Short Form  How much help from another person does the patient currently need. .. Total A Lot A Little None   1. Putting on and taking off regular lower body clothing? [] 1   [x] 2   [] 3   [] 4   2. Bathing (including washing, rinsing, drying)? [] 1   [x] 2   [] 3   [] 4   3.   Toileting, which includes using toilet, bedpan or urinal?   [x] 1   [] 2   [] 3   [] 4   4. Putting on and taking off regular upper body clothing? [] 1   [] 2   [x] 3   [] 4   5. Taking care of personal grooming such as brushing teeth? [] 1   [] 2   [x] 3   [] 4   6. Eating meals? [] 1   [] 2   [x] 3   [] 4   © 2007, Trustees of [address] [place] [address] [address] [id number], under license to [employer]. All rights reserved      Score:  Initial:14 Most Recent: X (Date: -- )    Interpretation of Tool:  Represents activities that are increasingly more difficult (i.e. Bed mobility, Transfers, Gait). Score 24 23 22-20 19-15 14-10 9-7 6     Modifier CH CI CJ CK CL CM CN      ? Self Care:     - CURRENT STATUS: CL - 60%-79% impaired, limited or restricted    - GOAL STATUS: CJ - 20%-39% impaired, limited or restricted    - D/C STATUS:  ---------------To be determined---------------  [place] / Product Type: Medicare /      Medical Necessity:     · Patient is expected to demonstrate progress in balance, coordination and functional technique to decrease assistance required with self care and functional mobility  and improve safety during self care and functional mobility . Reason for Services/Other Comments:  · Patient continues to require skilled intervention due to decrased self care and functional mobility . Use of outcome tool(s) and clinical judgement create a POC that gives a: LOW COMPLEXITY         TREATMENT:   (In addition to Assessment/Re-Assessment sessions the following treatments were rendered)     Pre-treatment Symptoms/Complaints:    Pain: Initial:   Pain Intensity 1: 0  Post Session:  0/10     Self Care: (10): Procedure(s) (per grid) utilized to improve and/or restore self-care/home management as related to toileting and grooming. Required min verbal cueing to facilitate activities of daily living skills. Therapeutic Activity: (    14):   Therapeutic activities including functional transfer to improve mobility and strength. Required min verbal and tactile cues   to promote motor control of bilateral, upper extremity(s), lower extremity(s). Braces/Orthotics/Lines/Etc:   · IV  · O2   ·   Treatment/Session Assessment:    · Response to Treatement, weak but cooperative  · Interdisciplinary Collaboration:   o Certified Occupational Therapy Assistant  o Registered Nurse  · After treatment position/precautions:   o Up in chair  o Bed alarm/tab alert on  o Bed/Chair-wheels locked  o Bed in low position  o Call light within reach  o RN notified   · Compliance with Program/Exercises: compliant most of the time. · Recommendations/Intent for next treatment session: \"Next visit will focus on advancements to more challenging activities and reduction in assistance provided\".   Total Treatment Duration:  OT Patient Time In/Time Out  Time In: 0915  Time Out: Simone Robles, YUDITH

## 2019-08-25 ENCOUNTER — HOSPITAL ENCOUNTER (EMERGENCY)
Age: 84
Discharge: OTHER INSTITUTION | End: 2019-08-25
Attending: EMERGENCY MEDICINE
Payer: MEDICARE

## 2019-08-25 ENCOUNTER — APPOINTMENT (OUTPATIENT)
Dept: GENERAL RADIOLOGY | Age: 84
End: 2019-08-25
Attending: EMERGENCY MEDICINE
Payer: MEDICARE

## 2019-08-25 VITALS
TEMPERATURE: 98.1 F | HEIGHT: 61 IN | OXYGEN SATURATION: 95 % | WEIGHT: 140 LBS | BODY MASS INDEX: 26.43 KG/M2 | SYSTOLIC BLOOD PRESSURE: 136 MMHG | DIASTOLIC BLOOD PRESSURE: 68 MMHG | RESPIRATION RATE: 16 BRPM | HEART RATE: 60 BPM

## 2019-08-25 DIAGNOSIS — R07.89 ATYPICAL CHEST PAIN: Primary | ICD-10-CM

## 2019-08-25 LAB
ALBUMIN SERPL-MCNC: 3.6 G/DL (ref 3.2–4.6)
ALBUMIN/GLOB SERPL: 1.1 {RATIO} (ref 1.2–3.5)
ALP SERPL-CCNC: 82 U/L (ref 50–130)
ALT SERPL-CCNC: 22 U/L (ref 12–65)
ANION GAP SERPL CALC-SCNC: 5 MMOL/L (ref 7–16)
AST SERPL-CCNC: 17 U/L (ref 15–37)
ATRIAL RATE: 57 BPM
BASOPHILS # BLD: 0 K/UL (ref 0–0.2)
BASOPHILS NFR BLD: 1 % (ref 0–2)
BILIRUB SERPL-MCNC: 0.6 MG/DL (ref 0.2–1.1)
BUN SERPL-MCNC: 12 MG/DL (ref 8–23)
CALCIUM SERPL-MCNC: 9 MG/DL (ref 8.3–10.4)
CALCULATED R AXIS, ECG10: 12 DEGREES
CALCULATED T AXIS, ECG11: 51 DEGREES
CHLORIDE SERPL-SCNC: 98 MMOL/L (ref 98–107)
CO2 SERPL-SCNC: 31 MMOL/L (ref 21–32)
CREAT SERPL-MCNC: 0.51 MG/DL (ref 0.6–1)
DIAGNOSIS, 93000: NORMAL
DIFFERENTIAL METHOD BLD: NORMAL
EOSINOPHIL # BLD: 0.1 K/UL (ref 0–0.8)
EOSINOPHIL NFR BLD: 2 % (ref 0.5–7.8)
ERYTHROCYTE [DISTWIDTH] IN BLOOD BY AUTOMATED COUNT: 12.3 % (ref 11.9–14.6)
GLOBULIN SER CALC-MCNC: 3.4 G/DL (ref 2.3–3.5)
GLUCOSE SERPL-MCNC: 157 MG/DL (ref 65–100)
HCT VFR BLD AUTO: 39.5 % (ref 35.8–46.3)
HGB BLD-MCNC: 13.4 G/DL (ref 11.7–15.4)
IMM GRANULOCYTES # BLD AUTO: 0 K/UL (ref 0–0.5)
IMM GRANULOCYTES NFR BLD AUTO: 0 % (ref 0–5)
LYMPHOCYTES # BLD: 0.8 K/UL (ref 0.5–4.6)
LYMPHOCYTES NFR BLD: 17 % (ref 13–44)
MCH RBC QN AUTO: 31.5 PG (ref 26.1–32.9)
MCHC RBC AUTO-ENTMCNC: 33.9 G/DL (ref 31.4–35)
MCV RBC AUTO: 92.7 FL (ref 79.6–97.8)
MONOCYTES # BLD: 0.5 K/UL (ref 0.1–1.3)
MONOCYTES NFR BLD: 10 % (ref 4–12)
NEUTS SEG # BLD: 3.6 K/UL (ref 1.7–8.2)
NEUTS SEG NFR BLD: 71 % (ref 43–78)
NRBC # BLD: 0 K/UL (ref 0–0.2)
PLATELET # BLD AUTO: 196 K/UL (ref 150–450)
PMV BLD AUTO: 9.5 FL (ref 9.4–12.3)
POTASSIUM SERPL-SCNC: 3.9 MMOL/L (ref 3.5–5.1)
PROT SERPL-MCNC: 7 G/DL (ref 6.3–8.2)
Q-T INTERVAL, ECG07: 438 MS
QRS DURATION, ECG06: 82 MS
QTC CALCULATION (BEZET), ECG08: 422 MS
RBC # BLD AUTO: 4.26 M/UL (ref 4.05–5.2)
SODIUM SERPL-SCNC: 134 MMOL/L (ref 136–145)
TROPONIN I BLD-MCNC: 0 NG/ML (ref 0.02–0.05)
TROPONIN I BLD-MCNC: 0.01 NG/ML (ref 0.02–0.05)
VENTRICULAR RATE, ECG03: 56 BPM
WBC # BLD AUTO: 5 K/UL (ref 4.3–11.1)

## 2019-08-25 PROCEDURE — 96374 THER/PROPH/DIAG INJ IV PUSH: CPT | Performed by: EMERGENCY MEDICINE

## 2019-08-25 PROCEDURE — 80053 COMPREHEN METABOLIC PANEL: CPT

## 2019-08-25 PROCEDURE — 71046 X-RAY EXAM CHEST 2 VIEWS: CPT

## 2019-08-25 PROCEDURE — 93005 ELECTROCARDIOGRAM TRACING: CPT | Performed by: EMERGENCY MEDICINE

## 2019-08-25 PROCEDURE — 99285 EMERGENCY DEPT VISIT HI MDM: CPT | Performed by: EMERGENCY MEDICINE

## 2019-08-25 PROCEDURE — 96376 TX/PRO/DX INJ SAME DRUG ADON: CPT | Performed by: EMERGENCY MEDICINE

## 2019-08-25 PROCEDURE — 74011250636 HC RX REV CODE- 250/636: Performed by: EMERGENCY MEDICINE

## 2019-08-25 PROCEDURE — 84484 ASSAY OF TROPONIN QUANT: CPT

## 2019-08-25 PROCEDURE — 85025 COMPLETE CBC W/AUTO DIFF WBC: CPT

## 2019-08-25 RX ORDER — MORPHINE SULFATE 2 MG/ML
1 INJECTION, SOLUTION INTRAMUSCULAR; INTRAVENOUS
Status: COMPLETED | OUTPATIENT
Start: 2019-08-25 | End: 2019-08-25

## 2019-08-25 RX ORDER — ACETAMINOPHEN 325 MG/1
650 TABLET ORAL
Status: DISCONTINUED | OUTPATIENT
Start: 2019-08-25 | End: 2019-08-25 | Stop reason: HOSPADM

## 2019-08-25 RX ADMIN — MORPHINE SULFATE 1 MG: 2 INJECTION, SOLUTION INTRAMUSCULAR; INTRAVENOUS at 13:13

## 2019-08-25 RX ADMIN — MORPHINE SULFATE 1 MG: 2 INJECTION, SOLUTION INTRAMUSCULAR; INTRAVENOUS at 11:22

## 2019-08-25 NOTE — ED PROVIDER NOTES
Left chest discomfort. This pain is worse to palpation and certain movement. Denies any fever cough or infectious changes. No recall of injury or trauma. No noted redness rash or other soft tissue changes other than soreness to touch. Right side is without complaint. There is no heavy lifting. No change from baseline activities recently. Chest Pain    This is a new problem. The problem has not changed since onset. The problem occurs every several days. The pain is associated with movement (palpation to area). The pain is mild. The pain does not radiate. Pertinent negatives include no cough, no diaphoresis, no fever, no orthopnea, no shortness of breath and no sputum production. She has tried nothing for the symptoms. Her past medical history does not include DVT or PE. Past Medical History:   Diagnosis Date    [facility] fibrillation ([facility]     CAD (coronary artery disease)     afib    Chest pain 9/13/2016    Gastrointestinal disorder irritable bowel    Hypertension        Past Surgical History:   Procedure Laterality Date    HX CHOLECYSTECTOMY      Colostomy reversal    HX ORTHOPAEDIC      left hip replacement, rt pelvic fx    HX OTHER SURGICAL      Extensive abdominal surgical history         History reviewed. No pertinent family history.     Social History     Socioeconomic History    Marital status:      Spouse name: Not on file    Number of children: Not on file    Years of education: Not on file    Highest education level: Not on file   Occupational History    Not on file   Social Needs    Financial resource strain: Not on file    Food insecurity:     Worry: Not on file     Inability: Not on file    Transportation needs:     Medical: Not on file     Non-medical: Not on file   Tobacco Use    Smoking status: Never Smoker    Smokeless tobacco: Never Used   Substance and Sexual Activity    Alcohol use: No    Drug use: No    Sexual activity: Never   Lifestyle    Physical activity:     Days per week: Not on file     Minutes per session: Not on file    Stress: Not on file   Relationships    Social connections:     Talks on phone: Not on file     Gets together: Not on file     Attends Zoroastrianism service: Not on file     Active member of club or organization: Not on file     Attends meetings of clubs or organizations: Not on file     Relationship status: Not on file    Intimate partner violence:     Fear of current or ex partner: Not on file     Emotionally abused: Not on file     Physically abused: Not on file     Forced sexual activity: Not on file   Other Topics Concern    Not on file   Social History Narrative    Not on file         ALLERGIES: Sulfa (sulfonamide antibiotics)    Review of Systems   Constitutional: Negative for chills, diaphoresis and fever. HENT: Negative. Respiratory: Negative for cough, sputum production, shortness of breath and wheezing. Cardiovascular: Positive for chest pain. Negative for orthopnea. Gastrointestinal: Negative. Genitourinary: Negative. Musculoskeletal: Negative. Neurological: Negative. Psychiatric/Behavioral: Negative. All other systems reviewed and are negative. Vitals:    08/25/19 1157 08/25/19 1227 08/25/19 1241 08/25/19 1252   BP: 169/71 (!) 172/127 (!) 215/91 (!) 172/128   Pulse: (!) 54 60 78 (!) 57   Resp:       Temp:       SpO2: 95% 96%  96%   Weight:       Height:                Physical Exam   Constitutional: She appears well-developed and well-nourished. No distress. Very alert/ age appropriate   HENT:   Head: Atraumatic. Eyes: No scleral icterus. Neck: Neck supple. Cardiovascular: Normal rate. Exam reveals no friction rub. Murmur heard. Pulmonary/Chest: Effort normal and breath sounds normal. No respiratory distress. Very tender to the point of exquisite tenderness to palpation to the left pectoral region. Visual exam shows no redness or rash swelling.    Musculoskeletal:        Right lower leg: She exhibits no tenderness and no edema. Left lower leg: She exhibits no tenderness and no edema. Neurological: She is alert. Skin: Skin is warm and dry. Psychiatric: She has a normal mood and affect. Her behavior is normal. Thought content normal.   Nursing note and vitals reviewed. MDM  Number of Diagnoses or Management Options  Atypical chest pain:   Diagnosis management comments: Reproducible significant pain to her left chest.  She does not wish narcotic-based pain medication. We will have her add Tylenol or similar. She is also to be rechecked with any significant changes/redness/rash/fever. ....        Amount and/or Complexity of Data Reviewed  Clinical lab tests: ordered and reviewed  Tests in the radiology section of CPT®: reviewed and ordered  Decide to obtain previous medical records or to obtain history from someone other than the patient: yes  Independent visualization of images, tracings, or specimens: yes    Risk of Complications, Morbidity, and/or Mortality  Presenting problems: moderate  Diagnostic procedures: low  Management options: moderate    Patient Progress  Patient progress: stable         Procedures    Recent Results (from the past 12 hour(s))   EKG, 12 LEAD, INITIAL    Collection Time: 08/25/19  9:00 AM   Result Value Ref Range    Ventricular Rate 56 BPM    Atrial Rate 57 BPM    QRS Duration 82 ms    Q-T Interval 438 ms    QTC Calculation (Bezet) 422 ms    Calculated R Axis 12 degrees    Calculated T Axis 51 degrees    Diagnosis       !! AGE AND GENDER SPECIFIC ECG ANALYSIS !!  Junctional rhythm  Possible Lateral infarct , age undetermined  Abnormal ECG  When compared with ECG of 08-FEB-2018 23:06,  Junctional rhythm has replaced Sinus rhythm  Borderline criteria for Lateral infarct are now Present     CBC WITH AUTOMATED DIFF    Collection Time: 08/25/19  9:06 AM   Result Value Ref Range    WBC 5.0 4.3 - 11.1 K/uL    RBC 4.26 4.05 - 5.2 M/uL    HGB 13.4 11.7 - 15.4 g/dL    HCT 39.5 35.8 - 46.3 %    MCV 92.7 79.6 - 97.8 FL    MCH 31.5 26.1 - 32.9 PG    MCHC 33.9 31.4 - 35.0 g/dL    RDW 12.3 11.9 - 14.6 %    PLATELET 256 663 - 454 K/uL    MPV 9.5 9.4 - 12.3 FL    ABSOLUTE NRBC 0.00 0.0 - 0.2 K/uL    DF AUTOMATED      NEUTROPHILS 71 43 - 78 %    LYMPHOCYTES 17 13 - 44 %    MONOCYTES 10 4.0 - 12.0 %    EOSINOPHILS 2 0.5 - 7.8 %    BASOPHILS 1 0.0 - 2.0 %    IMMATURE GRANULOCYTES 0 0.0 - 5.0 %    ABS. NEUTROPHILS 3.6 1.7 - 8.2 K/UL    ABS. LYMPHOCYTES 0.8 0.5 - 4.6 K/UL    ABS. MONOCYTES 0.5 0.1 - 1.3 K/UL    ABS. EOSINOPHILS 0.1 0.0 - 0.8 K/UL    ABS. BASOPHILS 0.0 0.0 - 0.2 K/UL    ABS. IMM. GRANS. 0.0 0.0 - 0.5 K/UL   METABOLIC PANEL, COMPREHENSIVE    Collection Time: 08/25/19  9:06 AM   Result Value Ref Range    Sodium 134 (L) 136 - 145 mmol/L    Potassium 3.9 3.5 - 5.1 mmol/L    Chloride 98 98 - 107 mmol/L    CO2 31 21 - 32 mmol/L    Anion gap 5 (L) 7 - 16 mmol/L    Glucose 157 (H) 65 - 100 mg/dL    BUN 12 8 - 23 MG/DL    Creatinine 0.51 (L) 0.6 - 1.0 MG/DL    GFR est AA >60 >60 ml/min/1.73m2    GFR est non-AA >60 >60 ml/min/1.73m2    Calcium 9.0 8.3 - 10.4 MG/DL    Bilirubin, total 0.6 0.2 - 1.1 MG/DL    ALT (SGPT) 22 12 - 65 U/L    AST (SGOT) 17 15 - 37 U/L    Alk.  phosphatase 82 50 - 130 U/L    Protein, total 7.0 6.3 - 8.2 g/dL    Albumin 3.6 3.2 - 4.6 g/dL    Globulin 3.4 2.3 - 3.5 g/dL    A-G Ratio 1.1 (L) 1.2 - 3.5     POC TROPONIN-I    Collection Time: 08/25/19  9:11 AM   Result Value Ref Range    Troponin-I (POC) 0.01 (L) 0.02 - 0.05 ng/ml   POC TROPONIN-I    Collection Time: 08/25/19 12:22 PM   Result Value Ref Range    Troponin-I (POC) 0 (L) 0.02 - 0.05 ng/ml

## 2019-08-25 NOTE — DISCHARGE INSTRUCTIONS
Patient Education        Tylenol for discomfort  May try lidocaine patch or similar to area  If continued problem, redness or shingle-like rash please recheck  Present work-up including repeated cardiac enzymes/markers are negative for any cardiac injury    Musculoskeletal Chest Pain: Care Instructions  Your Care Instructions    Chest pain is not always a sign that something is wrong with your heart or that you have another serious problem. The doctor thinks your chest pain is caused by strained muscles or ligaments, inflamed chest cartilage, or another problem in your chest, rather than by your heart. You may need more tests to find the cause of your chest pain. Follow-up care is a key part of your treatment and safety. Be sure to make and go to all appointments, and call your doctor if you are having problems. It's also a good idea to know your test results and keep a list of the medicines you take. How can you care for yourself at home? · Take pain medicines exactly as directed. ? If the doctor gave you a prescription medicine for pain, take it as prescribed. ? If you are not taking a prescription pain medicine, ask your doctor if you can take an over-the-counter medicine. · Rest and protect the sore area. · Stop, change, or take a break from any activity that may be causing your pain or soreness. · Put ice or a cold pack on the sore area for 10 to 20 minutes at a time. Try to do this every 1 to 2 hours for the next 3 days (when you are awake) or until the swelling goes down. Put a thin cloth between the ice and your skin. · After 2 or 3 days, apply a heating pad set on low or a warm cloth to the area that hurts. Some doctors suggest that you go back and forth between hot and cold. · Do not wrap or tape your ribs for support. This may cause you to take smaller breaths, which could increase your risk of lung problems. · Mentholated creams such as Bengay or Icy Hot may soothe sore muscles.  Follow the instructions on the package. · Follow your doctor's instructions for exercising. · Gentle stretching and massage may help you get better faster. Stretch slowly to the point just before pain begins, and hold the stretch for at least 15 to 30 seconds. Do this 3 or 4 times a day. Stretch just after you have applied heat. · As your pain gets better, slowly return to your normal activities. Any increased pain may be a sign that you need to rest a while longer. When should you call for help? Call 911 anytime you think you may need emergency care. For example, call if:    · You have chest pain or pressure. This may occur with:  ? Sweating. ? Shortness of breath. ? Nausea or vomiting. ? Pain that spreads from the chest to the neck, jaw, or one or both shoulders or arms. ? Dizziness or lightheadedness. ? A fast or uneven pulse. After calling 911, chew 1 adult-strength aspirin. Wait for an ambulance. Do not try to drive yourself.     · You have sudden chest pain and shortness of breath, or you cough up blood.    Call your doctor now or seek immediate medical care if:    · You have any trouble breathing.     · Your chest pain gets worse.     · Your chest pain occurs consistently with exercise and is relieved by rest.    Watch closely for changes in your health, and be sure to contact your doctor if:    · Your chest pain does not get better after 1 week. Where can you learn more? Go to http://loretta-ro.info/. Enter V293 in the search box to learn more about \"Musculoskeletal Chest Pain: Care Instructions. \"  Current as of: September 23, 2018  Content Version: 12.1  © 5698-7785 [employer]. Care instructions adapted under license by [employer] (which disclaims liability or warranty for this information).  If you have questions about a medical condition or this instruction, always ask your healthcare professional. [employer] disclaims any warranty or liability for your use of this information. Patient Education        Chest Pain: Care Instructions  Your Care Instructions    There are many things that can cause chest pain. Some are not serious and will get better on their own in a few days. But some kinds of chest pain need more testing and treatment. Your doctor may have recommended a follow-up visit in the next 8 to 12 hours. If you are not getting better, you may need more tests or treatment. Even though your doctor has released you, you still need to watch for any problems. The doctor carefully checked you, but sometimes problems can develop later. If you have new symptoms or if your symptoms do not get better, get medical care right away. If you have worse or different chest pain or pressure that lasts more than 5 minutes or you passed out (lost consciousness), call 911 or seek other emergency help right away. A medical visit is only one step in your treatment. Even if you feel better, you still need to do what your doctor recommends, such as going to all suggested follow-up appointments and taking medicines exactly as directed. This will help you recover and help prevent future problems. How can you care for yourself at home? · Rest until you feel better. · Take your medicine exactly as prescribed. Call your doctor if you think you are having a problem with your medicine. · Do not drive after taking a prescription pain medicine. When should you call for help? Call 911 if:    · You passed out (lost consciousness).     · You have severe difficulty breathing.     · You have symptoms of a heart attack. These may include:  ? Chest pain or pressure, or a strange feeling in your chest.  ? Sweating. ? Shortness of breath. ? Nausea or vomiting. ? Pain, pressure, or a strange feeling in your back, neck, jaw, or upper belly or in one or both shoulders or arms. ? Lightheadedness or sudden weakness. ? A fast or irregular heartbeat.   After you call 911, the  may tell you to chew 1 adult-strength or 2 to 4 low-dose aspirin. Wait for an ambulance. Do not try to drive yourself.    Call your doctor today if:    · You have any trouble breathing.     · Your chest pain gets worse.     · You are dizzy or lightheaded, or you feel like you may faint.     · You are not getting better as expected.     · You are having new or different chest pain. Where can you learn more? Go to http://loretta-ro.info/. Enter A120 in the search box to learn more about \"Chest Pain: Care Instructions. \"  Current as of: September 23, 2018  Content Version: 12.1  © 7416-1844 [employer]. Care instructions adapted under license by [employer] (which disclaims liability or warranty for this information). If you have questions about a medical condition or this instruction, always ask your healthcare professional. [address] any warranty or liability for your use of this information.

## 2019-08-25 NOTE — ED NOTES
I have reviewed discharge instructions with the patient. The patient verbalized understanding. Patient left ED via Discharge Method: wheelchair to Home with self. Opportunity for questions and clarification provided. Patient given 0 scripts. To continue your aftercare when you leave the hospital, you may receive an automated call from our care team to check in on how you are doing. This is a free service and part of our promise to provide the best care and service to meet your aftercare needs.  If you have questions, or wish to unsubscribe from this service please call [phone].   Thank you for Choosing our [facility] Emergency Department.     '

## 2019-08-25 NOTE — ED TRIAGE NOTES
Pt to ED via EMS from [facility] c/o left sided chest wall pain. Pt states pain with movement. No abnormal exercises. No fall. No cough/congestion.

## 2019-10-12 ENCOUNTER — HOSPITAL ENCOUNTER (INPATIENT)
Age: 84
LOS: 5 days | Discharge: NURSING, REHAB OR LONG-TERM CARE | DRG: 640 | End: 2019-10-17
Attending: EMERGENCY MEDICINE | Admitting: FAMILY MEDICINE
Payer: MEDICARE

## 2019-10-12 DIAGNOSIS — R41.82 ALTERED MENTAL STATUS, UNSPECIFIED ALTERED MENTAL STATUS TYPE: ICD-10-CM

## 2019-10-12 DIAGNOSIS — E87.1 HYPONATREMIA: Primary | ICD-10-CM

## 2019-10-12 DIAGNOSIS — N39.0 URINARY TRACT INFECTION WITHOUT HEMATURIA, SITE UNSPECIFIED: ICD-10-CM

## 2019-10-12 DIAGNOSIS — E87.6 HYPOKALEMIA: ICD-10-CM

## 2019-10-12 PROBLEM — G93.41 METABOLIC ENCEPHALOPATHY: Status: ACTIVE | Noted: 2019-10-12

## 2019-10-12 LAB
ALBUMIN SERPL-MCNC: 3 G/DL (ref 3.2–4.6)
ALBUMIN/GLOB SERPL: 1 {RATIO} (ref 1.2–3.5)
ALP SERPL-CCNC: 159 U/L (ref 50–130)
ALT SERPL-CCNC: 18 U/L (ref 12–65)
ANION GAP SERPL CALC-SCNC: 10 MMOL/L (ref 7–16)
ANION GAP SERPL CALC-SCNC: 8 MMOL/L (ref 7–16)
APPEARANCE UR: ABNORMAL
AST SERPL-CCNC: 28 U/L (ref 15–37)
ATRIAL RATE: 56 BPM
BACTERIA URNS QL MICRO: ABNORMAL /HPF
BASOPHILS # BLD: 0.1 K/UL (ref 0–0.2)
BASOPHILS NFR BLD: 1 % (ref 0–2)
BILIRUB SERPL-MCNC: 0.8 MG/DL (ref 0.2–1.1)
BILIRUB UR QL: NEGATIVE
BUN SERPL-MCNC: 5 MG/DL (ref 8–23)
BUN SERPL-MCNC: 5 MG/DL (ref 8–23)
CALCIUM SERPL-MCNC: 8.2 MG/DL (ref 8.3–10.4)
CALCIUM SERPL-MCNC: 8.7 MG/DL (ref 8.3–10.4)
CALCULATED R AXIS, ECG10: 22 DEGREES
CALCULATED T AXIS, ECG11: 63 DEGREES
CASTS URNS QL MICRO: ABNORMAL /LPF
CHLORIDE SERPL-SCNC: 72 MMOL/L (ref 98–107)
CHLORIDE SERPL-SCNC: 81 MMOL/L (ref 98–107)
CO2 SERPL-SCNC: 25 MMOL/L (ref 21–32)
CO2 SERPL-SCNC: 34 MMOL/L (ref 21–32)
COLOR UR: YELLOW
CORTIS AM PEAK SERPL-MCNC: 21.3 UG/DL (ref 7–25)
CREAT SERPL-MCNC: 0.24 MG/DL (ref 0.6–1)
CREAT SERPL-MCNC: 0.46 MG/DL (ref 0.6–1)
DIAGNOSIS, 93000: NORMAL
DIFFERENTIAL METHOD BLD: ABNORMAL
EOSINOPHIL # BLD: 0.1 K/UL (ref 0–0.8)
EOSINOPHIL NFR BLD: 1 % (ref 0.5–7.8)
EPI CELLS #/AREA URNS HPF: ABNORMAL /HPF
ERYTHROCYTE [DISTWIDTH] IN BLOOD BY AUTOMATED COUNT: 13.4 % (ref 11.9–14.6)
GLOBULIN SER CALC-MCNC: 3.1 G/DL (ref 2.3–3.5)
GLUCOSE SERPL-MCNC: 125 MG/DL (ref 65–100)
GLUCOSE SERPL-MCNC: 129 MG/DL (ref 65–100)
GLUCOSE UR STRIP.AUTO-MCNC: NEGATIVE MG/DL
HCT VFR BLD AUTO: 32.5 % (ref 35.8–46.3)
HGB BLD-MCNC: 11.7 G/DL (ref 11.7–15.4)
HGB UR QL STRIP: ABNORMAL
IMM GRANULOCYTES # BLD AUTO: 0.2 K/UL (ref 0–0.5)
IMM GRANULOCYTES NFR BLD AUTO: 2 % (ref 0–5)
KETONES UR QL STRIP.AUTO: NEGATIVE MG/DL
LEUKOCYTE ESTERASE UR QL STRIP.AUTO: ABNORMAL
LYMPHOCYTES # BLD: 0.7 K/UL (ref 0.5–4.6)
LYMPHOCYTES NFR BLD: 8 % (ref 13–44)
MAGNESIUM SERPL-MCNC: 1.7 MG/DL (ref 1.8–2.4)
MCH RBC QN AUTO: 31.6 PG (ref 26.1–32.9)
MCHC RBC AUTO-ENTMCNC: 36 G/DL (ref 31.4–35)
MCV RBC AUTO: 87.8 FL (ref 79.6–97.8)
MONOCYTES # BLD: 0.9 K/UL (ref 0.1–1.3)
MONOCYTES NFR BLD: 10 % (ref 4–12)
NEUTS SEG # BLD: 7.3 K/UL (ref 1.7–8.2)
NEUTS SEG NFR BLD: 79 % (ref 43–78)
NITRITE UR QL STRIP.AUTO: NEGATIVE
NRBC # BLD: 0 K/UL (ref 0–0.2)
OSMOLALITY SERPL: 232 MOSM/KG H2O
OSMOLALITY UR: 168 MOSM/KG H2O
PH UR STRIP: 7 [PH] (ref 5–9)
PLATELET # BLD AUTO: 227 K/UL (ref 150–450)
PMV BLD AUTO: 8.4 FL (ref 9.4–12.3)
POTASSIUM SERPL-SCNC: 3 MMOL/L (ref 3.5–5.1)
POTASSIUM SERPL-SCNC: 3.6 MMOL/L (ref 3.5–5.1)
PROT SERPL-MCNC: 6.1 G/DL (ref 6.3–8.2)
PROT UR STRIP-MCNC: ABNORMAL MG/DL
Q-T INTERVAL, ECG07: 434 MS
QRS DURATION, ECG06: 98 MS
QTC CALCULATION (BEZET), ECG08: 426 MS
RBC # BLD AUTO: 3.7 M/UL (ref 4.05–5.2)
RBC #/AREA URNS HPF: ABNORMAL /HPF
SODIUM SERPL-SCNC: 114 MMOL/L (ref 136–145)
SODIUM SERPL-SCNC: 116 MMOL/L (ref 136–145)
SODIUM UR-SCNC: 26 MMOL/L
SP GR UR REFRACTOMETRY: 1.01 (ref 1–1.02)
UROBILINOGEN UR QL STRIP.AUTO: 1 EU/DL (ref 0.2–1)
VENTRICULAR RATE, ECG03: 58 BPM
WBC # BLD AUTO: 9.2 K/UL (ref 4.3–11.1)
WBC URNS QL MICRO: >100 /HPF

## 2019-10-12 PROCEDURE — 83930 ASSAY OF BLOOD OSMOLALITY: CPT

## 2019-10-12 PROCEDURE — 85025 COMPLETE CBC W/AUTO DIFF WBC: CPT

## 2019-10-12 PROCEDURE — 93005 ELECTROCARDIOGRAM TRACING: CPT | Performed by: EMERGENCY MEDICINE

## 2019-10-12 PROCEDURE — 87086 URINE CULTURE/COLONY COUNT: CPT

## 2019-10-12 PROCEDURE — 77030020263 HC SOL INJ SOD CL0.9% LFCR 1000ML

## 2019-10-12 PROCEDURE — 96374 THER/PROPH/DIAG INJ IV PUSH: CPT | Performed by: EMERGENCY MEDICINE

## 2019-10-12 PROCEDURE — 74011000258 HC RX REV CODE- 258: Performed by: EMERGENCY MEDICINE

## 2019-10-12 PROCEDURE — 74011000258 HC RX REV CODE- 258: Performed by: FAMILY MEDICINE

## 2019-10-12 PROCEDURE — 83935 ASSAY OF URINE OSMOLALITY: CPT

## 2019-10-12 PROCEDURE — 74011250636 HC RX REV CODE- 250/636: Performed by: EMERGENCY MEDICINE

## 2019-10-12 PROCEDURE — 80053 COMPREHEN METABOLIC PANEL: CPT

## 2019-10-12 PROCEDURE — 84300 ASSAY OF URINE SODIUM: CPT

## 2019-10-12 PROCEDURE — 65610000001 HC ROOM ICU GENERAL

## 2019-10-12 PROCEDURE — 83735 ASSAY OF MAGNESIUM: CPT

## 2019-10-12 PROCEDURE — 87088 URINE BACTERIA CULTURE: CPT

## 2019-10-12 PROCEDURE — 74011250636 HC RX REV CODE- 250/636: Performed by: FAMILY MEDICINE

## 2019-10-12 PROCEDURE — 81001 URINALYSIS AUTO W/SCOPE: CPT

## 2019-10-12 PROCEDURE — 82533 TOTAL CORTISOL: CPT

## 2019-10-12 PROCEDURE — 87186 SC STD MICRODIL/AGAR DIL: CPT

## 2019-10-12 PROCEDURE — 99284 EMERGENCY DEPT VISIT MOD MDM: CPT | Performed by: EMERGENCY MEDICINE

## 2019-10-12 RX ORDER — ALBUTEROL SULFATE 0.83 MG/ML
2.5 SOLUTION RESPIRATORY (INHALATION)
Status: DISCONTINUED | OUTPATIENT
Start: 2019-10-12 | End: 2019-10-17 | Stop reason: HOSPADM

## 2019-10-12 RX ORDER — POLYETHYLENE GLYCOL 3350 17 G/17G
17 POWDER, FOR SOLUTION ORAL
Status: DISCONTINUED | OUTPATIENT
Start: 2019-10-12 | End: 2019-10-17 | Stop reason: HOSPADM

## 2019-10-12 RX ORDER — NALOXONE HYDROCHLORIDE 0.4 MG/ML
0.4 INJECTION, SOLUTION INTRAMUSCULAR; INTRAVENOUS; SUBCUTANEOUS AS NEEDED
Status: DISCONTINUED | OUTPATIENT
Start: 2019-10-12 | End: 2019-10-17 | Stop reason: HOSPADM

## 2019-10-12 RX ORDER — ONDANSETRON 2 MG/ML
4 INJECTION INTRAMUSCULAR; INTRAVENOUS
Status: DISCONTINUED | OUTPATIENT
Start: 2019-10-12 | End: 2019-10-17 | Stop reason: HOSPADM

## 2019-10-12 RX ORDER — POTASSIUM CHLORIDE 14.9 MG/ML
20 INJECTION INTRAVENOUS
Status: COMPLETED | OUTPATIENT
Start: 2019-10-12 | End: 2019-10-13

## 2019-10-12 RX ORDER — HYDROCODONE BITARTRATE AND ACETAMINOPHEN 5; 325 MG/1; MG/1
1 TABLET ORAL
Status: DISCONTINUED | OUTPATIENT
Start: 2019-10-12 | End: 2019-10-14

## 2019-10-12 RX ORDER — PANTOPRAZOLE SODIUM 40 MG/1
40 TABLET, DELAYED RELEASE ORAL DAILY
Status: DISCONTINUED | OUTPATIENT
Start: 2019-10-13 | End: 2019-10-15

## 2019-10-12 RX ORDER — DULOXETIN HYDROCHLORIDE 30 MG/1
30 CAPSULE, DELAYED RELEASE ORAL DAILY
Status: DISCONTINUED | OUTPATIENT
Start: 2019-10-13 | End: 2019-10-15

## 2019-10-12 RX ORDER — SODIUM CHLORIDE 9 MG/ML
100 INJECTION, SOLUTION INTRAVENOUS CONTINUOUS
Status: DISPENSED | OUTPATIENT
Start: 2019-10-12 | End: 2019-10-13

## 2019-10-12 RX ORDER — AMLODIPINE BESYLATE 5 MG/1
5 TABLET ORAL DAILY
Status: DISCONTINUED | OUTPATIENT
Start: 2019-10-13 | End: 2019-10-15

## 2019-10-12 RX ORDER — ACETAMINOPHEN 325 MG/1
650 TABLET ORAL
Status: DISCONTINUED | OUTPATIENT
Start: 2019-10-12 | End: 2019-10-17 | Stop reason: HOSPADM

## 2019-10-12 RX ORDER — GUAIFENESIN 100 MG/5ML
81 LIQUID (ML) ORAL DAILY
Status: DISCONTINUED | OUTPATIENT
Start: 2019-10-13 | End: 2019-10-17 | Stop reason: HOSPADM

## 2019-10-12 RX ORDER — METOPROLOL SUCCINATE 100 MG/1
100 TABLET, EXTENDED RELEASE ORAL DAILY
Status: DISCONTINUED | OUTPATIENT
Start: 2019-10-13 | End: 2019-10-15

## 2019-10-12 RX ORDER — MAGNESIUM SULFATE HEPTAHYDRATE 40 MG/ML
2 INJECTION, SOLUTION INTRAVENOUS ONCE
Status: COMPLETED | OUTPATIENT
Start: 2019-10-12 | End: 2019-10-13

## 2019-10-12 RX ORDER — LISINOPRIL 20 MG/1
40 TABLET ORAL DAILY
Status: DISCONTINUED | OUTPATIENT
Start: 2019-10-13 | End: 2019-10-15

## 2019-10-12 RX ORDER — HEPARIN SODIUM 5000 [USP'U]/ML
5000 INJECTION, SOLUTION INTRAVENOUS; SUBCUTANEOUS EVERY 8 HOURS
Status: DISCONTINUED | OUTPATIENT
Start: 2019-10-12 | End: 2019-10-15

## 2019-10-12 RX ADMIN — MAGNESIUM SULFATE IN WATER 2 G: 40 INJECTION, SOLUTION INTRAVENOUS at 16:14

## 2019-10-12 RX ADMIN — POTASSIUM CHLORIDE 20 MEQ: 200 INJECTION, SOLUTION INTRAVENOUS at 16:22

## 2019-10-12 RX ADMIN — HEPARIN SODIUM 5000 UNITS: 5000 INJECTION INTRAVENOUS; SUBCUTANEOUS at 23:23

## 2019-10-12 RX ADMIN — POTASSIUM CHLORIDE 20 MEQ: 200 INJECTION, SOLUTION INTRAVENOUS at 19:12

## 2019-10-12 RX ADMIN — PIPERACILLIN AND TAZOBACTAM 3.38 G: 3; .375 INJECTION, POWDER, LYOPHILIZED, FOR SOLUTION INTRAVENOUS at 16:22

## 2019-10-12 RX ADMIN — SODIUM CHLORIDE 75 ML/HR: 900 INJECTION, SOLUTION INTRAVENOUS at 23:27

## 2019-10-12 RX ADMIN — SODIUM CHLORIDE 500 ML: 900 INJECTION, SOLUTION INTRAVENOUS at 16:03

## 2019-10-12 RX ADMIN — SODIUM CHLORIDE 75 ML/HR: 900 INJECTION, SOLUTION INTRAVENOUS at 17:09

## 2019-10-12 RX ADMIN — CEFTRIAXONE 1 G: 1 INJECTION, POWDER, FOR SOLUTION INTRAMUSCULAR; INTRAVENOUS at 14:43

## 2019-10-12 RX ADMIN — PIPERACILLIN AND TAZOBACTAM 3.38 G: 3; .375 INJECTION, POWDER, LYOPHILIZED, FOR SOLUTION INTRAVENOUS at 23:23

## 2019-10-12 RX ADMIN — HEPARIN SODIUM 5000 UNITS: 5000 INJECTION INTRAVENOUS; SUBCUTANEOUS at 16:07

## 2019-10-12 RX ADMIN — POTASSIUM CHLORIDE 20 MEQ: 200 INJECTION, SOLUTION INTRAVENOUS at 17:11

## 2019-10-12 NOTE — ED NOTES
Straight cath performed with urine sent to lab for urinalysis, strong odor present. Brief changed at this time, chip paddings placed under patient. Warm blanket provided.

## 2019-10-12 NOTE — ED NOTES
Patient brought from [facility] following decreased mental status and labs drawn this morning with potassium of 2.6 and sodium of 115. Patient is slow to respond verbally however is answering questions correctly. Patient is normally up and ambulating with walker and very talkative.

## 2019-10-12 NOTE — ED NOTES
TRANSFER - OUT REPORT:    Verbal report given to Josafat Chen RN on Dona Parrish  being transferred to [address] [place] [address] for routine progression of care       Report consisted of patients Situation, Background, Assessment and   Recommendations(SBAR). Information from the following report(s) ED Summary was reviewed with the receiving nurse. Lines:   Peripheral IV 10/12/19 Right Antecubital (Active)        Opportunity for questions and clarification was provided.       Patient transported with:   Monitor  Registered Nurse

## 2019-10-12 NOTE — H&P
Hospitalist Admission History and Physical     NAME:  Merrill Chisholm   Age:  80 y.o.  :   1927   MRN:   [id number]  PCP: Kavin Lamb MD  Consulting MD:  Treatment Team: Attending Provider: Corine Montalvo MD; Primary Nurse: Mikhail Horowitz RN    Chief Complaint   Patient presents with   [address] [facility] [address] Altered mental status    Abnormal Lab Results         HPI:   Patient is a 80 y.o. female who presented to the ED for cc altered mental status. Hx of hypokalemia, HTN, GERD, diastolic CHF EF 12-95%. No family in the room and patient is altered when I walk into the room. Patient does admit to not eating well for the past week. Otherwise, will not answer my questions. [facility]    Labs- WBC 9.2. UA consistent with UTI, Na 116. K 3. Mg 1.7. Influenza negative. Past Medical History:   Diagnosis Date    Atrial fibrillation ([facility] [address]     CAD (coronary artery disease)     afib    Chest pain 2016    Gastrointestinal disorder irritable bowel    Hypertension         Past Surgical History:   Procedure Laterality Date    HX CHOLECYSTECTOMY      Colostomy reversal    HX ORTHOPAEDIC      left hip replacement, rt pelvic fx    HX OTHER SURGICAL      Extensive abdominal surgical history        No family history on file. Social History     Social History Narrative    Not on file        Social History     Tobacco Use    Smoking status: Never Smoker    Smokeless tobacco: Never Used   Substance Use Topics    Alcohol use: No        Social History     Substance and Sexual Activity   Drug Use No         Allergies   Allergen Reactions    Sulfa (Sulfonamide Antibiotics) Not Reported This Time     Pt denies allergy       Prior to Admission medications    Medication Sig Start Date End Date Taking? Authorizing Provider   furosemide (LASIX) 20 mg tablet Take 1 Tab by mouth daily.  Indications: Pulmonary Edema due to Chronic Heart Failure 18   Samantha Finley MD potassium chloride (KAON 10%) 20 mEq/15 mL solution Take 15 mL by mouth daily. Indications: hypokalemia prevention 2/13/18   Melody Jaquez MD   polyethylene glycol [facility]) 17 gram/dose powder Take 17 g by mouth daily. 1 tablespoon with 8 oz of water daily 9/14/17   Mary Mayo MD   benazepril (LOTENSIN) 20 mg tablet Take 5 mg by mouth daily. Evelina Concepcion MD   linaclotide (LINZESS) 145 mcg cap capsule Take 145 mcg by mouth Daily (before breakfast). Evelina Concepcion MD   amLODIPine (NORVASC) 5 mg tablet Take 5 mg by mouth daily. Provider, Historical   esomeprazole (NEXIUM) 40 mg capsule Take  by mouth daily. Provider, Historical   metoprolol succinate (TOPROL-XL) 50 mg XL tablet Take 1 Tab by mouth daily. Indications: HYPERTENSION  Patient taking differently: Take 100 mg by mouth daily. Indications: Hypertension 9/29/16   Tracee Taylor MD   aspirin delayed-release 81 mg tablet Take 325 mg by mouth daily. Evelina Concepcion MD   VIT C/YASMINE AC/LUT/COPPER/ZNOX (PRESERVISION LUTEIN PO) Take  by mouth. Evelina Concepcion MD           Review of Systems    Cannot obtain from patient       Objective:     Visit Vitals  /72 (BP 1 Location: Left arm, BP Patient Position: At rest)   Pulse 61   Temp 98.4 °F (36.9 °C)   Resp 27   Ht 5' 1\" (1.549 m)   Wt 63.5 kg (140 lb)   SpO2 94%   BMI 26.45 kg/m²        No intake/output data recorded. No intake/output data recorded.     Data Review:   Recent Results (from the past 24 hour(s))   CBC WITH AUTOMATED DIFF    Collection Time: 10/12/19 12:53 PM   Result Value Ref Range    WBC 9.2 4.3 - 11.1 K/uL    RBC 3.70 (L) 4.05 - 5.2 M/uL    HGB 11.7 11.7 - 15.4 g/dL    HCT 32.5 (L) 35.8 - 46.3 %    MCV 87.8 79.6 - 97.8 FL    MCH 31.6 26.1 - 32.9 PG    MCHC 36.0 (H) 31.4 - 35.0 g/dL    RDW 13.4 11.9 - 14.6 %    PLATELET 894 601 - 145 K/uL    MPV 8.4 (L) 9.4 - 12.3 FL    ABSOLUTE NRBC 0.00 0.0 - 0.2 K/uL    DF AUTOMATED      NEUTROPHILS 79 (H) 43 - 78 %    LYMPHOCYTES 8 (L) 13 - 44 %    MONOCYTES 10 4.0 - 12.0 %    EOSINOPHILS 1 0.5 - 7.8 %    BASOPHILS 1 0.0 - 2.0 %    IMMATURE GRANULOCYTES 2 0.0 - 5.0 %    ABS. NEUTROPHILS 7.3 1.7 - 8.2 K/UL    ABS. LYMPHOCYTES 0.7 0.5 - 4.6 K/UL    ABS. MONOCYTES 0.9 0.1 - 1.3 K/UL    ABS. EOSINOPHILS 0.1 0.0 - 0.8 K/UL    ABS. BASOPHILS 0.1 0.0 - 0.2 K/UL    ABS. IMM. GRANS. 0.2 0.0 - 0.5 K/UL   METABOLIC PANEL, COMPREHENSIVE    Collection Time: 10/12/19 12:53 PM   Result Value Ref Range    Sodium 116 (LL) 136 - 145 mmol/L    Potassium 3.0 (L) 3.5 - 5.1 mmol/L    Chloride 72 (L) 98 - 107 mmol/L    CO2 34 (H) 21 - 32 mmol/L    Anion gap 10 7 - 16 mmol/L    Glucose 129 (H) 65 - 100 mg/dL    BUN 5 (L) 8 - 23 MG/DL    Creatinine 0.46 (L) 0.6 - 1.0 MG/DL    GFR est AA >60 >60 ml/min/1.73m2    GFR est non-AA >60 >60 ml/min/1.73m2    Calcium 8.7 8.3 - 10.4 MG/DL    Bilirubin, total 0.8 0.2 - 1.1 MG/DL    ALT (SGPT) 18 12 - 65 U/L    AST (SGOT) 28 15 - 37 U/L    Alk. phosphatase 159 (H) 50 - 130 U/L    Protein, total 6.1 (L) 6.3 - 8.2 g/dL    Albumin 3.0 (L) 3.2 - 4.6 g/dL    Globulin 3.1 2.3 - 3.5 g/dL    A-G Ratio 1.0 (L) 1.2 - 3.5     MAGNESIUM    Collection Time: 10/12/19 12:53 PM   Result Value Ref Range    Magnesium 1.7 (L) 1.8 - 2.4 mg/dL   EKG, 12 LEAD, INITIAL    Collection Time: 10/12/19 12:59 PM   Result Value Ref Range    Ventricular Rate 58 BPM    Atrial Rate 56 BPM    QRS Duration 98 ms    Q-T Interval 434 ms    QTC Calculation (Bezet) 426 ms    Calculated R Axis 22 degrees    Calculated T Axis 63 degrees    Diagnosis       !! AGE AND GENDER SPECIFIC ECG ANALYSIS !!   Atrial fibrillation with slow ventricular response  Abnormal ECG  When compared with ECG of 25-AUG-2019 09:00,  Atrial fibrillation has replaced Sinus rhythm  Borderline criteria for Lateral infarct are no longer Present     URINALYSIS W/ RFLX MICROSCOPIC    Collection Time: 10/12/19  1:35 PM   Result Value Ref Range    Color YELLOW      Appearance TURBID      Specific gravity 1.007 1.001 - 1.023      pH (UA) 7.0 5.0 - 9.0      Protein TRACE (A) NEG mg/dL    Glucose NEGATIVE  mg/dL    Ketone NEGATIVE  NEG mg/dL    Bilirubin NEGATIVE  NEG      Blood SMALL (A) NEG      Urobilinogen 1.0 0.2 - 1.0 EU/dL    Nitrites NEGATIVE  NEG      Leukocyte Esterase LARGE (A) NEG      WBC >100 (H) 0 /hpf    RBC 3-5 0 /hpf    Epithelial cells 0-3 0 /hpf    Bacteria 4+ (H) 0 /hpf    Casts 0-3 0 /lpf       Physical Exam:     General:  Alert after verbal stimulation. Difficulty with speech and continues to fall asleep. Frail appearing   Eyes:  Conjunctivae/corneas clear. Ears:  Normal TMs and external ear canals both ears. Nose: Nares normal. Septum midline. Mouth/Throat: Dry oral mucosa and poor dentition    Neck:  no JVD. Back:   deferred   Lungs:   Expiratory rhales   Heart:  Regular rate and rhythm, S1, S2 normal   Abdomen:   Soft, non-tender. Bowel sounds normal. No masses,  No organomegaly. Extremities: Extremities normal, atraumatic, no cyanosis or edema. Dry skin    Pulses: 2+ and symmetric all extremities. Skin: No obvious lesions    Lymph nodes: Cervical, supraclavicular, and axillary nodes normal.   Neurologic: Altered, confused     Assessment and Plan     Principal Problem:    Metabolic encephalopathy (19/68/0497)    Active Problems:    Hyponatremia (2/9/2018)      HTN (hypertension) (8/18/2016)      DNR (do not resuscitate) (9/11/2016)      UTI (urinary tract infection) (68/12/7803)    Metabolic encephalopathy secondary to hyponatremia and UTI. Keep NPO until more alert. Hyponatremia - Decrease dose of Cymbalta since can cause SIADH in the elderly but want to avoid withdrawal. Hold Bumetanide and lasix. Suspecting hyponatremia from dehydration. Give gentle hydration and monitor BMP q 6 hr. Avoid overcorrection no more than 8 units in 24 hours to avoid demyelination. UTI - urine cultures in the past have grown ESBL sensitive to zosyn. Will start Zosyn. Order urine culture. Hypokalemia - Supplement    Hypomagnesemia - Supplement. Place as bedrest.     ECHO EF 60-65%, no wall motion abnormality and mild diastolic dysfunction grade 1 in 2018. Mild aortic stenosis seen. Monitor for signs of volume overload since holding diuretics and giving gentle hydration.      DVT prophylaxis - heparin    Signed By: Arturo Garibay, DO   October 12, 2019

## 2019-10-12 NOTE — PROGRESS NOTES
TRANSFER - IN REPORT:    Verbal report received from [facility](name) on Arabella Ugarte  being received from ED(unit) for change in patient condition(requiring ICU monitoring)      Report consisted of patients Situation, Background, Assessment and   Recommendations(SBAR). Information from the following report(s) ED Summary, Intake/Output, MAR and Cardiac Rhythm SR was reviewed with the receiving nurse. Opportunity for questions and clarification was provided. Assessment completed upon patients arrival to unit and care assumed.

## 2019-10-12 NOTE — PROGRESS NOTES
Bed alarm in place, bed low and locked. Call light within reach. Pt denies pain or any needs at this time.

## 2019-10-12 NOTE — PROGRESS NOTES
Received pt to unit from ED. Pt drowsy but eyes open spontaneously. Oriented x4. Lung sounds clear; 02 sats high 90's on room air. Abdomen soft and intact with active bowel sounds. Pulses palpable and present all extremities. No edema. Pt voids incontinently in briefs. Pt bathed, hygiene performed, new brief placed on pt. Dual skin assessment completed with Precious Schaeffer RN, pt has excoriation to groin. Pt denies pain. VSS. Will continue to monitor.

## 2019-10-12 NOTE — ED PROVIDER NOTES
Pina Zavala is a 80 y.o. female seen on 10/12/2019 at 12:38 PM in the [facility] EMERGENCY DEPT in room FT11/11. Chief Complaint   Patient presents with    Altered mental status    Abnormal Lab Results     HPI: 19-year-old female brought into the emergency department by EMS from a nursing facility for decreased level of alertness and abnormal labs. Reportedly her sodium is 115 and her potassium was low as well. The paramedic who transported the patient is actually transported her before in the past and does also confirm that she seems significantly different from her baseline level of alertness. The patient is somnolent and unable to provide any significant amount of history. Historian: EMS    REVIEW OF SYSTEMS     Review of Systems   Unable to perform ROS: Mental status change       PAST MEDICAL HISTORY     Past Medical History:   Diagnosis Date    Atrial fibrillation ([address]     CAD (coronary artery disease)     afib    Chest pain 9/13/2016    Gastrointestinal disorder irritable bowel    Hypertension      Past Surgical History:   Procedure Laterality Date    HX CHOLECYSTECTOMY      Colostomy reversal    HX ORTHOPAEDIC      left hip replacement, rt pelvic fx    HX OTHER SURGICAL      Extensive abdominal surgical history     Social History     Socioeconomic History    Marital status:      Spouse name: Not on file    Number of children: Not on file    Years of education: Not on file    Highest education level: Not on file   Tobacco Use    Smoking status: Never Smoker    Smokeless tobacco: Never Used   Substance and Sexual Activity    Alcohol use: No    Drug use: No    Sexual activity: Never     Prior to Admission Medications   Prescriptions Last Dose Informant Patient Reported? Taking? VIT C/YASMINE AC/LUT/COPPER/ZNOX (PRESERVISION LUTEIN PO)   Yes No   Sig: Take  by mouth.    amLODIPine (NORVASC) 5 mg tablet   Yes No   Sig: Take 5 mg by mouth daily. aspirin delayed-release 81 mg tablet   Yes No   Sig: Take 325 mg by mouth daily. benazepril (LOTENSIN) 20 mg tablet   Yes No   Sig: Take 5 mg by mouth daily. esomeprazole (NEXIUM) 40 mg capsule   Yes No   Sig: Take  by mouth daily. furosemide (LASIX) 20 mg tablet   No No   Sig: Take 1 Tab by mouth daily. Indications: Pulmonary Edema due to Chronic Heart Failure   linaclotide (LINZESS) 145 mcg cap capsule   Yes No   Sig: Take 145 mcg by mouth Daily (before breakfast). metoprolol succinate (TOPROL-XL) 50 mg XL tablet   No No   Sig: Take 1 Tab by mouth daily. Indications: HYPERTENSION   Patient taking differently: Take 100 mg by mouth daily. Indications: Hypertension   polyethylene glycol (MIRALAX) 17 gram/dose powder   No No   Sig: Take 17 g by mouth daily. 1 tablespoon with 8 oz of water daily   potassium chloride (KAON 10%) 20 mEq/15 mL solution   No No   Sig: Take 15 mL by mouth daily. Indications: hypokalemia prevention      Facility-Administered Medications: None     Allergies   Allergen Reactions    Sulfa (Sulfonamide Antibiotics) Not Reported This Time     Pt denies allergy        PHYSICAL EXAM       Vitals:    10/12/19 1219   BP: 147/65   Pulse: (!) 57   Resp: 16   Temp: 98.2 °F (36.8 °C)   SpO2: 98%    Vital signs were reviewed.      Physical Exam     MEDICAL DECISION MAKING     MDM  Number of Diagnoses or Management Options  Altered mental status, unspecified altered mental status type:   Hypokalemia:   Hyponatremia:   Urinary tract infection without hematuria, site unspecified:      Amount and/or Complexity of Data Reviewed  Clinical lab tests: ordered and reviewed  Review and summarize past medical records: yes    Risk of Complications, Morbidity, and/or Mortality  Presenting problems: high  Diagnostic procedures: high  Management options: high      Procedures    ED Course: Family called to the hospital and actually confirmed that the patient had some change in mental status over the last 5 to 6 days and they had requested blood work to be performed at the nursing facility. Lab tests confirm hyponatremia as well as hypokalemia. In addition the patient does appear to have a urinary tract infection. She is received 1 g IV Rocephin. I discussed case with the hospitalist will admit for further treatment at this time. Disposition: Admission to the hospital  Diagnosis: Acute urinary tract infection, hypokalemia, hyponatremia, altered mental status  ____________________________________________________________________  A portion of this note was generated using voice recognition dictation software. While the note has been reviewed for accuracy, please note certain words and phrases may not be transcribed as intended and some grammatical and/or typographical errors may be present.

## 2019-10-13 LAB
ANION GAP SERPL CALC-SCNC: 1 MMOL/L (ref 7–16)
ANION GAP SERPL CALC-SCNC: 4 MMOL/L (ref 7–16)
ANION GAP SERPL CALC-SCNC: 5 MMOL/L (ref 7–16)
ANION GAP SERPL CALC-SCNC: 7 MMOL/L (ref 7–16)
BUN SERPL-MCNC: 3 MG/DL (ref 8–23)
BUN SERPL-MCNC: 4 MG/DL (ref 8–23)
CALCIUM SERPL-MCNC: 7.9 MG/DL (ref 8.3–10.4)
CALCIUM SERPL-MCNC: 8 MG/DL (ref 8.3–10.4)
CALCIUM SERPL-MCNC: 8.3 MG/DL (ref 8.3–10.4)
CALCIUM SERPL-MCNC: 8.6 MG/DL (ref 8.3–10.4)
CHLORIDE SERPL-SCNC: 83 MMOL/L (ref 98–107)
CHLORIDE SERPL-SCNC: 85 MMOL/L (ref 98–107)
CHLORIDE SERPL-SCNC: 91 MMOL/L (ref 98–107)
CHLORIDE SERPL-SCNC: 92 MMOL/L (ref 98–107)
CO2 SERPL-SCNC: 24 MMOL/L (ref 21–32)
CO2 SERPL-SCNC: 25 MMOL/L (ref 21–32)
CO2 SERPL-SCNC: 28 MMOL/L (ref 21–32)
CO2 SERPL-SCNC: 29 MMOL/L (ref 21–32)
CREAT SERPL-MCNC: 0.22 MG/DL (ref 0.6–1)
CREAT SERPL-MCNC: 0.23 MG/DL (ref 0.6–1)
CREAT SERPL-MCNC: 0.27 MG/DL (ref 0.6–1)
CREAT SERPL-MCNC: 0.27 MG/DL (ref 0.6–1)
ERYTHROCYTE [DISTWIDTH] IN BLOOD BY AUTOMATED COUNT: 13.8 % (ref 11.9–14.6)
GLUCOSE SERPL-MCNC: 125 MG/DL (ref 65–100)
GLUCOSE SERPL-MCNC: 160 MG/DL (ref 65–100)
GLUCOSE SERPL-MCNC: 195 MG/DL (ref 65–100)
GLUCOSE SERPL-MCNC: 93 MG/DL (ref 65–100)
HCT VFR BLD AUTO: 34.6 % (ref 35.8–46.3)
HGB BLD-MCNC: 11.8 G/DL (ref 11.7–15.4)
MAGNESIUM SERPL-MCNC: 2 MG/DL (ref 1.8–2.4)
MCH RBC QN AUTO: 30.9 PG (ref 26.1–32.9)
MCHC RBC AUTO-ENTMCNC: 34.1 G/DL (ref 31.4–35)
MCV RBC AUTO: 90.6 FL (ref 79.6–97.8)
NRBC # BLD: 0 K/UL (ref 0–0.2)
PLATELET # BLD AUTO: 209 K/UL (ref 150–450)
PMV BLD AUTO: 9.3 FL (ref 9.4–12.3)
POTASSIUM SERPL-SCNC: 3.4 MMOL/L (ref 3.5–5.1)
POTASSIUM SERPL-SCNC: 3.7 MMOL/L (ref 3.5–5.1)
RBC # BLD AUTO: 3.82 M/UL (ref 4.05–5.2)
SODIUM SERPL-SCNC: 115 MMOL/L (ref 136–145)
SODIUM SERPL-SCNC: 118 MMOL/L (ref 136–145)
SODIUM SERPL-SCNC: 120 MMOL/L (ref 136–145)
SODIUM SERPL-SCNC: 121 MMOL/L (ref 136–145)
SODIUM UR-SCNC: 81 MMOL/L
WBC # BLD AUTO: 9.3 K/UL (ref 4.3–11.1)

## 2019-10-13 PROCEDURE — 36415 COLL VENOUS BLD VENIPUNCTURE: CPT

## 2019-10-13 PROCEDURE — 80048 BASIC METABOLIC PNL TOTAL CA: CPT

## 2019-10-13 PROCEDURE — 84300 ASSAY OF URINE SODIUM: CPT

## 2019-10-13 PROCEDURE — 85027 COMPLETE CBC AUTOMATED: CPT

## 2019-10-13 PROCEDURE — 77030020263 HC SOL INJ SOD CL0.9% LFCR 1000ML

## 2019-10-13 PROCEDURE — 83735 ASSAY OF MAGNESIUM: CPT

## 2019-10-13 PROCEDURE — 65610000001 HC ROOM ICU GENERAL

## 2019-10-13 PROCEDURE — 74011000258 HC RX REV CODE- 258: Performed by: FAMILY MEDICINE

## 2019-10-13 PROCEDURE — 74011250636 HC RX REV CODE- 250/636: Performed by: FAMILY MEDICINE

## 2019-10-13 PROCEDURE — 74011250637 HC RX REV CODE- 250/637: Performed by: FAMILY MEDICINE

## 2019-10-13 RX ORDER — POTASSIUM CHLORIDE 14.9 MG/ML
20 INJECTION INTRAVENOUS
Status: COMPLETED | OUTPATIENT
Start: 2019-10-13 | End: 2019-10-13

## 2019-10-13 RX ORDER — DEXTROSE MONOHYDRATE AND SODIUM CHLORIDE 5; .45 G/100ML; G/100ML
50 INJECTION, SOLUTION INTRAVENOUS CONTINUOUS
Status: DISCONTINUED | OUTPATIENT
Start: 2019-10-13 | End: 2019-10-14

## 2019-10-13 RX ADMIN — HYDROCODONE BITARTRATE AND ACETAMINOPHEN 1 TABLET: 5; 325 TABLET ORAL at 20:54

## 2019-10-13 RX ADMIN — HYDROCODONE BITARTRATE AND ACETAMINOPHEN 1 TABLET: 5; 325 TABLET ORAL at 15:47

## 2019-10-13 RX ADMIN — LISINOPRIL 40 MG: 20 TABLET ORAL at 09:51

## 2019-10-13 RX ADMIN — HEPARIN SODIUM 5000 UNITS: 5000 INJECTION INTRAVENOUS; SUBCUTANEOUS at 15:48

## 2019-10-13 RX ADMIN — METOPROLOL SUCCINATE 100 MG: 100 TABLET, EXTENDED RELEASE ORAL at 09:55

## 2019-10-13 RX ADMIN — PIPERACILLIN AND TAZOBACTAM 3.38 G: 3; .375 INJECTION, POWDER, LYOPHILIZED, FOR SOLUTION INTRAVENOUS at 07:18

## 2019-10-13 RX ADMIN — PANTOPRAZOLE SODIUM 40 MG: 40 TABLET, DELAYED RELEASE ORAL at 09:55

## 2019-10-13 RX ADMIN — DEXTROSE MONOHYDRATE AND SODIUM CHLORIDE 100 ML/HR: 5; .45 INJECTION, SOLUTION INTRAVENOUS at 16:02

## 2019-10-13 RX ADMIN — PIPERACILLIN AND TAZOBACTAM 3.38 G: 3; .375 INJECTION, POWDER, LYOPHILIZED, FOR SOLUTION INTRAVENOUS at 20:28

## 2019-10-13 RX ADMIN — POTASSIUM CHLORIDE 20 MEQ: 200 INJECTION, SOLUTION INTRAVENOUS at 12:58

## 2019-10-13 RX ADMIN — DULOXETINE HYDROCHLORIDE 30 MG: 30 CAPSULE, DELAYED RELEASE ORAL at 09:54

## 2019-10-13 RX ADMIN — POTASSIUM CHLORIDE 20 MEQ: 200 INJECTION, SOLUTION INTRAVENOUS at 09:58

## 2019-10-13 RX ADMIN — HYDROCODONE BITARTRATE AND ACETAMINOPHEN 1 TABLET: 5; 325 TABLET ORAL at 11:11

## 2019-10-13 RX ADMIN — HEPARIN SODIUM 5000 UNITS: 5000 INJECTION INTRAVENOUS; SUBCUTANEOUS at 07:15

## 2019-10-13 RX ADMIN — AMLODIPINE BESYLATE 5 MG: 5 TABLET ORAL at 09:56

## 2019-10-13 RX ADMIN — SODIUM CHLORIDE 1000 ML: 900 INJECTION, SOLUTION INTRAVENOUS at 09:00

## 2019-10-13 RX ADMIN — ASPIRIN 81 MG 81 MG: 81 TABLET ORAL at 09:56

## 2019-10-13 RX ADMIN — DEXTROSE MONOHYDRATE AND SODIUM CHLORIDE 50 ML/HR: 5; .45 INJECTION, SOLUTION INTRAVENOUS at 20:28

## 2019-10-13 RX ADMIN — POTASSIUM CHLORIDE 20 MEQ: 200 INJECTION, SOLUTION INTRAVENOUS at 07:44

## 2019-10-13 RX ADMIN — HEPARIN SODIUM 5000 UNITS: 5000 INJECTION INTRAVENOUS; SUBCUTANEOUS at 23:00

## 2019-10-13 NOTE — PROGRESS NOTES
Progress Note    Patient: Jakub Dougherty MRN: [id number]  SSN: [id number]    YOB: 1927  Age: 80 y.o. Sex: female      Admit Date: 10/12/2019    LOS: 1 day     Subjective:   F/U hyponatremia, UTI    80 y.o. female who presented to the ED for cc altered mental status. Hx of hypokalemia, HTN, GERD, diastolic CHF EF 60-84%. Patient does admit to not eating well for the past week. Labs- WBC 9.2. UA consistent with UTI, Na 116. K 3. Mg 1.7. Influenza negative on day of admission. Patient admitted to ICU with gentle hydration along with starting Zosyn since urine cultures in the past have grown ESBL sensitive to Zosyn. Urine culture growing gram negative rods. Na improving to 118 today. Urine sodium 26, urine osmolality 168, AM cortisol 21.3. Serum osmolality 232.      Patient is from [place]      Today, patient is more alert.    Current Facility-Administered Medications   Medication Dose Route Frequency    potassium chloride 20 mEq in 100 ml IVPB  20 mEq IntraVENous Q2H    acetaminophen (TYLENOL) tablet 650 mg  650 mg Oral Q4H PRN    HYDROcodone-acetaminophen (NORCO) 5-325 mg per tablet 1 Tab  1 Tab Oral Q4H PRN    naloxone (NARCAN) injection 0.4 mg  0.4 mg IntraVENous PRN    ondansetron (ZOFRAN) injection 4 mg  4 mg IntraVENous Q4H PRN    heparin (porcine) injection 5,000 Units  5,000 Units SubCUTAneous Q8H    polyethylene glycol (MIRALAX) packet 17 g  17 g Oral DAILY PRN    DULoxetine (CYMBALTA) capsule 30 mg  30 mg Oral DAILY    aspirin chewable tablet 81 mg  81 mg Oral DAILY    amLODIPine (NORVASC) tablet 5 mg  5 mg Oral DAILY    lisinopril (PRINIVIL, ZESTRIL) tablet 40 mg  40 mg Oral DAILY    pantoprazole (PROTONIX) tablet 40 mg  40 mg Oral DAILY    metoprolol succinate (TOPROL-XL) XL tablet 100 mg  100 mg Oral DAILY    piperacillin-tazobactam (ZOSYN) 3.375 g in 0.9% sodium chloride (MBP/ADV) 100 mL  3.375 g IntraVENous Q8H    albuterol (PROVENTIL VENTOLIN) nebulizer solution 2.5 mg  2.5 mg Nebulization Q6H PRN    0.9% sodium chloride infusion  75 mL/hr IntraVENous CONTINUOUS       Objective:     Vitals:    10/13/19 0500 10/13/19 0530 10/13/19 0600 10/13/19 0723   BP: 139/67 158/68 150/78 121/58   Pulse: 75 78 86 96   Resp: 18 17 27 25   Temp:   98 °F (36.7 °C) 99.5 °F (37.5 °C)   SpO2: 96% 97% 99% 95%   Weight:       Height:             Intake and Output:  Current Shift: No intake/output data recorded. Last three shifts: 10/11 1901 - 10/13 0700  In: 1095.2 [I.V.:1095.2]  Out: -     Physical Exam:   General:  Alert, cooperative, no distress, frail appearing. Talking more today. Eyes:  Conjunctivae/corneas clear. Ears:  Normal TMs and external ear canals both ears. Nose: Nares normal. Septum midline. Mouth/Throat: Dry oral mucosa. Dry cracking lips. Neck:  no JVD. Back:   deferred   Lungs:   Clear to auscultation bilaterally. Heart:  Regular rate and rhythm, S1, S2 normal   Abdomen:   Soft, non-tender. Bowel sounds normal.    Extremities: Extremities normal, atraumatic, no cyanosis or edema. Pulses: 2+ and symmetric all extremities. Skin: Skin color, texture, turgor normal. No rashes or lesions   Lymph nodes: Cervical, supraclavicular, and axillary nodes normal.   Neurologic: Talking more today. Limited movements in bed.         Lab/Data Review:    Recent Results (from the past 24 hour(s))   CORTISOL, AM    Collection Time: 10/12/19 12:23 PM   Result Value Ref Range    Cortisol, a.m. 21.3 7 - 25 ug/dL   OSMOLALITY, SERUM/PLASMA    Collection Time: 10/12/19 12:23 PM   Result Value Ref Range    Osmolality, serum/plasma 232 MOSM/kg H2O   CBC WITH AUTOMATED DIFF    Collection Time: 10/12/19 12:53 PM   Result Value Ref Range    WBC 9.2 4.3 - 11.1 K/uL    RBC 3.70 (L) 4.05 - 5.2 M/uL    HGB 11.7 11.7 - 15.4 g/dL    HCT 32.5 (L) 35.8 - 46.3 %    MCV 87.8 79.6 - 97.8 FL    MCH 31.6 26.1 - 32.9 PG    MCHC 36.0 (H) 31.4 - 35.0 g/dL    RDW 13.4 11.9 - 14.6 %    PLATELET 822 668 - 450 K/uL    MPV 8.4 (L) 9.4 - 12.3 FL    ABSOLUTE NRBC 0.00 0.0 - 0.2 K/uL    DF AUTOMATED      NEUTROPHILS 79 (H) 43 - 78 %    LYMPHOCYTES 8 (L) 13 - 44 %    MONOCYTES 10 4.0 - 12.0 %    EOSINOPHILS 1 0.5 - 7.8 %    BASOPHILS 1 0.0 - 2.0 %    IMMATURE GRANULOCYTES 2 0.0 - 5.0 %    ABS. NEUTROPHILS 7.3 1.7 - 8.2 K/UL    ABS. LYMPHOCYTES 0.7 0.5 - 4.6 K/UL    ABS. MONOCYTES 0.9 0.1 - 1.3 K/UL    ABS. EOSINOPHILS 0.1 0.0 - 0.8 K/UL    ABS. BASOPHILS 0.1 0.0 - 0.2 K/UL    ABS. IMM. GRANS. 0.2 0.0 - 0.5 K/UL   METABOLIC PANEL, COMPREHENSIVE    Collection Time: 10/12/19 12:53 PM   Result Value Ref Range    Sodium 116 (LL) 136 - 145 mmol/L    Potassium 3.0 (L) 3.5 - 5.1 mmol/L    Chloride 72 (L) 98 - 107 mmol/L    CO2 34 (H) 21 - 32 mmol/L    Anion gap 10 7 - 16 mmol/L    Glucose 129 (H) 65 - 100 mg/dL    BUN 5 (L) 8 - 23 MG/DL    Creatinine 0.46 (L) 0.6 - 1.0 MG/DL    GFR est AA >60 >60 ml/min/1.73m2    GFR est non-AA >60 >60 ml/min/1.73m2    Calcium 8.7 8.3 - 10.4 MG/DL    Bilirubin, total 0.8 0.2 - 1.1 MG/DL    ALT (SGPT) 18 12 - 65 U/L    AST (SGOT) 28 15 - 37 U/L    Alk.  phosphatase 159 (H) 50 - 130 U/L    Protein, total 6.1 (L) 6.3 - 8.2 g/dL    Albumin 3.0 (L) 3.2 - 4.6 g/dL    Globulin 3.1 2.3 - 3.5 g/dL    A-G Ratio 1.0 (L) 1.2 - 3.5     MAGNESIUM    Collection Time: 10/12/19 12:53 PM   Result Value Ref Range    Magnesium 1.7 (L) 1.8 - 2.4 mg/dL   EKG, 12 LEAD, INITIAL    Collection Time: 10/12/19 12:59 PM   Result Value Ref Range    Ventricular Rate 58 BPM    Atrial Rate 56 BPM    QRS Duration 98 ms    Q-T Interval 434 ms    QTC Calculation (Bezet) 426 ms    Calculated R Axis 22 degrees    Calculated T Axis 63 degrees    Diagnosis       !!! Poor data quality, interpretation may be adversely affected  Sinus bradycardia with PACs  Abnormal ECG  When compared with ECG of 25-AUG-2019 09:00,  Borderline criteria for Lateral infarct are no longer Present  Confirmed by ST ALISHA MILLARD MD (), DEEPTI CLIFTON ([id number]) on 10/12/2019 4:15:05 PM     URINALYSIS W/ RFLX MICROSCOPIC    Collection Time: 10/12/19  1:35 PM   Result Value Ref Range    Color YELLOW      Appearance TURBID      Specific gravity 1.007 1.001 - 1.023      pH (UA) 7.0 5.0 - 9.0      Protein TRACE (A) NEG mg/dL    Glucose NEGATIVE  mg/dL    Ketone NEGATIVE  NEG mg/dL    Bilirubin NEGATIVE  NEG      Blood SMALL (A) NEG      Urobilinogen 1.0 0.2 - 1.0 EU/dL    Nitrites NEGATIVE  NEG      Leukocyte Esterase LARGE (A) NEG      WBC >100 (H) 0 /hpf    RBC 3-5 0 /hpf    Epithelial cells 0-3 0 /hpf    Bacteria 4+ (H) 0 /hpf    Casts 0-3 0 /lpf   CULTURE, URINE    Collection Time: 10/12/19  1:35 PM   Result Value Ref Range    Special Requests: NO SPECIAL REQUESTS      Culture result: >100,000 COLONIES/mL GRAM NEGATIVE RODS (A)      Culture result: IDENTIFICATION AND SUSCEPTIBILITY TO FOLLOW     SODIUM, UR, RANDOM    Collection Time: 10/12/19  1:35 PM   Result Value Ref Range    Sodium,urine random 26 MMOL/L   OSMOLALITY, UR    Collection Time: 10/12/19  1:39 PM   Result Value Ref Range    Osmolality,urine 168 MOSM/kg H7S   METABOLIC PANEL, BASIC    Collection Time: 10/12/19  5:28 PM   Result Value Ref Range    Sodium 114 (LL) 136 - 145 mmol/L    Potassium 3.6 3.5 - 5.1 mmol/L    Chloride 81 (L) 98 - 107 mmol/L    CO2 25 21 - 32 mmol/L    Anion gap 8 7 - 16 mmol/L    Glucose 125 (H) 65 - 100 mg/dL    BUN 5 (L) 8 - 23 MG/DL    Creatinine 0.24 (L) 0.6 - 1.0 MG/DL    GFR est AA >60 >60 ml/min/1.73m2    GFR est non-AA >60 >60 ml/min/1.73m2    Calcium 8.2 (L) 8.3 - 31.3 MG/DL   METABOLIC PANEL, BASIC    Collection Time: 10/13/19 12:04 AM   Result Value Ref Range    Sodium 115 (LL) 136 - 145 mmol/L    Potassium 3.7 3.5 - 5.1 mmol/L    Chloride 85 (L) 98 - 107 mmol/L    CO2 29 21 - 32 mmol/L    Anion gap 1 (L) 7 - 16 mmol/L    Glucose 125 (H) 65 - 100 mg/dL    BUN 3 (L) 8 - 23 MG/DL    Creatinine 0.27 (L) 0.6 - 1.0 MG/DL    GFR est AA >60 >60 ml/min/1.73m2    GFR est non-AA >60 >60 ml/min/1.73m2    Calcium 8.3 8.3 - 18.6 MG/DL   METABOLIC PANEL, BASIC    Collection Time: 10/13/19  6:01 AM   Result Value Ref Range    Sodium 118 (LL) 136 - 145 mmol/L    Potassium 3.4 (L) 3.5 - 5.1 mmol/L    Chloride 83 (L) 98 - 107 mmol/L    CO2 28 21 - 32 mmol/L    Anion gap 7 7 - 16 mmol/L    Glucose 93 65 - 100 mg/dL    BUN 3 (L) 8 - 23 MG/DL    Creatinine 0.22 (L) 0.6 - 1.0 MG/DL    GFR est AA >60 >60 ml/min/1.73m2    GFR est non-AA >60 >60 ml/min/1.73m2    Calcium 8.6 8.3 - 10.4 MG/DL   CBC W/O DIFF    Collection Time: 10/13/19  6:01 AM   Result Value Ref Range    WBC 9.3 4.3 - 11.1 K/uL    RBC 3.82 (L) 4.05 - 5.2 M/uL    HGB 11.8 11.7 - 15.4 g/dL    HCT 34.6 (L) 35.8 - 46.3 %    MCV 90.6 79.6 - 97.8 FL    MCH 30.9 26.1 - 32.9 PG    MCHC 34.1 31.4 - 35.0 g/dL    RDW 13.8 11.9 - 14.6 %    PLATELET 214 182 - 181 K/uL    MPV 9.3 (L) 9.4 - 12.3 FL    ABSOLUTE NRBC 0.00 0.0 - 0.2 K/uL       Assessment/ Plan:     Principal Problem:    Metabolic encephalopathy (11/40/7854)    Active Problems:    Hyponatremia (2/9/2018)      HTN (hypertension) (8/18/2016)      DNR (do not resuscitate) (9/11/2016)      UTI (urinary tract infection) (52/05/1515)    Metabolic encephalopathy secondary to hyponatremia and UTI. More alert today and answering more questions. Perform bedside speech eval. If passes, can start GI soft diet due to poor dentition.      Hyponatremia - Decrease dose of Cymbalta since can cause SIADH in the elderly but want to avoid withdrawal. Hold Bumetanide and lasix. Suspecting hyponatremia from dehydration. Give gentle hydration and 1L NS bolus. Monitor BMP q 6 hr. Avoid overcorrection no more than 8 units in 24 hours to avoid demyelination. Repeat urine Na this afternoon.      UTI - urine cultures in the past have grown ESBL sensitive to zosyn. Will start Zosyn. Order urine culture that is growing gram negative rods.      HTN- amlodipine 5mg daily, Lisinopril 40mg daily      Hypokalemia - Supplement     Hypomagnesemia - normal      Place as bedrest.      ECHO EF 60-65%, no wall motion abnormality and mild diastolic dysfunction grade 1 in 2018. Mild aortic stenosis seen.  Monitor for signs of volume overload since holding diuretics and giving gentle hydration.      DVT prophylaxis - heparin      Signed By: Lily Mary DO     October 13, 2019

## 2019-10-13 NOTE — PROGRESS NOTES
Problem: Falls - Risk of  Goal: *Absence of Falls  Description  Document Rosangela [place] Fall Risk and appropriate interventions in the flowsheet. Outcome: Progressing Towards Goal  Note:   Fall Risk Interventions:  Mobility Interventions: PT Consult for mobility concerns, PT Consult for assist device competence    Mentation Interventions: Bed/chair exit alarm, More frequent rounding    Medication Interventions: Evaluate medications/consider consulting pharmacy    Elimination Interventions: Bed/chair exit alarm, Call light in reach, Patient to call for help with toileting needs              Problem: Patient Education: Go to Patient Education Activity  Goal: Patient/Family Education  Outcome: Progressing Towards Goal     Problem: Pressure Injury - Risk of  Goal: *Prevention of pressure injury  Description  Document Sergei Scale and appropriate interventions in the flowsheet. Outcome: Progressing Towards Goal  Note:   Pressure Injury Interventions:  Sensory Interventions: Assess changes in LOC    Moisture Interventions: Absorbent underpads    Activity Interventions: Assess need for specialty bed    Mobility Interventions: PT/OT evaluation    Nutrition Interventions: Discuss nutritional consult with provider    Friction and Shear Interventions: Lift sheet                Problem: Pressure Injury - Risk of  Goal: *Prevention of pressure injury  Description  Document Sergei Scale and appropriate interventions in the flowsheet.   Outcome: Progressing Towards Goal  Note:   Pressure Injury Interventions:  Sensory Interventions: Assess changes in LOC    Moisture Interventions: Absorbent underpads    Activity Interventions: Assess need for specialty bed    Mobility Interventions: PT/OT evaluation    Nutrition Interventions: Discuss nutritional consult with provider    Friction and Shear Interventions: Lift sheet

## 2019-10-13 NOTE — PROGRESS NOTES
Location of Assessment: ICU  (Called Son)     Socioevironmental:  Patient admitted with AMS. SW called the patient's son and emergency contact Jonny Molina ([phone]). Jonny Molina states that his mother lives at [facility], but has been in their SNF for STR since she broke her femur three and a half weeks ago. Ambulation/ADLs:  Jonny Molina states that his mother ambulates with a wheelchair as she is non-weightbearing since her fall. He states that the STR had recently began working with the patient to bear weight, and she was able to [place] about 25%. \" The patient currently requires assistance with ADLs and has a hx of falls. Primary Care:  Patient sees Dr. Ware Speaks. Needs:   Per Jonny Molina, the patient's plan is to return to [place] for STR at discharge. SW will refer the patient through [facility] and continue to follow.      Lam Borrego, [address] [facility]    [address] [facility]  [email or web address]

## 2019-10-13 NOTE — PHYSICIAN ADVISORY
Letter of Status Determination: Current Status INPATIENT is Appropriate Pt Name:  Cheryl Duff MR#  [id number] [facility]#   [id number] [facility]  376/01  @ [address] [place] [address] Hospitalization date  10/12/2019 12:18 PM  
Current Attending Physician  Drew Jack DO  
Principal diagnosis  Metabolic encephalopathy Hyponatremia Clinicals  80 y.o. y.o  female hospitalized with AMS with hyponatremia-  and UTI. Urine cx pending, on empiric zosyn. [place] MCG criteria Does  NOT apply STATUS DETERMINATION  This patient is at high risk of adverse events and deterioration based on documented clinical data, comorbid conditions and current acute care course. Ms. Cheryl Duff is expected to meet Inpatient Admission status criteria in accordance with CMS regulation Section 43 .3. Specifically, due to medical necessity the patient's stay is expected to exceed Two Midnights. On the basis of clinical data, available documentaion, we believe that the current status of this patient as INPATIENT is Appropriate The final decision of the patient's hospitalization status depends on the attending physician's judgment. Additional comments [place]  [place] / [place] / Product Type: Medicare / Insurance Information [facility] [address] [place] [address] MEDICARE PART A AND B Phone:   
 SubscriberMrory San I Subscriber#: [id number] Group#:  Precert#:   
  
 
 
The information in this document is a recommendation to be used for utilization review and utilization management purposes only. This recommendation is not an order. The recommendation is made based on the information reviewed at the time of the referral, is pursuant to the [employer] [facility] Conditions of Participation (42 CFR Part 482), and is neither a judgment nor an assessment with regard to the appropriateness or quality of clinical care. For all Managed Care patients: The Criteria are intended solely for use as screening guidelines with respect to the medical appropriateness of healthcare services and not for final clinical or payment determinations concerning the type or level of medical care provided, or proposed to be provided, to a patient. They help the reviewers determine whether a patient is appropriate for observation or inpatient admission at the time a decision to admit the patient is being made. All efforts are made to apply the pertinent payor criteria (MCG or InterQual) as well as the clinical judgements based on the information reviewed at the time of the referral.\" Nothing in this document may be used to limit, alter, or affect clinical services provided to the patient named below. Miranda Maria MD 
Physician Advisor [address] C:  
Betsy [email or web address]. [email or web address] 
 
  
2:31 PM 10/13/2019

## 2019-10-13 NOTE — PROGRESS NOTES
SW reviewed patient's chart and conducted a baseline assessment. Discharge plan at this time is as follows:     Care Management Interventions  PCP Verified by CM:  Yes  Mode of Transport at Discharge: BLS  Transition of Care Consult (CM Consult): SNF  Partner SNF: Yes  Physical Therapy Consult: Yes  Occupational Therapy Consult: Yes  Current Support Network: Assisted Living([facility])  Confirm Follow Up Transport: Family  Plan discussed with Pt/Family/Caregiver: Yes  Freedom of Choice Offered: Yes  Discharge Location  Discharge Placement: Skilled nursing facility([facility])    Jude Doe, [address] Work   [address] [place] [address]  [email or web address]

## 2019-10-14 LAB
ANION GAP SERPL CALC-SCNC: 11 MMOL/L (ref 7–16)
ANION GAP SERPL CALC-SCNC: 6 MMOL/L (ref 7–16)
ANION GAP SERPL CALC-SCNC: 9 MMOL/L (ref 7–16)
BUN SERPL-MCNC: 6 MG/DL (ref 8–23)
BUN SERPL-MCNC: 6 MG/DL (ref 8–23)
BUN SERPL-MCNC: 8 MG/DL (ref 8–23)
CALCIUM SERPL-MCNC: 7.9 MG/DL (ref 8.3–10.4)
CALCIUM SERPL-MCNC: 8.1 MG/DL (ref 8.3–10.4)
CALCIUM SERPL-MCNC: 8.1 MG/DL (ref 8.3–10.4)
CHLORIDE SERPL-SCNC: 92 MMOL/L (ref 98–107)
CHLORIDE SERPL-SCNC: 93 MMOL/L (ref 98–107)
CHLORIDE SERPL-SCNC: 94 MMOL/L (ref 98–107)
CO2 SERPL-SCNC: 21 MMOL/L (ref 21–32)
CO2 SERPL-SCNC: 22 MMOL/L (ref 21–32)
CO2 SERPL-SCNC: 25 MMOL/L (ref 21–32)
CREAT SERPL-MCNC: 0.41 MG/DL (ref 0.6–1)
CREAT SERPL-MCNC: 0.46 MG/DL (ref 0.6–1)
CREAT SERPL-MCNC: 0.49 MG/DL (ref 0.6–1)
GLUCOSE SERPL-MCNC: 166 MG/DL (ref 65–100)
GLUCOSE SERPL-MCNC: 175 MG/DL (ref 65–100)
GLUCOSE SERPL-MCNC: 192 MG/DL (ref 65–100)
POTASSIUM SERPL-SCNC: 3.4 MMOL/L (ref 3.5–5.1)
POTASSIUM SERPL-SCNC: 3.8 MMOL/L (ref 3.5–5.1)
POTASSIUM SERPL-SCNC: 4.4 MMOL/L (ref 3.5–5.1)
SODIUM SERPL-SCNC: 123 MMOL/L (ref 136–145)
SODIUM SERPL-SCNC: 123 MMOL/L (ref 136–145)
SODIUM SERPL-SCNC: 127 MMOL/L (ref 136–145)

## 2019-10-14 PROCEDURE — 80048 BASIC METABOLIC PNL TOTAL CA: CPT

## 2019-10-14 PROCEDURE — 87040 BLOOD CULTURE FOR BACTERIA: CPT

## 2019-10-14 PROCEDURE — 86580 TB INTRADERMAL TEST: CPT | Performed by: FAMILY MEDICINE

## 2019-10-14 PROCEDURE — 65610000001 HC ROOM ICU GENERAL

## 2019-10-14 PROCEDURE — 74011000302 HC RX REV CODE- 302: Performed by: FAMILY MEDICINE

## 2019-10-14 PROCEDURE — 74011250637 HC RX REV CODE- 250/637: Performed by: FAMILY MEDICINE

## 2019-10-14 PROCEDURE — 74011000258 HC RX REV CODE- 258: Performed by: FAMILY MEDICINE

## 2019-10-14 PROCEDURE — 36416 COLLJ CAPILLARY BLOOD SPEC: CPT

## 2019-10-14 PROCEDURE — 77030020263 HC SOL INJ SOD CL0.9% LFCR 1000ML

## 2019-10-14 PROCEDURE — 74011250636 HC RX REV CODE- 250/636: Performed by: FAMILY MEDICINE

## 2019-10-14 RX ORDER — SULFAMETHOXAZOLE AND TRIMETHOPRIM 800; 160 MG/1; MG/1
0.5 TABLET ORAL EVERY 12 HOURS
Status: DISCONTINUED | OUTPATIENT
Start: 2019-10-14 | End: 2019-10-15

## 2019-10-14 RX ORDER — SODIUM CHLORIDE 9 MG/ML
50 INJECTION, SOLUTION INTRAVENOUS CONTINUOUS
Status: DISCONTINUED | OUTPATIENT
Start: 2019-10-14 | End: 2019-10-15

## 2019-10-14 RX ORDER — TRAMADOL HYDROCHLORIDE 50 MG/1
50 TABLET ORAL
Status: DISCONTINUED | OUTPATIENT
Start: 2019-10-14 | End: 2019-10-17 | Stop reason: HOSPADM

## 2019-10-14 RX ORDER — POTASSIUM CHLORIDE 1.5 G/1.77G
40 POWDER, FOR SOLUTION ORAL
Status: COMPLETED | OUTPATIENT
Start: 2019-10-14 | End: 2019-10-14

## 2019-10-14 RX ADMIN — PIPERACILLIN AND TAZOBACTAM 3.38 G: 3; .375 INJECTION, POWDER, LYOPHILIZED, FOR SOLUTION INTRAVENOUS at 08:44

## 2019-10-14 RX ADMIN — ASPIRIN 81 MG 81 MG: 81 TABLET ORAL at 09:58

## 2019-10-14 RX ADMIN — AMLODIPINE BESYLATE 5 MG: 5 TABLET ORAL at 09:58

## 2019-10-14 RX ADMIN — SULFAMETHOXAZOLE AND TRIMETHOPRIM 0.5 TABLET: 800; 160 TABLET ORAL at 09:58

## 2019-10-14 RX ADMIN — HEPARIN SODIUM 5000 UNITS: 5000 INJECTION INTRAVENOUS; SUBCUTANEOUS at 15:49

## 2019-10-14 RX ADMIN — LISINOPRIL 40 MG: 20 TABLET ORAL at 09:58

## 2019-10-14 RX ADMIN — HEPARIN SODIUM 5000 UNITS: 5000 INJECTION INTRAVENOUS; SUBCUTANEOUS at 22:18

## 2019-10-14 RX ADMIN — TUBERCULIN PURIFIED PROTEIN DERIVATIVE 5 UNITS: 5 INJECTION INTRADERMAL at 10:00

## 2019-10-14 RX ADMIN — DULOXETINE HYDROCHLORIDE 30 MG: 30 CAPSULE, DELAYED RELEASE ORAL at 09:58

## 2019-10-14 RX ADMIN — DEXTROSE MONOHYDRATE AND SODIUM CHLORIDE 50 ML/HR: 5; .45 INJECTION, SOLUTION INTRAVENOUS at 01:19

## 2019-10-14 RX ADMIN — HEPARIN SODIUM 5000 UNITS: 5000 INJECTION INTRAVENOUS; SUBCUTANEOUS at 08:02

## 2019-10-14 RX ADMIN — POTASSIUM CHLORIDE 40 MEQ: 1.5 POWDER, FOR SOLUTION ORAL at 15:50

## 2019-10-14 RX ADMIN — SODIUM CHLORIDE 50 ML/HR: 900 INJECTION, SOLUTION INTRAVENOUS at 14:27

## 2019-10-14 RX ADMIN — TRAMADOL HYDROCHLORIDE 50 MG: 50 TABLET, FILM COATED ORAL at 19:07

## 2019-10-14 RX ADMIN — PANTOPRAZOLE SODIUM 40 MG: 40 TABLET, DELAYED RELEASE ORAL at 08:02

## 2019-10-14 RX ADMIN — SULFAMETHOXAZOLE AND TRIMETHOPRIM 0.5 TABLET: 800; 160 TABLET ORAL at 22:18

## 2019-10-14 RX ADMIN — DEXTROSE MONOHYDRATE AND SODIUM CHLORIDE 50 ML/HR: 5; .45 INJECTION, SOLUTION INTRAVENOUS at 08:05

## 2019-10-14 RX ADMIN — METOPROLOL SUCCINATE 100 MG: 100 TABLET, EXTENDED RELEASE ORAL at 09:57

## 2019-10-14 NOTE — PROGRESS NOTES
Report received. Chart reviewed. Patient alert. Oriented to person, place, follows simple commands. Speech fairly clear. With movements complaining of discomfort in both legs, she states it is related to her neuropathy. Would like medication if she can have it. Rating pain at a 8/10. Patient repositioned with pillows. Mouth care done and she drank water with no difficulty. Head of bed elevated 30 degrees, call bell within reach. She was instructed to call nursing for any needs.

## 2019-10-14 NOTE — PROGRESS NOTES
Progress Note    Patient: Lj Simms MRN: [id number]  SSN: [id number]    YOB: 1927  Age: 80 y.o. Sex: female      Admit Date: 10/12/2019    LOS: 2 days     Subjective:   F/U hyponatremia, UTI    80 y.o. female who presented to the ED for cc altered mental status. Patient has been declining since right femoral repair last month from [facility]. Hx of hypokalemia, HTN, GERD, diastolic CHF EF 63-56%. Patient does admit to not eating well for the past week. Labs- WBC 9.2. UA consistent with UTI, Na 116. K 3. Mg 1.7, Influenza negative on day of admission. Patient admitted to ICU with gentle hydration along with starting Zosyn since urine cultures in the past have grown ESBL sensitive to Zosyn. Urine culture growing gram negative rods. Na improving to 123 today. Urine sodium 81, urine osmolality 168, AM cortisol 21.3. Serum osmolality 232.      Patient is from [place]      Today, patient is lethargic after being given pain pill last night. Slept throughout the night. She denies pain to right hip or leg today. Feeling better compared to yesterday but still weak. No chest pain or SOB.  Will eat some of her meals if fed  Current Facility-Administered Medications   Medication Dose Route Frequency    piperacillin-tazobactam (ZOSYN) 3.375 g in 0.9% sodium chloride (MBP/ADV) 100 mL  3.375 g IntraVENous Q12H    dextrose 5 % - 0.45% NaCl infusion  50 mL/hr IntraVENous CONTINUOUS    acetaminophen (TYLENOL) tablet 650 mg  650 mg Oral Q4H PRN    HYDROcodone-acetaminophen (NORCO) 5-325 mg per tablet 1 Tab  1 Tab Oral Q4H PRN    naloxone (NARCAN) injection 0.4 mg  0.4 mg IntraVENous PRN    ondansetron (ZOFRAN) injection 4 mg  4 mg IntraVENous Q4H PRN    heparin (porcine) injection 5,000 Units  5,000 Units SubCUTAneous Q8H    polyethylene glycol (MIRALAX) packet 17 g  17 g Oral DAILY PRN    DULoxetine (CYMBALTA) capsule 30 mg  30 mg Oral DAILY    aspirin chewable tablet 81 mg  81 mg Oral DAILY    amLODIPine (NORVASC) tablet 5 mg  5 mg Oral DAILY    lisinopril (PRINIVIL, ZESTRIL) tablet 40 mg  40 mg Oral DAILY    pantoprazole (PROTONIX) tablet 40 mg  40 mg Oral DAILY    metoprolol succinate (TOPROL-XL) XL tablet 100 mg  100 mg Oral DAILY    albuterol (PROVENTIL VENTOLIN) nebulizer solution 2.5 mg  2.5 mg Nebulization Q6H PRN       Objective:     Vitals:    10/14/19 0002 10/14/19 0114 10/14/19 0433 10/14/19 0459   BP:  96/53 116/60 110/54   Pulse: 70 72 69 72   Resp: 27 22 26 27   Temp:  98 °F (36.7 °C)     SpO2: 93% 92% 95% 94%   Weight:       Height:             Intake and Output:  Current Shift: No intake/output data recorded. Last three shifts: 10/12 1901 - 10/14 0700  In: 2410.2 [I.V.:2410.2]  Out: 350 [Urine:350]    Physical Exam:   General:  Alert, no distress, frail appearing. Slow to answer questions   Eyes:  Conjunctivae/corneas clear. Ears:  Normal TMs and external ear canals both ears. Nose: Nares normal. Septum midline. Mouth/Throat: Dry oral mucosa. Dry cracking lips. Neck:  no JVD. Back:   deferred   Lungs:   Clear to auscultation bilaterally. Heart:  Regular rate and rhythm, S1, S2 normal   Abdomen:   Soft, non-tender. Bowel sounds normal.    Extremities: Extremities normal, atraumatic, no cyanosis or edema. Scar to lateral right hip from recent surgery. No signs of drainage. Pulses: 2+ and symmetric all extremities. Skin: Skin color, texture, turgor normal. No rashes or lesions   Lymph nodes: Cervical, supraclavicular, and axillary nodes normal.   Neurologic: Limited movements in bed.         Lab/Data Review:    Recent Results (from the past 24 hour(s))   METABOLIC PANEL, BASIC    Collection Time: 10/13/19 12:06 PM   Result Value Ref Range    Sodium 120 (LL) 136 - 145 mmol/L    Potassium 3.7 3.5 - 5.1 mmol/L    Chloride 91 (L) 98 - 107 mmol/L    CO2 24 21 - 32 mmol/L    Anion gap 5 (L) 7 - 16 mmol/L    Glucose 160 (H) 65 - 100 mg/dL    BUN 4 (L) 8 - 23 MG/DL Creatinine 0.27 (L) 0.6 - 1.0 MG/DL    GFR est AA >60 >60 ml/min/1.73m2    GFR est non-AA >60 >60 ml/min/1.73m2    Calcium 7.9 (L) 8.3 - 10.4 MG/DL   SODIUM, UR, RANDOM    Collection Time: 10/13/19  3:38 PM   Result Value Ref Range    Sodium,urine random 81 MMOL/L   METABOLIC PANEL, BASIC    Collection Time: 10/13/19  5:43 PM   Result Value Ref Range    Sodium 121 (LL) 136 - 145 mmol/L    Potassium 3.7 3.5 - 5.1 mmol/L    Chloride 92 (L) 98 - 107 mmol/L    CO2 25 21 - 32 mmol/L    Anion gap 4 (L) 7 - 16 mmol/L    Glucose 195 (H) 65 - 100 mg/dL    BUN 3 (L) 8 - 23 MG/DL    Creatinine 0.23 (L) 0.6 - 1.0 MG/DL    GFR est AA >60 >60 ml/min/1.73m2    GFR est non-AA >60 >60 ml/min/1.73m2    Calcium 8.0 (L) 8.3 - 65.2 MG/DL   METABOLIC PANEL, BASIC    Collection Time: 10/14/19  3:11 AM   Result Value Ref Range    Sodium 123 (LL) 136 - 145 mmol/L    Potassium 3.8 3.5 - 5.1 mmol/L    Chloride 93 (L) 98 - 107 mmol/L    CO2 21 21 - 32 mmol/L    Anion gap 9 7 - 16 mmol/L    Glucose 192 (H) 65 - 100 mg/dL    BUN 6 (L) 8 - 23 MG/DL    Creatinine 0.49 (L) 0.6 - 1.0 MG/DL    GFR est AA >60 >60 ml/min/1.73m2    GFR est non-AA >60 >60 ml/min/1.73m2    Calcium 7.9 (L) 8.3 - 10.4 MG/DL       Assessment/ Plan:     Principal Problem:    Metabolic encephalopathy (46/18/0060)    Active Problems:    Hyponatremia (2/9/2018)      HTN (hypertension) (8/18/2016)      DNR (do not resuscitate) (9/11/2016)      UTI (urinary tract infection) (14/60/1650)    Metabolic encephalopathy secondary to hyponatremia and UTI. Tx as below     Hyponatremia - Decrease dose of Cymbalta since can cause SIADH in the elderly but want to avoid withdrawal. Holding Bumetanide and lasix. Suspecting hyponatremia from dehydration. Give gentle hydration. Monitor BMP q 6 hr. Avoid overcorrection no more than 8 units in 24 hours to avoid demyelination.      UTI - urine cultures in the past have grown ESBL sensitive to zosyn. Continue Zosyn started 10/12.  Urine culture growing gram negative rods. HTN- amlodipine 5mg daily, Lisinopril 40mg daily      Hypokalemia - normal     Hypomagnesemia - normal      Place as bedrest until more awake. Will change PO pain medications to Tramadol since the Norco seems to sedate patient.       ECHO EF 60-65%, no wall motion abnormality and mild diastolic dysfunction grade 1 in 2018. Mild aortic stenosis seen.  Monitor for signs of volume overload since holding diuretics and giving gentle hydration.      DVT prophylaxis - heparin      Signed By: Kelley Castaneda DO     October 14, 2019

## 2019-10-14 NOTE — PROGRESS NOTES
Crying out in pain but when ask she shakes her head no to pain. Socks on feet. Right foot is slightly colder than the left foot.

## 2019-10-14 NOTE — PROGRESS NOTES
Interdisciplinary team rounds were held 10/14/2019 with the following team members:Care Management, Nursing, Pastoral Care, Physical Therapy, Physician and Clinical Coordinator and the patient. Plan of care discussed. See clinical pathway and/or care plan for interventions and desired outcomes.

## 2019-10-14 NOTE — PROGRESS NOTES
Problem: Falls - Risk of  Goal: *Absence of Falls  Description  Document Norma Virgen Fall Risk and appropriate interventions in the flowsheet. Outcome: Progressing Towards Goal  Note:   Fall Risk Interventions:  Mobility Interventions: Bed/chair exit alarm, Patient to call before getting OOB, PT Consult for mobility concerns, PT Consult for assist device competence    Mentation Interventions: Bed/chair exit alarm, Toileting rounds    Medication Interventions: Bed/chair exit alarm    Elimination Interventions: Call light in reach, Patient to call for help with toileting needs              Problem: Patient Education: Go to Patient Education Activity  Goal: Patient/Family Education  Outcome: Progressing Towards Goal     Problem: Pressure Injury - Risk of  Goal: *Prevention of pressure injury  Description  Document Sergei Scale and appropriate interventions in the flowsheet.   Outcome: Progressing Towards Goal  Note:   Pressure Injury Interventions:  Sensory Interventions: Assess changes in LOC    Moisture Interventions: Limit adult briefs, Maintain skin hydration (lotion/cream)    Activity Interventions: Assess need for specialty bed    Mobility Interventions: Assess need for specialty bed    Nutrition Interventions: Document food/fluid/supplement intake, Discuss nutritional consult with provider    Friction and Shear Interventions: Lift sheet                Problem: Patient Education: Go to Patient Education Activity  Goal: Patient/Family Education  Outcome: Progressing Towards Goal

## 2019-10-14 NOTE — PROGRESS NOTES
Nutrition  Reason for assessment: BPA - Malnutrition Screening Tool positive for unintended weight loss. Recently Lost Weight Without Trying: No  If Yes, How Much Weight Loss: Unsure  Eating Poorly Due to Decreased Appetite: Yes  Assessment:   Medical History: Admitted with metabolic encephalopathy secondary to hyponatremia and UTI. PMH remarkable for GERD, HTN, CHF, R femoral repair last month. Food/Nutrition Patient History:  Pt reported to be declining since R femoral repair last month at [facility]. She is lethargic at RD visit with am tray at bedside untouched. No family present. Nursing was able to wake her for am meds which she was reported to take without difficulty. Noted changes in pain meds secondary to lethargy. Nursing reports she requires 1:1 feeding assistance at this time. DIET MECHANICAL SOFT      Anthropometrics:Height: 5' 1\" (154.9 cm),  Weight: 64.9 kg (143 lb 1.6 oz), Weight Source: Bed, Body mass index is 27.04 kg/m². BMI class of overweight. Weight history per EMR review reveals 139-140# from 2017 through August of 2019. Current weight is greater than historical weights available. Macronutrient needs: 65 kg listed weight  EER:  8419-2266 kcal /day (20-25 kcal/kg)  EPR:  65-81 grams protein/day (1-1.25 grams/kg)    Intake/Comparative Standards: Recorded meal(s): none. She is reported to have consumed small amounts when fed. This potentially meets ~25% of kcal and ~25% of protein needs    Nutrition Diagnosis: Inadequate oral intake related to lethargy and debility as evidenced by decreased periods of appropriateness for po secondary to lethargy, requiring feeding assistance when she is alert for po intake, current pattern of intake meeting 25% or less of kcal and protein goals. Intervention:  Meals and snacks: Continue current diet. Nutrition supplement therapy: Ensure Enlive TID. Coordination of nutrition care: Discussed with Toyin Post RN.    Discharge Nutrition Plan: Too soon to determine.      [place], [address], [address]

## 2019-10-14 NOTE — PROGRESS NOTES
Care Management Interventions  PCP Verified by CM: Yes  Mode of Transport at Discharge: BLS  Transition of Care Consult (CM Consult): SNF  Partner SNF: Yes  Physical Therapy Consult: Yes  Occupational Therapy Consult: Yes  Current Support Network: Ass[facility] Living([facility])  Confirm Follow Up Transport: Family  Plan discussed with Pt/Family/Caregiver: Yes  Freedom of Choice Offered: Yes  Discharge Location  Discharge Placement: Skilled nursing facility([facility])  Saw pt in interdisciplinarily rounds with the following disciplines: Physician, Case Management, Nursing, Dietary,Therapy and Pharmacy. The plan of care was discussed along with discharge date/ location. Per MD, will be adjusting pain medications today, if pt becomes more awake then PT can see as well.

## 2019-10-15 LAB
ANION GAP SERPL CALC-SCNC: 4 MMOL/L (ref 7–16)
ANION GAP SERPL CALC-SCNC: 5 MMOL/L (ref 7–16)
ANION GAP SERPL CALC-SCNC: 7 MMOL/L (ref 7–16)
BUN SERPL-MCNC: 4 MG/DL (ref 8–23)
BUN SERPL-MCNC: 5 MG/DL (ref 8–23)
BUN SERPL-MCNC: 7 MG/DL (ref 8–23)
CALCIUM SERPL-MCNC: 8.2 MG/DL (ref 8.3–10.4)
CALCIUM SERPL-MCNC: 8.3 MG/DL (ref 8.3–10.4)
CALCIUM SERPL-MCNC: 8.3 MG/DL (ref 8.3–10.4)
CHLORIDE SERPL-SCNC: 93 MMOL/L (ref 98–107)
CHLORIDE SERPL-SCNC: 94 MMOL/L (ref 98–107)
CHLORIDE SERPL-SCNC: 95 MMOL/L (ref 98–107)
CO2 SERPL-SCNC: 26 MMOL/L (ref 21–32)
CO2 SERPL-SCNC: 26 MMOL/L (ref 21–32)
CO2 SERPL-SCNC: 28 MMOL/L (ref 21–32)
CREAT SERPL-MCNC: 0.21 MG/DL (ref 0.6–1)
CREAT SERPL-MCNC: 0.21 MG/DL (ref 0.6–1)
CREAT SERPL-MCNC: 0.23 MG/DL (ref 0.6–1)
ERYTHROCYTE [DISTWIDTH] IN BLOOD BY AUTOMATED COUNT: 13.9 % (ref 11.9–14.6)
GLUCOSE SERPL-MCNC: 121 MG/DL (ref 65–100)
GLUCOSE SERPL-MCNC: 132 MG/DL (ref 65–100)
GLUCOSE SERPL-MCNC: 98 MG/DL (ref 65–100)
HCT VFR BLD AUTO: 30.7 % (ref 35.8–46.3)
HGB BLD-MCNC: 10.5 G/DL (ref 11.7–15.4)
MCH RBC QN AUTO: 31.4 PG (ref 26.1–32.9)
MCHC RBC AUTO-ENTMCNC: 34.2 G/DL (ref 31.4–35)
MCV RBC AUTO: 91.9 FL (ref 79.6–97.8)
MM INDURATION POC: 0 MM (ref 0–5)
NRBC # BLD: 0 K/UL (ref 0–0.2)
PLATELET # BLD AUTO: 116 K/UL (ref 150–450)
PMV BLD AUTO: 9 FL (ref 9.4–12.3)
POTASSIUM SERPL-SCNC: 3.6 MMOL/L (ref 3.5–5.1)
POTASSIUM SERPL-SCNC: 3.6 MMOL/L (ref 3.5–5.1)
POTASSIUM SERPL-SCNC: 3.7 MMOL/L (ref 3.5–5.1)
PPD POC: NEGATIVE NEGATIVE
RBC # BLD AUTO: 3.34 M/UL (ref 4.05–5.2)
SODIUM SERPL-SCNC: 124 MMOL/L (ref 136–145)
SODIUM SERPL-SCNC: 125 MMOL/L (ref 136–145)
SODIUM SERPL-SCNC: 129 MMOL/L (ref 136–145)
WBC # BLD AUTO: 11.4 K/UL (ref 4.3–11.1)

## 2019-10-15 PROCEDURE — 80048 BASIC METABOLIC PNL TOTAL CA: CPT

## 2019-10-15 PROCEDURE — 65610000001 HC ROOM ICU GENERAL

## 2019-10-15 PROCEDURE — 74011250636 HC RX REV CODE- 250/636: Performed by: HOSPITALIST

## 2019-10-15 PROCEDURE — 74011000258 HC RX REV CODE- 258: Performed by: HOSPITALIST

## 2019-10-15 PROCEDURE — 74011250637 HC RX REV CODE- 250/637: Performed by: FAMILY MEDICINE

## 2019-10-15 PROCEDURE — 77030020263 HC SOL INJ SOD CL0.9% LFCR 1000ML

## 2019-10-15 PROCEDURE — 85027 COMPLETE CBC AUTOMATED: CPT

## 2019-10-15 PROCEDURE — 36415 COLL VENOUS BLD VENIPUNCTURE: CPT

## 2019-10-15 PROCEDURE — 97530 THERAPEUTIC ACTIVITIES: CPT

## 2019-10-15 PROCEDURE — 97162 PT EVAL MOD COMPLEX 30 MIN: CPT

## 2019-10-15 PROCEDURE — 65270000029 HC RM PRIVATE

## 2019-10-15 PROCEDURE — 74011250636 HC RX REV CODE- 250/636: Performed by: FAMILY MEDICINE

## 2019-10-15 RX ORDER — METOPROLOL SUCCINATE 25 MG/1
50 TABLET, EXTENDED RELEASE ORAL DAILY
Status: DISCONTINUED | OUTPATIENT
Start: 2019-10-16 | End: 2019-10-17 | Stop reason: HOSPADM

## 2019-10-15 RX ORDER — METOPROLOL TARTRATE 5 MG/5ML
5 INJECTION INTRAVENOUS
Status: DISCONTINUED | OUTPATIENT
Start: 2019-10-15 | End: 2019-10-17 | Stop reason: HOSPADM

## 2019-10-15 RX ORDER — SODIUM CHLORIDE AND POTASSIUM CHLORIDE .9; .15 G/100ML; G/100ML
SOLUTION INTRAVENOUS CONTINUOUS
Status: DISCONTINUED | OUTPATIENT
Start: 2019-10-15 | End: 2019-10-15

## 2019-10-15 RX ORDER — HEPARIN SODIUM 5000 [USP'U]/ML
5000 INJECTION, SOLUTION INTRAVENOUS; SUBCUTANEOUS EVERY 12 HOURS
Status: DISCONTINUED | OUTPATIENT
Start: 2019-10-15 | End: 2019-10-17 | Stop reason: HOSPADM

## 2019-10-15 RX ADMIN — PANTOPRAZOLE SODIUM 40 MG: 40 TABLET, DELAYED RELEASE ORAL at 07:57

## 2019-10-15 RX ADMIN — HEPARIN SODIUM 5000 UNITS: 5000 INJECTION INTRAVENOUS; SUBCUTANEOUS at 21:30

## 2019-10-15 RX ADMIN — DULOXETINE HYDROCHLORIDE 30 MG: 30 CAPSULE, DELAYED RELEASE ORAL at 09:43

## 2019-10-15 RX ADMIN — AMLODIPINE BESYLATE 5 MG: 5 TABLET ORAL at 09:43

## 2019-10-15 RX ADMIN — HEPARIN SODIUM 5000 UNITS: 5000 INJECTION INTRAVENOUS; SUBCUTANEOUS at 09:41

## 2019-10-15 RX ADMIN — METOPROLOL SUCCINATE 100 MG: 100 TABLET, EXTENDED RELEASE ORAL at 09:42

## 2019-10-15 RX ADMIN — ASPIRIN 81 MG 81 MG: 81 TABLET ORAL at 09:42

## 2019-10-15 RX ADMIN — MEROPENEM 500 MG: 500 INJECTION, POWDER, FOR SOLUTION INTRAVENOUS at 16:25

## 2019-10-15 RX ADMIN — SULFAMETHOXAZOLE AND TRIMETHOPRIM 0.5 TABLET: 800; 160 TABLET ORAL at 09:43

## 2019-10-15 RX ADMIN — LISINOPRIL 40 MG: 20 TABLET ORAL at 09:43

## 2019-10-15 NOTE — PROGRESS NOTES
Hospitalist Note     Admit Date:  10/12/2019 12:18 PM   Name:  Paulette Moreira   Age:  80 y.o.  :  1927   MRN:  [id number]   PCP:  Virginia Hernandez MD  Treatment Team: Attending Provider: Charo Sotomayor DO; : Yadi Mccann; Care Manager: Martha Guzman RN    HPI/Subjective:   F/U hyponatremia, UTI     80 y. o. female who presented to the ED for cc altered mental status. Patient has been declining since right femoral repair last month from [facility]. Hx of hypokalemia, HTN, GERD, diastolic CHF EF 68-70%. Patient does admit to not eating well for the past week. Labs- WBC 9.2. UA consistent with UTI, Na 116. K 3. Mg 1.7, Influenza negative on day of admission. Patient admitted to ICU with gentle hydration along with starting Zosyn since urine cultures in the past have grown ESBL sensitive to Zosyn. Urine culture growing gram negative rods. Na improving to 123 today. Urine sodium 81, urine osmolality 168, AM cortisol 21.3. Serum osmolality 232.      Patient is from [place]. 10/15/19 patient alert awake oriented at baseline mental status this morning. RN reported that overnight the patient has been stating that she would like to go to [facility] living today. This morning patient denies any chest pain nausea vomiting. Patient's son and family at bedside this afternoon. Updated current plan of care.      Objective:     Patient Vitals for the past 24 hrs:   Temp Pulse Resp BP SpO2   10/15/19 1225 97.1 °F (36.2 °C)   144/66    10/15/19 0900  82 (!) 38 142/73 97 %   10/15/19 0700 98.3 °F (36.8 °C) 80 (!) 36 155/82 97 %   10/15/19 0500  73 29 143/69 93 %   10/15/19 0300  87 23 151/70 93 %   10/15/19 0100  73 27 143/64 96 %   10/14/19 2308 97.8 °F (36.6 °C)       10/14/19 2300  69 25 140/66 94 %   10/14/19 2100  72 (!) 45 124/64 96 %   10/14/19 1911 99 °F (37.2 °C) 75 (!) 34 123/65 97 %   10/14/19 1700 98.4 °F (36.9 °C) 76 29 139/72 96 %   10/14/19 1459  67 25 118/59 92 %     Oxygen Therapy  O2 Sat (%): 97 % (10/15/19 0900)  Pulse via Oximetry: 82 beats per minute (10/15/19 0900)  O2 Device: Room air (10/14/19 1911)      Intake/Output Summary (Last 24 hours) at 10/15/2019 1414  Last data filed at 10/15/2019 0516  Gross per 24 hour   Intake 1432.83 ml   Output 1825 ml   Net -392.17 ml       *Note that automatically entered I/Os may not be accurate; dependent on patient compliance with collection and accurate  by techs. General:    Well nourished. Alert. CV:   RRR. No murmur, rub, or gallop. Lungs:   CTAB. No wheezing, rhonchi, or rales. Abdomen:   Soft, nontender, nondistended. Extremities: Warm and dry. No cyanosis or edema. Skin:     No rashes or jaundice.    Neuro:  No gross focal deficits    Data Review:  I have reviewed all labs, meds, and studies from the last 24 hours:    Recent Results (from the past 24 hour(s))   METABOLIC PANEL, BASIC    Collection Time: 10/14/19  6:08 PM   Result Value Ref Range    Sodium 127 (L) 136 - 145 mmol/L    Potassium 4.4 3.5 - 5.1 mmol/L    Chloride 94 (L) 98 - 107 mmol/L    CO2 22 21 - 32 mmol/L    Anion gap 11 7 - 16 mmol/L    Glucose 175 (H) 65 - 100 mg/dL    BUN 6 (L) 8 - 23 MG/DL    Creatinine 0.46 (L) 0.6 - 1.0 MG/DL    GFR est AA >60 >60 ml/min/1.73m2    GFR est non-AA >60 >60 ml/min/1.73m2    Calcium 8.1 (L) 8.3 - 70.5 MG/DL   METABOLIC PANEL, BASIC    Collection Time: 10/15/19 12:00 AM   Result Value Ref Range    Sodium 125 (L) 136 - 145 mmol/L    Potassium 3.7 3.5 - 5.1 mmol/L    Chloride 95 (L) 98 - 107 mmol/L    CO2 26 21 - 32 mmol/L    Anion gap 4 (L) 7 - 16 mmol/L    Glucose 98 65 - 100 mg/dL    BUN 7 (L) 8 - 23 MG/DL    Creatinine 0.21 (L) 0.6 - 1.0 MG/DL    GFR est AA >60 >60 ml/min/1.73m2    GFR est non-AA >60 >60 ml/min/1.73m2    Calcium 8.3 8.3 - 95.3 MG/DL   METABOLIC PANEL, BASIC    Collection Time: 10/15/19  5:43 AM   Result Value Ref Range    Sodium 124 (LL) 136 - 145 mmol/L Potassium 3.6 3.5 - 5.1 mmol/L    Chloride 93 (L) 98 - 107 mmol/L    CO2 26 21 - 32 mmol/L    Anion gap 5 (L) 7 - 16 mmol/L    Glucose 121 (H) 65 - 100 mg/dL    BUN 5 (L) 8 - 23 MG/DL    Creatinine 0.21 (L) 0.6 - 1.0 MG/DL    GFR est AA >60 >60 ml/min/1.73m2    GFR est non-AA >60 >60 ml/min/1.73m2    Calcium 8.3 8.3 - 10.4 MG/DL   CBC W/O DIFF    Collection Time: 10/15/19  5:43 AM   Result Value Ref Range    WBC 11.4 (H) 4.3 - 11.1 K/uL    RBC 3.34 (L) 4.05 - 5.2 M/uL    HGB 10.5 (L) 11.7 - 15.4 g/dL    HCT 30.7 (L) 35.8 - 46.3 %    MCV 91.9 79.6 - 97.8 FL    MCH 31.4 26.1 - 32.9 PG    MCHC 34.2 31.4 - 35.0 g/dL    RDW 13.9 11.9 - 14.6 %    PLATELET 950 (L) 057 - 450 K/uL    MPV 9.0 (L) 9.4 - 12.3 FL    ABSOLUTE NRBC 0.00 0.0 - 0.2 K/uL   METABOLIC PANEL, BASIC    Collection Time: 10/15/19 11:52 AM   Result Value Ref Range    Sodium 129 (L) 136 - 145 mmol/L    Potassium 3.6 3.5 - 5.1 mmol/L    Chloride 94 (L) 98 - 107 mmol/L    CO2 28 21 - 32 mmol/L    Anion gap 7 7 - 16 mmol/L    Glucose 132 (H) 65 - 100 mg/dL    BUN 4 (L) 8 - 23 MG/DL    Creatinine 0.23 (L) 0.6 - 1.0 MG/DL    GFR est AA >60 >60 ml/min/1.73m2    GFR est non-AA >60 >60 ml/min/1.73m2    Calcium 8.2 (L) 8.3 - 10.4 MG/DL        All Micro Results     Procedure Component Value Units Date/Time    CULTURE, BLOOD [[id number]] Collected:  10/14/19 0945    Order Status:  Completed Specimen:  Blood Updated:  10/15/19 1247     Special Requests: --        RIGHT  HAND       Culture result: NO GROWTH 1 DAY       CULTURE, BLOOD [[id number]] Collected:  10/14/19 0940    Order Status:  Completed Specimen:  Blood Updated:  10/15/19 1247     Special Requests: --        LEFT  HAND       Culture result: NO GROWTH 1 DAY       CULTURE, URINE [[id number]]  (Abnormal)  (Susceptibility) Collected:  10/12/19 1335    Order Status:  Completed Specimen:  Urine from Clean catch Updated:  10/15/19 0945     Special Requests: NO SPECIAL REQUESTS        Culture result:       >100,000 COLONIES/mL ESCHERICHIA COLI ** (EXTENDED SPECTRUM BETA LACTAMASE ) **                  ** MULTI-DRUG RESISTANT ORGANISM **                  RESULTS VERIFIED, PHONED TO AND READ BACK BY  Irving Blevins RN ON 10/14/19 @0837, ADS        FOSFOMYCIN TO FOLLOW 10/15          No results found for this visit on 10/12/19. Current Meds:  Current Facility-Administered Medications   Medication Dose Route Frequency    heparin (porcine) injection 5,000 Units  5,000 Units SubCUTAneous Q12H    metoprolol (LOPRESSOR) injection 5 mg  5 mg IntraVENous Q6H PRN    [START ON 10/16/2019] metoprolol succinate (TOPROL-XL) XL tablet 50 mg  50 mg Oral DAILY    traMADol (ULTRAM) tablet 50 mg  50 mg Oral Q6H PRN    acetaminophen (TYLENOL) tablet 650 mg  650 mg Oral Q4H PRN    naloxone (NARCAN) injection 0.4 mg  0.4 mg IntraVENous PRN    ondansetron (ZOFRAN) injection 4 mg  4 mg IntraVENous Q4H PRN    polyethylene glycol (MIRALAX) packet 17 g  17 g Oral DAILY PRN    aspirin chewable tablet 81 mg  81 mg Oral DAILY    albuterol (PROVENTIL VENTOLIN) nebulizer solution 2.5 mg  2.5 mg Nebulization Q6H PRN       Other Studies (last 24 hours):  No results found. Assessment and Plan:     Hospital Problems as of 10/15/2019 Date Reviewed: 9/13/2016          Codes Class Noted - Resolved POA    * (Principal) Metabolic encephalopathy GQM-[id number]-OW: G93.41  ICD-9-CM: 348.31  10/12/2019 - Present Unknown        UTI (urinary tract infection) ICD-10-CM: N39.0  ICD-9-CM: 599.0  10/12/2019 - Present Unknown        Hyponatremia ICD-10-CM: E87.1  ICD-9-CM: 276.1  2/9/2018 - Present Unknown        DNR (do not resuscitate) (Chronic) ICD-10-CM: W66  ICD-9-CM: V49.86  9/11/2016 - Present Yes        HTN (hypertension) (Chronic) ICD-10-CM: I10  ICD-9-CM: 401.9  8/18/2016 - Present Yes              Plan: This is a 17-year-old female with:    Acute Metabolic encephalopathy secondary to hyponatremia and UTI.  Tx as below     Hyponatremia - Decrease dose of Cymbalta since can cause SIADH in the elderly but want to avoid withdrawal.   Holding Bumetanide and lasix. Suspecting hyponatremia from dehydration. Give gentle hydration. Monitor BMP. Sodium level is 124 this morning and repeat this afternoon is 129. Avoid overcorrection no more than 8 units in 24 hours to avoid demyelination.      ESBL E. coli urinary tract infection - urine cultures in the past have grown ESBL. switched to meropenem    HTN- amlodipine 5mg daily, Lisinopril 40mg daily      Hypokalemia - normal     Hypomagnesemia - normal      Place as bedrest until more awake. Will change PO pain medications to Tramadol since the Norco seems to sedate patient.       ECHO EF 60-65%, no wall motion abnormality and mild diastolic dysfunction grade 1 in 2018. Mild aortic stenosis seen. Monitor for signs of volume overload since holding diuretics and giving gentle hydration. DC planning/Dispo: Patient to be transferred to medical floor today. PT eval.  Patient will need to be discharged to [facility] skilled nursing facility when medically cleared anticipate in 48 hours.     Diet:  DIET MECHANICAL SOFT  DIET NUTRITIONAL SUPPLEMENTS  DVT ppx: heparin    Signed:  Rivera Spicer MD

## 2019-10-15 NOTE — PROGRESS NOTES
Report received. Chart reviewed. Patient is awake and alert. States she wants to go home. Appears sad. Assured her that she is progressing towards discharge. Denies pain at this time. Underpad changed and repositioned for comfort. Call bell within reach, instructed to call nursing for any needs.

## 2019-10-15 NOTE — PROGRESS NOTES
Interdisciplinary team rounds were held 10/15/2019 with the following team members:Care Management, Nursing, Physical Therapy, Physician and Clinical Coordinator and the patient. Plan of care discussed. See clinical pathway and/or care plan for interventions and desired outcomes.

## 2019-10-15 NOTE — PROGRESS NOTES
Pt did not want to turn on her side. Repositioned in bed. Pulled up on sheet. Pt. Gemma Gerber.   More talkative this pm.

## 2019-10-15 NOTE — PROGRESS NOTES
Care Management Interventions  PCP Verified by CM: Yes  Mode of Transport at Discharge: BLS  Transition of Care Consult (CM Consult): SNF  Partner SNF: Yes  Physical Therapy Consult: Yes  Occupational Therapy Consult: Yes  Current Support Network: Assisted Living([facility])  Confirm Follow Up Transport: Family  Plan discussed with Pt/Family/Caregiver: Yes  Freedom of Choice Offered: Yes  Discharge Location  Discharge Placement: Skilled nursing facility([facility])  Saw pt in interdisciplinarily rounds with the following disciplines: Physician, Case Management, Nursing, Dietary,Therapy and Pharmacy. The plan of care was discussed along with discharge date/ location. Pt has orders to transfer to a m/s bed, pt may d/c back ot [facility] this afternoon or tomorrow per MD. Zaida Reid aware and pt clear to return.

## 2019-10-15 NOTE — PROGRESS NOTES
Patient awake and alert. She is asking why she is in the hospital, she states she wants to go home. Explained she was brought to the hospital because of her sodium being low. She is saying that she does not want to be here. She wants to go home. I asked patient does she not want \" anything at all done\". She specifically stated she wants to \" die with dignity\" . I told her doctors will be here within the next few hours to make rounds and she can speak to them herself, but I will also pass what she has said along in report to the next nurse.

## 2019-10-15 NOTE — PROGRESS NOTES
Late entry due to hands on patient care: Resting, no distress. Vital signs acceptable. CHG bath completed. Patient grimacing a bit with moving. Offered pain medication, she refused.

## 2019-10-15 NOTE — PROGRESS NOTES
Problem: Mobility Impaired (Adult and Pediatric)  Goal: *Acute Goals and Plan of Care (Insert Text)  Description  STG:  (1.)Ms. Ashley Malcolm will move from supine to sit and sit to supine  with CONTACT GUARD ASSIST within 5 treatment day(s). (2.)Ms. Ashley Malcolm will transfer from bed to chair and chair to bed with MINIMAL ASSIST using the least restrictive device within 5 treatment day(s). (3.)Ms. Ashley Malcolm will ambulate with MINIMAL ASSIST for 5 feet with the least restrictive device within 5 treatment day(s). LTG:  (1.)Ms. Ashley Malcolm will move from supine to sit and sit to supine  in bed with SUPERVISION within 10 treatment day(s). (2.)Ms. Ashley Malcolm will transfer from bed to chair and chair to bed with STAND BY ASSIST using the least restrictive device within 10 treatment day(s). (3.)Ms. Ashley Malcolm will ambulate with CONTACT GUARD ASSIST for 10 feet with the least restrictive device within 10 treatment day(s). ________________________________________________________________________________________________     Outcome: Progressing Towards Goal     PHYSICAL THERAPY: Initial Assessment and PM 10/15/2019  INPATIENT: PT Visit Days : 1  [place] / Product Type: Medicare /       NAME/AGE/GENDER: Rita Martinez is a 80 y.o. female   PRIMARY DIAGNOSIS: Hyponatremia [P96.0]  Metabolic encephalopathy [I59.71]  UTI (urinary tract infection) [X55.6] Metabolic encephalopathy   Metabolic encephalopathy          ICD-10: Treatment Diagnosis:    · Generalized Muscle Weakness (M62.81)  · Other lack of cordination (R27.8)  · Difficulty in walking, Not elsewhere classified (R26.2)   Precaution/Allergies:  Sulfa (sulfonamide antibiotics)      ASSESSMENT:     Ms. Ashley Malcolm presents with above diagnoses. Patient had a fall with R femur fracture and underwent ORIF with Dr. Jamar Todd 9/15/19.   Patient was living at [facility] at the time of the fall and was ambulatory with a walker and participated in meals and outings. She transferred to their [facility] following her surgery and has been there up to this hospital admission. She saw Dr. Rita Diaz on 10/2/19 and was progressed to \"TDWB with gradual transition to 50% PWB over the next few weeks\". Per note on 10/8/19, patient has had decreased participation with therapy and decreased motivation. Patient demonstrates generalized weakness and confusion affecting her balance and mobility and would benefit from therapy services during admission to address these deficits. Family's plan is for patient to return to [facility] at [facility] at d/c. Patient did better than anticipated at time of eval.  Min A for bed mobility and sit <> stand transitions with walker. Able to sustain standing but unable to take steps. Patient limited by complaints of dizziness. This section established at most recent assessment   PROBLEM LIST (Impairments causing functional limitations):  1. Decreased Strength  2. Decreased ADL/Functional Activities  3. Decreased Transfer Abilities  4. Decreased Ambulation Ability/Technique  5. Decreased Balance  6. Decreased Activity Tolerance  7. Decreased Knowledge of Precautions  8. Decreased Cognition   INTERVENTIONS PLANNED: (Benefits and precautions of physical therapy have been discussed with the patient.)  1. Balance Exercise  2. Bed Mobility  3. Family Education  4. Gait Training  5. Home Exercise Program (HEP)  6. Therapeutic Activites  7. Therapeutic Exercise/Strengthening     TREATMENT PLAN: Frequency/Duration: daily for duration of hospital stay  Rehabilitation Potential For Stated Goals: Good     REHAB RECOMMENDATIONS (at time of discharge pending progress):    Placement: It is my opinion, based on this patient's performance to date, that Ms. Irwin Andres may benefit from intensive therapy at a [address] [place] [address] after discharge due to the functional deficits listed above that are likely to improve with skilled rehabilitation and concerns that he/she may be unsafe to be unsupervised at home due to weight bearing restrictions and confusion. Equipment:    None at this time              HISTORY:   History of Present Injury/Illness (Reason for Referral):  Patient is a 80 y.o. female who presented to the ED for cc altered mental status. Hx of hypokalemia, HTN, GERD, diastolic CHF EF 38-48%. No family in the room and patient is altered when I walk into the room. Patient does admit to not eating well for the past week. Otherwise, will not answer my questions.      Patient is from [place]   Past Medical History/Comorbidities:   Ms. Ramy Youssef  has a past medical history of Atrial fibrillation ([facility] [address] CAD (coronary artery disease), Chest pain (9/13/2016), Gastrointestinal disorder (irritable bowel), and Hypertension. Ms. Ramy Youssef  has a past surgical history that includes hx cholecystectomy; hx other surgical; and hx orthopaedic. Social History/Living Environment:   Home Environment: [address] Name: [place]  # Steps to Enter: 0  One/Two Story Residence: Other (Comment)  Living Alone: No  Support Systems: Child(hernan), Assisted living  Patient Expects to be Discharged to[de-identified] Skilled nursing facility  Current DME Used/Available at Home: None  Prior Level of Function/Work/Activity:  Ambulating with walker prior to fall.      Number of Personal Factors/Comorbidities that affect the Plan of Care: 1-2: MODERATE COMPLEXITY   EXAMINATION:   Most Recent Physical Functioning:   Gross Assessment:  AROM: Generally decreased, functional  Strength: Generally decreased, functional  Coordination: Generally decreased, functional  Tone: Normal               Posture:     Balance:  Sitting - Static: Good (unsupported)  Sitting - Dynamic: Fair (occasional)  Standing - Static: Constant support  Standing - Dynamic : Constant support Bed Mobility:  Supine to Sit: Minimum assistance  Sit to Supine: Minimum assistance  Scooting: Minimum assistance  Wheelchair Mobility: Transfers:  Sit to Stand: Minimum assistance  Stand to Sit: Minimum assistance  Gait:  Right Side Weight Bearing: Toe touch         Body Structures Involved:  1. Joints  2. Muscles Body Functions Affected:  1. Movement Related Activities and Participation Affected:  1. Mobility   Number of elements that affect the Plan of Care: 4+: HIGH COMPLEXITY   CLINICAL PRESENTATION:   Presentation: Evolving clinical presentation with changing clinical characteristics: MODERATE COMPLEXITY   CLINICAL DECISION MAKIN [facility] Mobility Inpatient Short Form  How much difficulty does the patient currently have. .. Unable A Lot A Little None   1. Turning over in bed (including adjusting bedclothes, sheets and blankets)? ? 1   ? 2   x 3   ? 4   2. Sitting down on and standing up from a chair with arms ( e.g., wheelchair, bedside commode, etc.)   ? 1   ? 2   x 3   ? 4   3. Moving from lying on back to sitting on the side of the bed?   ? 1   ? 2   x 3   ? 4   How much help from another person does the patient currently need. .. Total A Lot A Little None   4. Moving to and from a bed to a chair (including a wheelchair)? ? 1   x 2   ? 3   ? 4   5. Need to walk in hospital room?   x 1   ? 2   ? 3   ? 4   6. Climbing 3-5 steps with a railing?   x 1   ? 2   ? 3   ? 4   © , Trustees of [facility] [employer], under license to [employer]. All rights reserved      Score:  Initial: 13 Most Recent: X (Date: -- )    Interpretation of Tool:  Represents activities that are increasingly more difficult (i.e. Bed mobility, Transfers, Gait). Medical Necessity:     · Patient is expected to demonstrate progress in strength, balance and coordination to decrease assistance required with all mobility. Reason for Services/Other Comments:  · Patient continues to require skilled intervention due to decreased strength and mobility following fall and ORIF with TDWB.    Use of outcome tool(s) and clinical judgement create a POC that gives a: Questionable prediction of patient's progress: MODERATE COMPLEXITY            TREATMENT:   (In addition to Assessment/Re-Assessment sessions the following treatments were rendered)   Pre-treatment Symptoms/Complaints:  Patient agreeable to working with therapy. Pain: Initial:   Pain Intensity 1: 0  Post Session:  0     Therapeutic Activity: (    10 minutets): Therapeutic activities including sit <> stand transitions with standing balance to improve mobility, strength, balance and coordination. Required minimal verbal and manual cues   to promote static and dynamic balance in standing. Braces/Orthotics/Lines/Etc:   · IV  · spicer catheter  · O2 Device: Room air  Treatment/Session Assessment:    · Response to Treatment:  Patient participated well. Moved better than anticipated. Able to stand but unable to take steps yet. · Interdisciplinary Collaboration:   o Physical Therapist  o Registered Nurse  · After treatment position/precautions:   o Supine in bed  o Bed/Chair-wheels locked  o Caregiver at bedside  o Call light within reach  o RN notified   · Compliance with Program/Exercises: Will assess as treatment progresses  · Recommendations/Intent for next treatment session: \"Next visit will focus on advancements to more challenging activities and reduction in assistance provided\".   Total Treatment Duration:  PT Patient Time In/Time Out  Time In: 1555  Time Out: [address], PT

## 2019-10-15 NOTE — PROGRESS NOTES
Dr. Abhay Dye informed that pt. Wants to go back to her room in the nursing home. Pt.'s son and grandson notified concerning her wishes. Pt. Has transfer orders to the floor. EKG monitor, SaO2, BP cuff discontinued.

## 2019-10-16 LAB
ANION GAP SERPL CALC-SCNC: 4 MMOL/L (ref 7–16)
BACTERIA SPEC CULT: ABNORMAL
BASOPHILS # BLD: 0 K/UL (ref 0–0.2)
BASOPHILS NFR BLD: 1 % (ref 0–2)
BUN SERPL-MCNC: 4 MG/DL (ref 8–23)
CALCIUM SERPL-MCNC: 8.7 MG/DL (ref 8.3–10.4)
CHLORIDE SERPL-SCNC: 94 MMOL/L (ref 98–107)
CO2 SERPL-SCNC: 28 MMOL/L (ref 21–32)
CREAT SERPL-MCNC: 0.18 MG/DL (ref 0.6–1)
DIFFERENTIAL METHOD BLD: ABNORMAL
EOSINOPHIL # BLD: 0.1 K/UL (ref 0–0.8)
EOSINOPHIL NFR BLD: 1 % (ref 0.5–7.8)
ERYTHROCYTE [DISTWIDTH] IN BLOOD BY AUTOMATED COUNT: 13.7 % (ref 11.9–14.6)
GLUCOSE SERPL-MCNC: 126 MG/DL (ref 65–100)
HCT VFR BLD AUTO: 31.5 % (ref 35.8–46.3)
HGB BLD-MCNC: 10.9 G/DL (ref 11.7–15.4)
IMM GRANULOCYTES # BLD AUTO: 0.1 K/UL (ref 0–0.5)
IMM GRANULOCYTES NFR BLD AUTO: 1 % (ref 0–5)
INR PPP: 1
LYMPHOCYTES # BLD: 0.6 K/UL (ref 0.5–4.6)
LYMPHOCYTES NFR BLD: 7 % (ref 13–44)
MCH RBC QN AUTO: 31.3 PG (ref 26.1–32.9)
MCHC RBC AUTO-ENTMCNC: 34.6 G/DL (ref 31.4–35)
MCV RBC AUTO: 90.5 FL (ref 79.6–97.8)
MM INDURATION POC: 0 MM (ref 0–5)
MONOCYTES # BLD: 0.6 K/UL (ref 0.1–1.3)
MONOCYTES NFR BLD: 7 % (ref 4–12)
NEUTS SEG # BLD: 6.8 K/UL (ref 1.7–8.2)
NEUTS SEG NFR BLD: 83 % (ref 43–78)
NRBC # BLD: 0 K/UL (ref 0–0.2)
PLATELET # BLD AUTO: 136 K/UL (ref 150–450)
PMV BLD AUTO: 9.4 FL (ref 9.4–12.3)
POTASSIUM SERPL-SCNC: 3.4 MMOL/L (ref 3.5–5.1)
PPD POC: NEGATIVE NEGATIVE
PROTHROMBIN TIME: 13.5 SEC (ref 11.7–14.5)
RBC # BLD AUTO: 3.48 M/UL (ref 4.05–5.2)
SERVICE CMNT-IMP: ABNORMAL
SODIUM SERPL-SCNC: 126 MMOL/L (ref 136–145)
WBC # BLD AUTO: 8.2 K/UL (ref 4.3–11.1)

## 2019-10-16 PROCEDURE — 0T9B70Z DRAINAGE OF BLADDER WITH DRAINAGE DEVICE, VIA NATURAL OR ARTIFICIAL OPENING: ICD-10-PCS | Performed by: HOSPITALIST

## 2019-10-16 PROCEDURE — 36415 COLL VENOUS BLD VENIPUNCTURE: CPT

## 2019-10-16 PROCEDURE — 85025 COMPLETE CBC W/AUTO DIFF WBC: CPT

## 2019-10-16 PROCEDURE — 97530 THERAPEUTIC ACTIVITIES: CPT

## 2019-10-16 PROCEDURE — 02HV33Z INSERTION OF INFUSION DEVICE INTO SUPERIOR VENA CAVA, PERCUTANEOUS APPROACH: ICD-10-PCS | Performed by: HOSPITALIST

## 2019-10-16 PROCEDURE — 74011000258 HC RX REV CODE- 258: Performed by: HOSPITALIST

## 2019-10-16 PROCEDURE — 74011250637 HC RX REV CODE- 250/637: Performed by: FAMILY MEDICINE

## 2019-10-16 PROCEDURE — 36573 INSJ PICC RS&I 5 YR+: CPT | Performed by: HOSPITALIST

## 2019-10-16 PROCEDURE — 74011250636 HC RX REV CODE- 250/636: Performed by: HOSPITALIST

## 2019-10-16 PROCEDURE — 85610 PROTHROMBIN TIME: CPT

## 2019-10-16 PROCEDURE — C1751 CATH, INF, PER/CENT/MIDLINE: HCPCS

## 2019-10-16 PROCEDURE — 74011250637 HC RX REV CODE- 250/637: Performed by: HOSPITALIST

## 2019-10-16 PROCEDURE — 80048 BASIC METABOLIC PNL TOTAL CA: CPT

## 2019-10-16 PROCEDURE — 3E04329 INTRODUCTION OF OTHER ANTI-INFECTIVE INTO CENTRAL VEIN, PERCUTANEOUS APPROACH: ICD-10-PCS | Performed by: HOSPITALIST

## 2019-10-16 PROCEDURE — 65270000029 HC RM PRIVATE

## 2019-10-16 PROCEDURE — 74011250636 HC RX REV CODE- 250/636: Performed by: FAMILY MEDICINE

## 2019-10-16 RX ORDER — POTASSIUM CHLORIDE 20 MEQ/1
40 TABLET, EXTENDED RELEASE ORAL
Status: COMPLETED | OUTPATIENT
Start: 2019-10-16 | End: 2019-10-16

## 2019-10-16 RX ORDER — HEPARIN 100 UNIT/ML
300 SYRINGE INTRAVENOUS AS NEEDED
Status: DISCONTINUED | OUTPATIENT
Start: 2019-10-16 | End: 2019-10-17 | Stop reason: HOSPADM

## 2019-10-16 RX ORDER — SODIUM CHLORIDE 0.9 % (FLUSH) 0.9 %
10 SYRINGE (ML) INJECTION AS NEEDED
Status: DISCONTINUED | OUTPATIENT
Start: 2019-10-16 | End: 2019-10-17 | Stop reason: HOSPADM

## 2019-10-16 RX ORDER — HEPARIN 100 UNIT/ML
300 SYRINGE INTRAVENOUS EVERY 8 HOURS
Status: DISCONTINUED | OUTPATIENT
Start: 2019-10-16 | End: 2019-10-17 | Stop reason: HOSPADM

## 2019-10-16 RX ORDER — SODIUM CHLORIDE 0.9 % (FLUSH) 0.9 %
10 SYRINGE (ML) INJECTION EVERY 8 HOURS
Status: DISCONTINUED | OUTPATIENT
Start: 2019-10-16 | End: 2019-10-17 | Stop reason: HOSPADM

## 2019-10-16 RX ORDER — SODIUM CHLORIDE 9 MG/ML
75 INJECTION, SOLUTION INTRAVENOUS CONTINUOUS
Status: DISCONTINUED | OUTPATIENT
Start: 2019-10-16 | End: 2019-10-17

## 2019-10-16 RX ADMIN — HEPARIN SODIUM 5000 UNITS: 5000 INJECTION INTRAVENOUS; SUBCUTANEOUS at 22:08

## 2019-10-16 RX ADMIN — ASPIRIN 81 MG 81 MG: 81 TABLET ORAL at 09:41

## 2019-10-16 RX ADMIN — MEROPENEM 500 MG: 500 INJECTION, POWDER, FOR SOLUTION INTRAVENOUS at 01:34

## 2019-10-16 RX ADMIN — MEROPENEM 500 MG: 500 INJECTION, POWDER, FOR SOLUTION INTRAVENOUS at 07:55

## 2019-10-16 RX ADMIN — MEROPENEM 500 MG: 500 INJECTION, POWDER, FOR SOLUTION INTRAVENOUS at 16:14

## 2019-10-16 RX ADMIN — Medication 300 UNITS: at 22:09

## 2019-10-16 RX ADMIN — POTASSIUM CHLORIDE 40 MEQ: 1500 TABLET, EXTENDED RELEASE ORAL at 09:41

## 2019-10-16 RX ADMIN — METOPROLOL SUCCINATE 50 MG: 25 TABLET, EXTENDED RELEASE ORAL at 09:41

## 2019-10-16 RX ADMIN — Medication 10 ML: at 22:12

## 2019-10-16 RX ADMIN — SODIUM CHLORIDE 75 ML/HR: 900 INJECTION, SOLUTION INTRAVENOUS at 14:03

## 2019-10-16 RX ADMIN — HEPARIN SODIUM 5000 UNITS: 5000 INJECTION INTRAVENOUS; SUBCUTANEOUS at 09:41

## 2019-10-16 NOTE — PROGRESS NOTES
Problem: Falls - Risk of  Goal: *Absence of Falls  Description  Document Praveen Stade Fall Risk and appropriate interventions in the flowsheet. Outcome: Progressing Towards Goal  Note:   Fall Risk Interventions:  Mobility Interventions: Assess mobility with egress test, Bed/chair exit alarm, Communicate number of staff needed for ambulation/transfer, Patient to call before getting OOB    Mentation Interventions: Adequate sleep, hydration, pain control, Bed/chair exit alarm, Door open when patient unattended, Evaluate medications/consider consulting pharmacy, Eyeglasses and hearing aids    Medication Interventions: Assess postural VS orthostatic hypotension, Bed/chair exit alarm, Patient to call before getting OOB    Elimination Interventions: Bed/chair exit alarm, Call light in reach, Patient to call for help with toileting needs              Problem: Patient Education: Go to Patient Education Activity  Goal: Patient/Family Education  Outcome: Progressing Towards Goal     Problem: Pressure Injury - Risk of  Goal: *Prevention of pressure injury  Description  Document Sergei Scale and appropriate interventions in the flowsheet. Outcome: Progressing Towards Goal  Note:   Pressure Injury Interventions:  Sensory Interventions: Assess changes in LOC, Assess need for specialty bed, Avoid rigorous massage over bony prominences, Check visual cues for pain, Keep linens dry and wrinkle-free, Minimize linen layers, Turn and reposition approx.  every two hours (pillows and wedges if needed), Pressure redistribution bed/mattress (bed type)    Moisture Interventions: Absorbent underpads, Apply protective barrier, creams and emollients, Assess need for specialty bed, Check for incontinence Q2 hours and as needed, Internal/External urinary devices    Activity Interventions: Assess need for specialty bed, Increase time out of bed, PT/OT evaluation    Mobility Interventions: Assess need for specialty bed, HOB 30 degrees or less, Pressure redistribution bed/mattress (bed type), PT/OT evaluation, Turn and reposition approx.  every two hours(pillow and wedges)    Nutrition Interventions: Document food/fluid/supplement intake, Discuss nutritional consult with provider    Friction and Shear Interventions: Apply protective barrier, creams and emollients, Foam dressings/transparent film/skin sealants, HOB 30 degrees or less, Lift sheet, Transferring/repositioning devices                Problem: Patient Education: Go to Patient Education Activity  Goal: Patient/Family Education  Outcome: Progressing Towards Goal     Problem: Nutrition Deficit  Goal: *Optimize nutritional status  Outcome: Progressing Towards Goal     Problem: Patient Education:  Go to Education Activity  Goal: Patient/Family Education  Outcome: Progressing Towards Goal     Problem: Patient Education: Go to Patient Education Activity  Goal: Patient/Family Education  Outcome: Progressing Towards Goal

## 2019-10-16 NOTE — PROGRESS NOTES
Bedside shift change report given to Keith Harrington (oncoming nurse) by Mely Raymond (offgoing nurse). Report included the following information SBAR, Kardex, Intake/Output, MAR, Recent Results and Cardiac Rhythm SR. Pt in bed with eyes closed. Awakens with repeated stimuli. Oriented to person, and place. Pt drowsy and requiring repeated stimulation to stay awake. Lung sounds clear, S1 S2 auscultated. Bowel sounds active in all quadrants. Pulses palpable in all extremities.

## 2019-10-16 NOTE — PROGRESS NOTES
Problem: Mobility Impaired (Adult and Pediatric)  Goal: *Acute Goals and Plan of Care (Insert Text)  Description  STG:  (1.)Ms. Renee Fernández will move from supine to sit and sit to supine  with CONTACT GUARD ASSIST within 5 treatment day(s). (2.)Ms. Renee Fernández will transfer from bed to chair and chair to bed with MINIMAL ASSIST using the least restrictive device within 5 treatment day(s). (3.)Ms. Renee Fernández will ambulate with MINIMAL ASSIST for 5 feet with the least restrictive device within 5 treatment day(s). LTG:  (1.)Ms. Renee Fernández will move from supine to sit and sit to supine  in bed with SUPERVISION within 10 treatment day(s). (2.)Ms. Renee Fernández will transfer from bed to chair and chair to bed with STAND BY ASSIST using the least restrictive device within 10 treatment day(s). (3.)Ms. Renee Fernández will ambulate with CONTACT GUARD ASSIST for 10 feet with the least restrictive device within 10 treatment day(s). ________________________________________________________________________________________________     Outcome: Progressing Towards Goal     PHYSICAL THERAPY: Daily Note and AM 10/16/2019  INPATIENT: PT Visit Days : 2  [place] / Product Type: Medicare /       NAME/AGE/GENDER: Bakari Coronado is a 80 y.o. female   PRIMARY DIAGNOSIS: Hyponatremia [W70.6]  Metabolic encephalopathy [F03.02]  UTI (urinary tract infection) [W06.7] Metabolic encephalopathy   Metabolic encephalopathy         ICD-10: Treatment Diagnosis:    · Generalized Muscle Weakness (M62.81)  · Other lack of cordination (R27.8)  · Difficulty in walking, Not elsewhere classified (R26.2)   Precaution/Allergies:  Sulfa (sulfonamide antibiotics)      ASSESSMENT:     Ms. Renee Fernández presents with above diagnoses. Patient had a fall with R femur fracture and underwent ORIF with Dr. Maurilio Campos 9/15/19.   Patient was living at [facility] long term at the time of the fall and was ambulatory with a walker and participated in meals and outings. She transferred to their [facility] following her surgery and has been there up to this hospital admission. She saw Dr. Maurilio Campos on 10/2/19 and was progressed to \"TDWB with gradual transition to 50% PWB over the next few weeks\". Per note on 10/8/19, patient has had decreased participation with therapy and decreased motivation. Patient demonstrates generalized weakness and confusion affecting her balance and mobility and would benefit from therapy services during admission to address these deficits. Family's plan is for patient to return to [facility] at d/c.  10/16/19:  Pt supine in bed on arrival. She is very lethargic and required sternal rub to wake her. Pt would smile, but would not talk to therapist. Supine to sit with mod assist x2. Pt was not able to maintain sitting balance without support. Her breathing looked labored. Pt able to sit for about 10 minutes. She states she is not hurting anywhere. Sit to supine with mod assist x2. Pt rolled to have sheets straightened underneath her. Pt left supine with needs in reach. Was not able to get pt into the chair today. This section established at most recent assessment   PROBLEM LIST (Impairments causing functional limitations):  1. Decreased Strength  2. Decreased ADL/Functional Activities  3. Decreased Transfer Abilities  4. Decreased Ambulation Ability/Technique  5. Decreased Balance  6. Decreased Activity Tolerance  7. Decreased Knowledge of Precautions  8. Decreased Cognition   INTERVENTIONS PLANNED: (Benefits and precautions of physical therapy have been discussed with the patient.)  1. Balance Exercise  2. Bed Mobility  3. Family Education  4. Gait Training  5. Home Exercise Program (HEP)  6. Therapeutic Activites  7.  Therapeutic Exercise/Strengthening     TREATMENT PLAN: Frequency/Duration: daily for duration of hospital stay  Rehabilitation Potential For Stated Goals: Good     REHAB RECOMMENDATIONS (at time of discharge pending progress):    Placement: It is my opinion, based on this patient's performance to date, that Ms. Ayana uDnaway may benefit from intensive therapy at a [address] after discharge due to the functional deficits listed above that are likely to improve with skilled rehabilitation and concerns that he/she may be unsafe to be unsupervised at home due to weight bearing restrictions and confusion. Equipment:    None at this time              HISTORY:   History of Present Injury/Illness (Reason for Referral):  Patient is a 80 y.o. female who presented to the ED for cc altered mental status. Hx of hypokalemia, HTN, GERD, diastolic CHF EF 20-92%. No family in the room and patient is altered when I walk into the room. Patient does admit to not eating well for the past week. Otherwise, will not answer my questions.      Patient is from [place]   Past Medical History/Comorbidities:   Ms. Ayana Dunaway  has a past medical history of Atrial fibrillation ([facility] [address] CAD (coronary artery disease), Chest pain (9/13/2016), Gastrointestinal disorder (irritable bowel), and Hypertension. Ms. Ayana Dunaway  has a past surgical history that includes hx cholecystectomy; hx other surgical; and hx orthopaedic. Social History/Living Environment:   Home Environment: [address] Name: [place]  # Steps to Enter: 0  One/Two Story Residence: Other (Comment)  Living Alone: No  Support Systems: Child(hernan), Assisted living  Patient Expects to be Discharged to[de-identified] Skilled nursing facility  Current DME Used/Available at Home: None  Prior Level of Function/Work/Activity:  Ambulating with walker prior to fall.      Number of Personal Factors/Comorbidities that affect the Plan of Care: 1-2: MODERATE COMPLEXITY   EXAMINATION:   Most Recent Physical Functioning:   Gross Assessment:                  Posture:     Balance:  Sitting - Static: Fair (occasional)  Sitting - Dynamic: Fair (occasional) Bed Mobility:  Supine to Sit: Moderate assistance;Assist x2  Sit to Supine: Moderate assistance;Assist x2  Wheelchair Mobility:     Transfers:     Gait:  Right Side Weight Bearing: Toe touch         Body Structures Involved:  1. Joints  2. Muscles Body Functions Affected:  1. Movement Related Activities and Participation Affected:  1. Mobility   Number of elements that affect the Plan of Care: 4+: HIGH COMPLEXITY   CLINICAL PRESENTATION:   Presentation: Evolving clinical presentation with changing clinical characteristics: MODERATE COMPLEXITY   CLINICAL DECISION MAKIN [facility] Mobility Inpatient Short Form  How much difficulty does the patient currently have. .. Unable A Lot A Little None   1. Turning over in bed (including adjusting bedclothes, sheets and blankets)? ? 1   ? 2   x 3   ? 4   2. Sitting down on and standing up from a chair with arms ( e.g., wheelchair, bedside commode, etc.)   ? 1   ? 2   x 3   ? 4   3. Moving from lying on back to sitting on the side of the bed?   ? 1   ? 2   x 3   ? 4   How much help from another person does the patient currently need. .. Total A Lot A Little None   4. Moving to and from a bed to a chair (including a wheelchair)? ? 1   x 2   ? 3   ? 4   5. Need to walk in hospital room?   x 1   ? 2   ? 3   ? 4   6. Climbing 3-5 steps with a railing?   x 1   ? 2   ? 3   ? 4   © , Trustees of [address] [place] [address], under license to [employer]. All rights reserved      Score:  Initial: 13 Most Recent: X (Date: -- )    Interpretation of Tool:  Represents activities that are increasingly more difficult (i.e. Bed mobility, Transfers, Gait). Medical Necessity:     · Patient is expected to demonstrate progress in strength, balance and coordination to decrease assistance required with all mobility. Reason for Services/Other Comments:  · Patient continues to require skilled intervention due to decreased strength and mobility following fall and ORIF with TDWB.    Use of outcome tool(s) and clinical judgement create a POC that gives a: Questionable prediction of patient's progress: MODERATE COMPLEXITY            TREATMENT:   (In addition to Assessment/Re-Assessment sessions the following treatments were rendered)   Pre-treatment Symptoms/Complaints:  Patient agreeable to working with therapy. Pain: Initial:      Post Session:  0     Therapeutic Activity: (    10 minutets): Therapeutic activities including sit <> stand transitions with standing balance to improve mobility, strength, balance and coordination. Required minimal verbal and manual cues   to promote static and dynamic balance in standing. Braces/Orthotics/Lines/Etc:   · IV  · spicer catheter  · O2 Device: Room air  Treatment/Session Assessment:    · Response to Treatment:  Patient participated well. Moved better than anticipated. Able to stand but unable to take steps yet. · Interdisciplinary Collaboration:   o Physical Therapist  o Registered Nurse  · After treatment position/precautions:   o Supine in bed  o Bed/Chair-wheels locked  o Caregiver at bedside  o Call light within reach  o RN notified   · Compliance with Program/Exercises: Will assess as treatment progresses  · Recommendations/Intent for next treatment session: \"Next visit will focus on advancements to more challenging activities and reduction in assistance provided\".   Total Treatment Duration:  PT Patient Time In/Time Out  Time In: 1145  Time Out: 1210    Balaji Omid, PTA

## 2019-10-16 NOTE — PROGRESS NOTES
Nutrition Follow Up:  Reason for assessment: BPA - Malnutrition Screening Tool positive for unintended weight loss. Recently Lost Weight Without Trying: No  If Yes, How Much Weight Loss: Unsure  Eating Poorly Due to Decreased Appetite: Yes  Assessment:   Medical History: Admitted with metabolic encephalopathy secondary to hyponatremia and UTI. PMH remarkable for GERD, HTN, CHF, R femoral repair last month. Pt sleeping at time of RD follow up visit; pt difficult to arouse. Per patient's nurse, pt was very lethargic this morning; RN attempted to feed patient her breakfast; 10% breakfast consumed. RN reports patient ate some breakfast 10/15 that was brought in by family. DIET MECHANICAL SOFT  DIET NUTRITIONAL SUPPLEMENTS All Meals; Ensure Enlive      Anthropometrics:Height: 5' 1\" (154.9 cm),  Weight: 64.4 kg (141 lb 14.4 oz), Weight Source: Bed, Body mass index is 26.81 kg/m². BMI class of normal range for Older American Women > 65 years. Weight history per EMR review reveals 139-140# from 2017 through August of 2019. Current weight is greater than historical weights available. Macronutrient needs: 65 kg listed weight  EER:  4544-9023 kcal /day (20-25 kcal/kg)  EPR:  65-81 grams protein/day (1-1.25 grams/kg)    Intake/Comparative Standards: 4 recorded intakes with average intake of 5%. 1 recorded intake of applesauce and pudding. No recorded intake of Ensure Enlive supplement. Pt potentially meets <10%  Estimated kcal and protein needs. Intervention:  Meals and snacks: Continue current diet. Nutrition supplement therapy: Continue Ensure Enlive TID. Coordination of nutrition care: Discussed with Kelly Pineda RN. Discharge Nutrition Plan: Too soon to determine.      Liliane Naranjo, MPH, [address] [place] [address], [place]  [phone]

## 2019-10-16 NOTE — PROGRESS NOTES
Hospitalist Note     Admit Date:  10/12/2019 12:18 PM   Name:  Pauly Landeros   Age:  80 y.o.  :  1927   MRN:  [id number]   PCP:  Kai Francisco MD  Treatment Team: Attending Provider: Gerson See DO; : Joe Riley; Care Manager: Yariel Mott RN    HPI/Subjective:   F/U hyponatremia, UTI     80 y. o. female who presented to the ED for cc altered mental status. Patient has been declining since right femoral repair last month from [facility]. Hx of hypokalemia, HTN, GERD, diastolic CHF EF 40-96%. Patient does admit to not eating well for the past week. Labs- WBC 9.2. UA consistent with UTI, Na 116. K 3. Mg 1.7, Influenza negative on day of admission. Patient admitted to ICU with gentle hydration along with starting Zosyn since urine cultures in the past have grown ESBL sensitive to Zosyn. Urine culture growing gram negative rods. Na improving to 123 today. Urine sodium 81, urine osmolality 168, AM cortisol 21.3. Serum osmolality 232.      Patient is from [place]. 10/16/2019 patient is alert awake oriented at baseline mental status. Overnight nursing staff reported that the patient has vaginal abrasion. No fever no nausea no vomiting no abdominal pain.     Objective:     Patient Vitals for the past 24 hrs:   Temp Pulse Resp BP SpO2   10/16/19 0949    138/80    10/16/19 0749    171/84    10/16/19 0714 98.4 °F (36.9 °C) 78 24 175/77    10/16/19 0107 98.5 °F (36.9 °C) 80 16 166/75 97 %   10/15/19 1918 98.1 °F (36.7 °C) (!) 102 22 134/71 99 %   10/15/19 1614 97.4 °F (36.3 °C) 80 20 123/68      Oxygen Therapy  O2 Sat (%): 97 % (10/16/19 0107)  Pulse via Oximetry: 82 beats per minute (10/15/19 0900)  O2 Device: Room air (10/16/19 0714)      Intake/Output Summary (Last 24 hours) at 10/16/2019 1430  Last data filed at 10/16/2019 0755  Gross per 24 hour   Intake 825.67 ml   Output 2825 ml   Net -1999.33 ml       *Note that automatically entered I/Os may not be accurate; dependent on patient compliance with collection and accurate  by [employer]. General:    Well nourished. Alert. CV:   RRR. No murmur, rub, or gallop. Lungs:   CTAB. No wheezing, rhonchi, or rales. Abdomen:   Soft, nontender, nondistended. Extremities: Warm and dry. No cyanosis or edema. Genitourinary exam in the presence of a female attendant: Minor abrasion in the vaginal area noticed,   Skin:     No rashes or jaundice. Neuro:  No gross focal deficits    Data Review:  I have reviewed all labs, meds, and studies from the last 24 hours:    Recent Results (from the past 24 hour(s))   METABOLIC PANEL, BASIC    Collection Time: 10/16/19  4:05 AM   Result Value Ref Range    Sodium 126 (L) 136 - 145 mmol/L    Potassium 3.4 (L) 3.5 - 5.1 mmol/L    Chloride 94 (L) 98 - 107 mmol/L    CO2 28 21 - 32 mmol/L    Anion gap 4 (L) 7 - 16 mmol/L    Glucose 126 (H) 65 - 100 mg/dL    BUN 4 (L) 8 - 23 MG/DL    Creatinine 0.18 (L) 0.6 - 1.0 MG/DL    GFR est AA >60 >60 ml/min/1.73m2    GFR est non-AA >60 >60 ml/min/1.73m2    Calcium 8.7 8.3 - 10.4 MG/DL   CBC WITH AUTOMATED DIFF    Collection Time: 10/16/19  4:05 AM   Result Value Ref Range    WBC 8.2 4.3 - 11.1 K/uL    RBC 3.48 (L) 4.05 - 5.2 M/uL    HGB 10.9 (L) 11.7 - 15.4 g/dL    HCT 31.5 (L) 35.8 - 46.3 %    MCV 90.5 79.6 - 97.8 FL    MCH 31.3 26.1 - 32.9 PG    MCHC 34.6 31.4 - 35.0 g/dL    RDW 13.7 11.9 - 14.6 %    PLATELET 827 (L) 961 - 450 K/uL    MPV 9.4 9.4 - 12.3 FL    ABSOLUTE NRBC 0.00 0.0 - 0.2 K/uL    DF AUTOMATED      NEUTROPHILS 83 (H) 43 - 78 %    LYMPHOCYTES 7 (L) 13 - 44 %    MONOCYTES 7 4.0 - 12.0 %    EOSINOPHILS 1 0.5 - 7.8 %    BASOPHILS 1 0.0 - 2.0 %    IMMATURE GRANULOCYTES 1 0.0 - 5.0 %    ABS. NEUTROPHILS 6.8 1.7 - 8.2 K/UL    ABS. LYMPHOCYTES 0.6 0.5 - 4.6 K/UL    ABS. MONOCYTES 0.6 0.1 - 1.3 K/UL    ABS. EOSINOPHILS 0.1 0.0 - 0.8 K/UL    ABS. BASOPHILS 0.0 0.0 - 0.2 K/UL    ABS. IMM.  GRANS. 0.1 0.0 - 0.5 K/UL   PLEASE READ & DOCUMENT PPD TEST IN 48 HRS    Collection Time: 10/16/19 10:17 AM   Result Value Ref Range    PPD Negative Negative    mm Induration 0 0 - 5 mm        All Micro Results     Procedure Component Value Units Date/Time    CULTURE, BLOOD [[id number]] Collected:  10/14/19 0940    Order Status:  Completed Specimen:  Blood Updated:  10/16/19 1128     Special Requests: --        LEFT  HAND       Culture result: NO GROWTH 2 DAYS       CULTURE, BLOOD [[id number]] Collected:  10/14/19 0945    Order Status:  Completed Specimen:  Blood Updated:  10/16/19 1128     Special Requests: --        RIGHT  HAND       Culture result: NO GROWTH 2 DAYS       CULTURE, URINE [[id number]]  (Abnormal)  (Susceptibility) Collected:  10/12/19 1335    Order Status:  Completed Specimen:  Urine from Clean catch Updated:  10/16/19 0930     Special Requests: NO SPECIAL REQUESTS        Culture result:       >100,000 COLONIES/mL ESCHERICHIA COLI ** (EXTENDED SPECTRUM BETA LACTAMASE ) **                  ** MULTI-DRUG RESISTANT ORGANISM **                  RESULTS VERIFIED, PHONED TO AND READ BACK BY  Batsheva Mccoy RN ON 10/14/19 @0837, ADS            No results found for this visit on 10/12/19.     Current Meds:  Current Facility-Administered Medications   Medication Dose Route Frequency    0.9% sodium chloride infusion  75 mL/hr IntraVENous CONTINUOUS    heparin (porcine) injection 5,000 Units  5,000 Units SubCUTAneous Q12H    metoprolol (LOPRESSOR) injection 5 mg  5 mg IntraVENous Q6H PRN    metoprolol succinate (TOPROL-XL) XL tablet 50 mg  50 mg Oral DAILY    meropenem (MERREM) 500 mg in 0.9% sodium chloride (MBP/ADV) 50 mL  0.5 g IntraVENous Q8H    traMADol (ULTRAM) tablet 50 mg  50 mg Oral Q6H PRN    acetaminophen (TYLENOL) tablet 650 mg  650 mg Oral Q4H PRN    naloxone (NARCAN) injection 0.4 mg  0.4 mg IntraVENous PRN    ondansetron (ZOFRAN) injection 4 mg  4 mg IntraVENous Q4H PRN    polyethylene glycol (MIRALAX) packet 17 g  17 g Oral DAILY PRN    aspirin chewable tablet 81 mg  81 mg Oral DAILY    albuterol (PROVENTIL VENTOLIN) nebulizer solution 2.5 mg  2.5 mg Nebulization Q6H PRN       Other Studies (last 24 hours):  No results found. Assessment and Plan:     Hospital Problems as of 10/16/2019 Date Reviewed: 9/13/2016          Codes Class Noted - Resolved POA    * (Principal) Metabolic encephalopathy F-37-GK: G93.41  ICD-9-CM: 348.31  10/12/2019 - Present Unknown        UTI (urinary tract infection) ICD-10-CM: N39.0  ICD-9-CM: 599.0  10/12/2019 - Present Unknown        Hyponatremia ICD-10-CM: E87.1  ICD-9-CM: 276.1  2/9/2018 - Present Unknown        DNR (do not resuscitate) (Chronic) ICD-10-CM: M73  ICD-9-CM: V49.86  9/11/2016 - Present Yes        HTN (hypertension) (Chronic) ICD-10-CM: I10  ICD-9-CM: 401.9  8/18/2016 - Present Yes              Plan: This is a 26-year-old female with:    Acute Metabolic encephalopathy secondary to hyponatremia and UTI. Tx as below-resolved to baseline     Hyponatremia - Decrease dose of Cymbalta since can cause SIADH in the elderly but want to avoid withdrawal.   Holding Bumetanide and lasix. Suspecting hyponatremia from dehydration. She was on IV hydration with IV fluids which were stopped yesterday after sodium corrected to 129. This morning sodium was 126 and therefore started gentle hydration  Monitor BMP. Avoid overcorrection no more than 6 units in 24 hours to avoid demyelination.      ESBL E. coli urinary tract infection - urine cultures in the past have grown ESBL. Meropenem day 2/7, needs PICC line for IV antibiotics. HTN- amlodipine 5mg daily, Lisinopril 40mg daily      Hypokalemia - repleted PO     Hypomagnesemia - normal     Minor vaginal abrasion most likely from Celeste trauma. Celeste catheter was discontinued today       ECHO EF 60-65%, no wall motion abnormality and mild diastolic dysfunction grade 1 in 2018. Mild aortic stenosis seen.  Monitor for signs of volume overload since holding diuretics and giving gentle hydration. DC planning/Dispo: Patient to be transferred to medical floor today if bed available. PT eval.   Patient will need to be discharged to [facility] ([facility] skilled nursing facility when medically cleared anticipate in 24 hours.     Diet:  DIET MECHANICAL SOFT  DIET NUTRITIONAL SUPPLEMENTS  DVT ppx: heparin    Signed:  Rambo Lyman MD

## 2019-10-16 NOTE — PROGRESS NOTES
Celeste catheter removed. Pt perineal excoriation improved. External female catheter placed and connected to suction.

## 2019-10-16 NOTE — PROGRESS NOTES
Pt lethargic and difficult to arouse to take medications. Requiring repeated stimuli and encouragement to keep pt awake to swallow pills.

## 2019-10-16 NOTE — INTERDISCIPLINARY ROUNDS
Interdisciplinary team rounds were held 10/16/2019 with the following team members:Care Management, Nursing, Pastoral Care, Physical Therapy and Physician and the patient. Plan of care discussed. See clinical pathway and/or care plan for interventions and desired outcomes.

## 2019-10-17 VITALS
RESPIRATION RATE: 18 BRPM | BODY MASS INDEX: 26.07 KG/M2 | DIASTOLIC BLOOD PRESSURE: 77 MMHG | HEART RATE: 72 BPM | HEIGHT: 61 IN | WEIGHT: 138.1 LBS | SYSTOLIC BLOOD PRESSURE: 164 MMHG | OXYGEN SATURATION: 97 % | TEMPERATURE: 97.6 F

## 2019-10-17 LAB
ANION GAP SERPL CALC-SCNC: 7 MMOL/L (ref 7–16)
BUN SERPL-MCNC: 7 MG/DL (ref 8–23)
CALCIUM SERPL-MCNC: 8.5 MG/DL (ref 8.3–10.4)
CHLORIDE SERPL-SCNC: 97 MMOL/L (ref 98–107)
CO2 SERPL-SCNC: 26 MMOL/L (ref 21–32)
CREAT SERPL-MCNC: 0.26 MG/DL (ref 0.6–1)
GLUCOSE SERPL-MCNC: 156 MG/DL (ref 65–100)
POTASSIUM SERPL-SCNC: 3.7 MMOL/L (ref 3.5–5.1)
SODIUM SERPL-SCNC: 130 MMOL/L (ref 136–145)

## 2019-10-17 PROCEDURE — 77030020263 HC SOL INJ SOD CL0.9% LFCR 1000ML

## 2019-10-17 PROCEDURE — 74011000258 HC RX REV CODE- 258: Performed by: HOSPITALIST

## 2019-10-17 PROCEDURE — 74011250636 HC RX REV CODE- 250/636: Performed by: HOSPITALIST

## 2019-10-17 PROCEDURE — 80048 BASIC METABOLIC PNL TOTAL CA: CPT

## 2019-10-17 PROCEDURE — 74011250637 HC RX REV CODE- 250/637: Performed by: HOSPITALIST

## 2019-10-17 PROCEDURE — 74011250636 HC RX REV CODE- 250/636: Performed by: FAMILY MEDICINE

## 2019-10-17 PROCEDURE — 74011250637 HC RX REV CODE- 250/637: Performed by: FAMILY MEDICINE

## 2019-10-17 RX ORDER — AMLODIPINE BESYLATE 5 MG/1
5 TABLET ORAL DAILY
Status: DISCONTINUED | OUTPATIENT
Start: 2019-10-17 | End: 2019-10-17 | Stop reason: HOSPADM

## 2019-10-17 RX ADMIN — AMLODIPINE BESYLATE 5 MG: 5 TABLET ORAL at 11:41

## 2019-10-17 RX ADMIN — MEROPENEM 500 MG: 500 INJECTION, POWDER, FOR SOLUTION INTRAVENOUS at 07:52

## 2019-10-17 RX ADMIN — HEPARIN SODIUM 5000 UNITS: 5000 INJECTION INTRAVENOUS; SUBCUTANEOUS at 09:52

## 2019-10-17 RX ADMIN — Medication 300 UNITS: at 05:42

## 2019-10-17 RX ADMIN — MEROPENEM 500 MG: 500 INJECTION, POWDER, FOR SOLUTION INTRAVENOUS at 00:13

## 2019-10-17 RX ADMIN — Medication 300 UNITS: at 11:42

## 2019-10-17 RX ADMIN — METOPROLOL SUCCINATE 50 MG: 25 TABLET, EXTENDED RELEASE ORAL at 09:52

## 2019-10-17 RX ADMIN — ASPIRIN 81 MG 81 MG: 81 TABLET ORAL at 09:52

## 2019-10-17 RX ADMIN — SODIUM CHLORIDE 75 ML/HR: 900 INJECTION, SOLUTION INTRAVENOUS at 05:00

## 2019-10-17 RX ADMIN — Medication 10 ML: at 05:02

## 2019-10-17 NOTE — INTERDISCIPLINARY ROUNDS
Interdisciplinary team rounds were held 10/17/2019 with the following team members:Care Management, Nursing, Pastoral Care, Physical Therapy and Physician and the patient. Plan of care discussed. See clinical pathway and/or care plan for interventions and desired outcomes.

## 2019-10-17 NOTE — PROGRESS NOTES
Report called to [facility] at [address] for discharge of pt and transfer to [facility].

## 2019-10-17 NOTE — PROGRESS NOTES
Care Management Interventions  PCP Verified by CM: Yes  Mode of Transport at Discharge: BLS  Transition of Care Consult (CM Consult): SNF  Partner SNF: Yes  Physical Therapy Consult: Yes  Occupational Therapy Consult: Yes  Current Support Network: Assisted Living([facility])  Confirm Follow Up Transport: Family  Plan discussed with Pt/Family/Caregiver: Yes  Freedom of Choice Offered: Yes  Discharge Location  Discharge Placement: Skilled nursing facility([facility])  Pt is ready for d/c to [facility]F, Rm #[phone] B  , Patient and family aware, RN given # to call report. [facility] scheduled for  @3794 . Packet on desk.

## 2019-10-17 NOTE — PROGRESS NOTES
Late entry due to hands on patient care: Report received. Chart reviewed. Patient awake and alert. Oriented X 2. Speech clear. Calm and cooperative. Denies pain. Smiles. Call bell within reach. Instructed to call nursing for any needs.

## 2019-10-17 NOTE — DISCHARGE SUMMARY
Hospitalist Discharge Summary     Admit Date:  10/12/2019 12:18 PM   Name:  Robbie Adamson   Age:  80 y.o.  :  1927   MRN:  [id number]   PCP:  Mariana Lin MD  Treatment Team: Attending Provider: Lena Cobb DO; : Karon Ford; Care Manager: Letty Helms, RN; Physical Therapist: Mika Brown PT; Primary Nurse: Sherri Jeff    Problem List for this Hospitalization:  Hospital Problems as of 10/17/2019 Date Reviewed: 2016          Codes Class Noted - Resolved POA    * (Principal) Metabolic encephalopathy [id number]: G93.41  ICD-9-CM: 348.31  10/12/2019 - Present Unknown        UTI (urinary tract infection) ICD-10-CM: N39.0  ICD-9-CM: 599.0  10/12/2019 - Present Unknown        Hyponatremia ICD-10-CM: E87.1  ICD-9-CM: 276.1  2018 - Present Unknown        DNR (do not resuscitate) (Chronic) ICD-10-CM: S28  ICD-9-CM: V49.86  2016 - Present Yes        HTN (hypertension) (Chronic) ICD-10-CM: I10  ICD-9-CM: 401.9  2016 - Present Yes                Admission HPI from 10/12/2019:    Patient is a 80 y.o. female who presented to the ED for cc altered mental status. Hx of hypokalemia, HTN, GERD, diastolic CHF EF 87-12%. No family in the room and patient is altered when I walk into the room. Patient does admit to not eating well for the past week. Otherwise, will not answer my questions.      Patient is from [address] [place] [address] - stable     Labs- WBC 9.2. UA consistent with UTI, Na 116. K 3. Mg 1.7. Influenza negative. Hospital Course:  80 y. o. female who presented to the ED for cc altered mental status. Patient has been declining since right femoral repair last month from [facility]. Hx of hypokalemia, HTN, GERD, diastolic CHF EF 58-76%. Patient does admit to not eating well for the past week. Labs- WBC 9.2. UA consistent with UTI, Na 116. K 3. Mg 1.7, Influenza negative on day of admission.  Patient admitted to ICU for symptomatic hyponatremia, hypovolemic present on admission from poor p.o. intake. Patient was gently hydrated with IV fluids and sodium has gradually improved. She also has ESBL E. coli urinary tract infection present on admission. Initially patient was on Zosyn and subsequently switched to meropenem on 10/15/2019. Patient had PICC line placed on 10/16/2019 and plan to continue IV meropenem for a total of 1 week of antibiotics. Na steadily improved to 130 today. Urine sodium 81, urine osmolality 168, AM cortisol 21.3. Serum osmolality 232. Patient's mental status has significantly improved to baseline. Patient is being discharged to [facility] skilled nursing facility today. The problems were addressed as follows:    Acute Metabolic encephalopathy secondary to hyponatremia and UTI. Tx as below-resolved to baseline     Hyponatremia - Decrease dose of Cymbalta since can cause SIADH in the elderly but want to avoid withdrawal.   Holding Bumetanide and lasix. Suspecting hyponatremia from dehydration. Sodium is 130 this morning.      ESBL E. coli urinary tract infection - urine cultures in the past have grown ESBL. Meropenem day 3/7,  PICC line placed on 10/16/2019 for IV antibiotics.     HTN- amlodipine 5mg daily,   Lisinopril was held because of hyponatremia.     Hypokalemia - resolved.      Hypomagnesemia - normal      Minor vaginal abrasion most likely from Celeste trauma. Celeste catheter was discontinued today       ECHO EF 60-65%, no wall motion abnormality and mild diastolic dysfunction grade 1 in 2018. Mild aortic stenosis seen. Monitor for signs of volume overload since holding diuretics and giving gentle hydration    Disposition: Skilled Nursing Facility  Activity: As tolerated per facility protocol  Diet: DIET MECHANICAL SOFT  DIET NUTRITIONAL SUPPLEMENTS All Meals; Ensure Enlive    Follow up instructions, discharge meds at bottom of this note. Plan was discussed with the pt. All questions answered.   Patient was stable at time of discharge. Patient will call a physician or return if any concerns. Diagnostic Imaging/Tests:   No results found. Echocardiogram results:  No results found for this visit on 10/12/19. Procedures done this admission:  * No surgery found *    All Micro Results     Procedure Component Value Units Date/Time    CULTURE, BLOOD [[id number]] Collected:  10/14/19 0940    Order Status:  Completed Specimen:  Blood Updated:  10/17/19 0705     Special Requests: --        LEFT  HAND       Culture result: NO GROWTH 3 DAYS       CULTURE, BLOOD [[id number]] Collected:  10/14/19 0945    Order Status:  Completed Specimen:  Blood Updated:  10/17/19 0705     Special Requests: --        RIGHT  HAND       Culture result: NO GROWTH 3 DAYS       CULTURE, URINE [[id number]]  (Abnormal)  (Susceptibility) Collected:  10/12/19 1335    Order Status:  Completed Specimen:  Urine from Clean catch Updated:  10/16/19 0930     Special Requests: NO SPECIAL REQUESTS        Culture result:       >100,000 COLONIES/mL ESCHERICHIA COLI ** (EXTENDED SPECTRUM BETA LACTAMASE ) **                  ** MULTI-DRUG RESISTANT ORGANISM **                  RESULTS VERIFIED, PHONED TO AND READ BACK BY  Maddie Archer RN ON 10/14/19 @0837, ADS            Labs: Results:       BMP, Mg, Phos Recent Labs     10/17/19  0339 10/16/19  0405 10/15/19  1152   * 126* 129*   K 3.7 3.4* 3.6   CL 97* 94* 94*   CO2 26 28 28   AGAP 7 4* 7   BUN 7* 4* 4*   CREA 0.26* 0.18* 0.23*   CA 8.5 8.7 8.2*   * 126* 132*      CBC Recent Labs     10/16/19  0405 10/15/19  0543   WBC 8.2 11.4*   RBC 3.48* 3.34*   HGB 10.9* 10.5*   HCT 31.5* 30.7*   * 116*   GRANS 83*  --    LYMPH 7*  --    EOS 1  --    MONOS 7  --    BASOS 1  --    IG 1  --    ANEU 6.8  --    ABL 0.6  --    AMADA 0.1  --    ABM 0.6  --    ABB 0.0  --    AIG 0.1  --       LFT No results for input(s): SGOT, ALT, TBIL, AP, TP, ALB, GLOB, AGRAT, GPT in the last 72 hours.    Cardiac Testing Lab Results   Component Value Date/Time     02/08/2018 11:15 PM    BNP 76 10/04/2016 05:30 PM    BNP 38 08/21/2016 05:43 AM    BNP 35 08/15/2016 01:14 PM    Troponin-I, Qt. <0.04 09/11/2016 12:00 AM    Troponin-I, Qt. <0.04 09/10/2016 06:05 PM    Troponin-I, Qt. <0.04 08/18/2016 06:35 PM      Coagulation Tests Lab Results   Component Value Date/Time    Prothrombin time 13.5 10/16/2019 03:38 PM    Prothrombin time 10.5 08/15/2017 12:15 PM    INR 1.0 10/16/2019 03:38 PM    INR 1.0 08/15/2017 12:15 PM      A1c No results found for: HBA1C, HGBE8, [id number]   Lipid Panel No results found for: CHOL, CHOLPOCT, CHOLX, CHLST, CHOLV, 585651, HDL, HDLP, LDL, LDLC, DLDLP, 942127, VLDLC, VLDL, TGLX, TRIGL, TRIGP, TGLPOCT, CHHD, CHHDX   Thyroid Panel No results found for: TSH, T4, FT4, TT3, T3U, TSHEXT     Most Recent UA Lab Results   Component Value Date/Time    Color YELLOW 10/12/2019 01:35 PM    Appearance TURBID 10/12/2019 01:35 PM    Specific gravity 1.007 10/12/2019 01:35 PM    pH (UA) 7.0 10/12/2019 01:35 PM    Protein TRACE (A) 10/12/2019 01:35 PM    Glucose NEGATIVE  10/12/2019 01:35 PM    Ketone NEGATIVE  10/12/2019 01:35 PM    Bilirubin NEGATIVE  10/12/2019 01:35 PM    Blood SMALL (A) 10/12/2019 01:35 PM    Urobilinogen 1.0 10/12/2019 01:35 PM    Nitrites NEGATIVE  10/12/2019 01:35 PM    Leukocyte Esterase LARGE (A) 10/12/2019 01:35 PM    WBC >100 (H) 10/12/2019 01:35 PM    RBC 3-5 10/12/2019 01:35 PM    Epithelial cells 0-3 10/12/2019 01:35 PM    Bacteria 4+ (H) 10/12/2019 01:35 PM    Casts 0-3 10/12/2019 01:35 PM    Crystals, urine 0 05/30/2010 08:00 PM    Mucus 0 05/30/2010 08:00 PM        Allergies   Allergen Reactions    Sulfa (Sulfonamide Antibiotics) Not Reported This Time     Pt denies allergy     Immunization History   Administered Date(s) Administered    TB Skin Test (PPD) Intradermal 08/20/2016, 02/11/2018, 10/14/2019       All Labs from Last 24 Hrs:  Recent Results (from the past 24 hour(s))   PLEASE READ & DOCUMENT PPD TEST IN 48 HRS    Collection Time: 10/16/19 10:17 AM   Result Value Ref Range    PPD Negative Negative    mm Induration 0 0 - 5 mm   PROTHROMBIN TIME + INR    Collection Time: 10/16/19  3:38 PM   Result Value Ref Range    Prothrombin time 13.5 11.7 - 14.5 sec    INR 1.0     METABOLIC PANEL, BASIC    Collection Time: 10/17/19  3:39 AM   Result Value Ref Range    Sodium 130 (L) 136 - 145 mmol/L    Potassium 3.7 3.5 - 5.1 mmol/L    Chloride 97 (L) 98 - 107 mmol/L    CO2 26 21 - 32 mmol/L    Anion gap 7 7 - 16 mmol/L    Glucose 156 (H) 65 - 100 mg/dL    BUN 7 (L) 8 - 23 MG/DL    Creatinine 0.26 (L) 0.6 - 1.0 MG/DL    GFR est AA >60 >60 ml/min/1.73m2    GFR est non-AA >60 >60 ml/min/1.73m2    Calcium 8.5 8.3 - 10.4 MG/DL       Current Med List in Hospital:   Current Facility-Administered Medications   Medication Dose Route Frequency    amLODIPine (NORVASC) tablet 5 mg  5 mg Oral DAILY    sodium chloride (NS) flush 10 mL  10 mL InterCATHeter Q8H    heparin (porcine) pf 300 Units  300 Units InterCATHeter Q8H    sodium chloride (NS) flush 10 mL  10 mL InterCATHeter PRN    heparin (porcine) pf 300 Units  300 Units InterCATHeter PRN    heparin (porcine) injection 5,000 Units  5,000 Units SubCUTAneous Q12H    metoprolol (LOPRESSOR) injection 5 mg  5 mg IntraVENous Q6H PRN    metoprolol succinate (TOPROL-XL) XL tablet 50 mg  50 mg Oral DAILY    meropenem (MERREM) 500 mg in 0.9% sodium chloride (MBP/ADV) 50 mL  0.5 g IntraVENous Q8H    traMADol (ULTRAM) tablet 50 mg  50 mg Oral Q6H PRN    acetaminophen (TYLENOL) tablet 650 mg  650 mg Oral Q4H PRN    naloxone (NARCAN) injection 0.4 mg  0.4 mg IntraVENous PRN    ondansetron (ZOFRAN) injection 4 mg  4 mg IntraVENous Q4H PRN    polyethylene glycol (MIRALAX) packet 17 g  17 g Oral DAILY PRN    aspirin chewable tablet 81 mg  81 mg Oral DAILY    albuterol (PROVENTIL VENTOLIN) nebulizer solution 2.5 mg  2.5 mg Nebulization Q6H PRN       Discharge Exam:  Patient Vitals for the past 24 hrs:   Temp Pulse Resp BP SpO2   10/17/19 0751 97.6 °F (36.4 °C) 78 16 156/82 96 %   10/17/19 0723 97.6 °F (36.4 °C)       10/17/19 0341 98.4 °F (36.9 °C)   179/73    10/16/19 2352 98.1 °F (36.7 °C) 88 15 163/84    10/16/19 1926 99.2 °F (37.3 °C) 78 21 149/74 96 %   10/16/19 1619 98 °F (36.7 °C) 76 16 148/69    10/16/19 1226 98.6 °F (37 °C) 92 16 155/77    10/16/19 0949    138/80      Oxygen Therapy  O2 Sat (%): 96 % (10/17/19 0751)  Pulse via Oximetry: 92 beats per minute (10/16/19 1226)  O2 Device: Room air (10/17/19 0751)    Intake/Output Summary (Last 24 hours) at 10/17/2019 0914  Last data filed at 10/17/2019 0903  Gross per 24 hour   Intake 1809 ml   Output 500 ml   Net 1309 ml       *Note that automatically entered I/Os may not be accurate; dependent on patient compliance with collection and accurate  by assistants. General:    Well nourished. Alert. Eyes:   Normal sclerae. Extraocular movements intact. ENT:  Normocephalic, atraumatic. Moist mucous membranes  CV:   Regular rate and rhythm. No murmur, rub, or gallop. Lungs:  Clear to auscultation bilaterally. No wheezing, rhonchi, or rales. Abdomen: Soft, nontender, nondistended. Bowel sounds normal.   Extremities: Warm and dry. No cyanosis or edema. Neurologic: CN II-XII grossly intact. Sensation intact. Skin:     No rashes or jaundice. Psych:  Normal mood and affect. Discharge Info:   Current Discharge Medication List      START taking these medications    Details   meropenem 500 mg, ADDaptor 1 Device IVPB 500 mg by IntraVENous route every eight (8) hours for 5 days. Qty: 15 Dose, Refills: 0         CONTINUE these medications which have NOT CHANGED    Details   furosemide (LASIX) 20 mg tablet Take 1 Tab by mouth daily.  Indications: Pulmonary Edema due to Chronic Heart Failure  Qty: 30 Tab, Refills: 0      potassium chloride (KAON 10%) 20 mEq/15 mL solution Take 15 mL by mouth daily. Indications: hypokalemia prevention  Qty: 480 mL, Refills: 0      polyethylene glycol (MIRALAX) 17 gram/dose powder Take 17 g by mouth daily. 1 tablespoon with 8 oz of water daily  Qty: 238 g, Refills: 0      linaclotide (LINZESS) 145 mcg cap capsule Take 145 mcg by mouth Daily (before breakfast). amLODIPine (NORVASC) 5 mg tablet Take 5 mg by mouth daily. esomeprazole (NEXIUM) 40 mg capsule Take  by mouth daily. metoprolol succinate (TOPROL-XL) 50 mg XL tablet Take 1 Tab by mouth daily. Indications: HYPERTENSION  Qty: 30 Tab, Refills: 6    Associated Diagnoses: Essential hypertension with goal blood pressure less than 140/90      aspirin delayed-release 81 mg tablet Take 325 mg by mouth daily. VIT C/YASMINE AC/LUT/COPPER/ZNOX (PRESERVISION LUTEIN PO) Take  by mouth. STOP taking these medications       benazepril (LOTENSIN) 20 mg tablet Comments:   Reason for Stopping: Follow Up Orders: Follow-up Appointments   Procedures    FOLLOW UP VISIT Appointment in: 3 - 5 Days F/U WITH PCP IN 3-5 DAYS     F/U WITH PCP IN 3-5 DAYS     Standing Status:   Standing     Number of Occurrences:   1     Order Specific Question:   Appointment in     Answer:   3 - 5 Days       Follow-up Information     Follow up With Specialties Details Why [address] [facility] [address]   [address] [facility] [address] [address], [address] [place] Raul Vincent [address]  [phone]            Time spent in patient discharge planning and coordination 42 minutes.     Signed:  Kerri Villa MD

## 2019-10-17 NOTE — PROGRESS NOTES
Bedside shift change report given to Keith Harrington (oncoming nurse) by Kain Hernandez (offgoing nurse). Report included the following information SBAR, Kardex, Intake/Output, MAR, Recent Results and Cardiac Rhythm SR. Pt resting in bed with eyes closed. Pt awakes to voice. Pt oriented to person and place. Lung fields clear, S1 S2 auscultated. Bowel sounds active in all quadrants. Purewick in place, connected to suction. Pulses palpable in all extremities.

## 2019-10-17 NOTE — PROGRESS NOTES
PICC Placement Note    PRE-PROCEDURE VERIFICATION  Correct Procedure: yes. Time out completed with assistant Dagoberto Morgan RN, VAT and all persons present in agreement with time out. Correct Site:  yes  Temperature: Temp: 98 °F (36.7 °C), Temperature Source: Temp Source: Axillary  Recent Labs     10/16/19  1538 10/16/19  0405   BUN  --  4*   CREA  --  0.18*   PLT  --  136*   INR 1.0  --    WBC  --  8.2     Allergies: Sulfa (sulfonamide antibiotics)  Education materials for [facility] given to patient or family. PROCEDURE DETAIL  A single lumen PICC line was started for antibiotic therapy. The following documentation is in addition to the PICC properties in the lines/airways flowsheet :  Lot #: [id number]   xylocaine used: yes  Mid-Arm Circumference: 26 (cm)  Internal Catheter Length: 40 (cm)  Internal Catheter Total Length: 40 (cm)  Vein Selection for PICC:right basilic  Central Line Bundle followed yes  Complication Related to Insertion: none  Both the insertion guidewire and ECG guidewire were removed intact all ports have positive blood return and were flush well with normal saline. The location of the tip of the PICC is verified using ECG technology. The tip is in the SVC per ECG reading. See image below.      Line is okay to use: yes    Tahira Kim RN, VAT

## 2019-10-20 LAB
BACTERIA SPEC CULT: NORMAL
BACTERIA SPEC CULT: NORMAL
SERVICE CMNT-IMP: NORMAL
SERVICE CMNT-IMP: NORMAL

## 2020-08-26 ENCOUNTER — HOSPITAL ENCOUNTER (OUTPATIENT)
Dept: LAB | Age: 85
Discharge: HOME | End: 2020-08-26

## 2020-08-26 LAB — BNP SERPL-MCNC: 171 PG/ML

## 2020-08-26 PROCEDURE — 83880 ASSAY OF NATRIURETIC PEPTIDE: CPT

## 2021-06-04 ENCOUNTER — APPOINTMENT (OUTPATIENT)
Dept: CT IMAGING | Age: 86
End: 2021-06-04
Attending: EMERGENCY MEDICINE
Payer: MEDICARE

## 2021-06-04 ENCOUNTER — HOSPITAL ENCOUNTER (EMERGENCY)
Age: 86
Discharge: HOME | End: 2021-06-04
Attending: EMERGENCY MEDICINE
Payer: MEDICARE

## 2021-06-04 VITALS
HEIGHT: 61 IN | SYSTOLIC BLOOD PRESSURE: 174 MMHG | RESPIRATION RATE: 16 BRPM | HEART RATE: 74 BPM | DIASTOLIC BLOOD PRESSURE: 74 MMHG | TEMPERATURE: 98.6 F | OXYGEN SATURATION: 95 % | BODY MASS INDEX: 26.43 KG/M2 | WEIGHT: 140 LBS

## 2021-06-04 DIAGNOSIS — R10.9 FLANK PAIN: Primary | ICD-10-CM

## 2021-06-04 LAB
ALBUMIN SERPL-MCNC: 3.6 G/DL (ref 3.2–4.6)
ALBUMIN/GLOB SERPL: 0.9 {RATIO} (ref 1.2–3.5)
ALP SERPL-CCNC: 92 U/L (ref 50–130)
ALT SERPL-CCNC: 24 U/L (ref 12–65)
ANION GAP SERPL CALC-SCNC: 5 MMOL/L (ref 7–16)
APPEARANCE UR: CLEAR
AST SERPL-CCNC: 32 U/L (ref 15–37)
BACTERIA URNS QL MICRO: 0 /HPF
BASOPHILS # BLD: 0.1 K/UL (ref 0–0.2)
BASOPHILS NFR BLD: 1 % (ref 0–2)
BILIRUB SERPL-MCNC: 0.7 MG/DL (ref 0.2–1.1)
BILIRUB UR QL: NEGATIVE
BUN SERPL-MCNC: 11 MG/DL (ref 8–23)
CALCIUM SERPL-MCNC: 9.3 MG/DL (ref 8.3–10.4)
CHLORIDE SERPL-SCNC: 102 MMOL/L (ref 98–107)
CO2 SERPL-SCNC: 29 MMOL/L (ref 21–32)
COLOR UR: YELLOW
CREAT SERPL-MCNC: 0.4 MG/DL (ref 0.6–1)
DIFFERENTIAL METHOD BLD: NORMAL
EOSINOPHIL # BLD: 0.1 K/UL (ref 0–0.8)
EOSINOPHIL NFR BLD: 2 % (ref 0.5–7.8)
EPI CELLS #/AREA URNS HPF: ABNORMAL /HPF
ERYTHROCYTE [DISTWIDTH] IN BLOOD BY AUTOMATED COUNT: 12.8 % (ref 11.9–14.6)
GLOBULIN SER CALC-MCNC: 3.9 G/DL (ref 2.3–3.5)
GLUCOSE SERPL-MCNC: 117 MG/DL (ref 65–100)
GLUCOSE UR STRIP.AUTO-MCNC: NEGATIVE MG/DL
HCT VFR BLD AUTO: 40.8 % (ref 35.8–46.3)
HGB BLD-MCNC: 14 G/DL (ref 11.7–15.4)
HGB UR QL STRIP: ABNORMAL
IMM GRANULOCYTES # BLD AUTO: 0 K/UL (ref 0–0.5)
IMM GRANULOCYTES NFR BLD AUTO: 0 % (ref 0–5)
KETONES UR QL STRIP.AUTO: NEGATIVE MG/DL
LEUKOCYTE ESTERASE UR QL STRIP.AUTO: NEGATIVE
LIPASE SERPL-CCNC: 85 U/L (ref 73–393)
LYMPHOCYTES # BLD: 1.3 K/UL (ref 0.5–4.6)
LYMPHOCYTES NFR BLD: 21 % (ref 13–44)
MAGNESIUM SERPL-MCNC: 2.2 MG/DL (ref 1.8–2.4)
MCH RBC QN AUTO: 31.3 PG (ref 26.1–32.9)
MCHC RBC AUTO-ENTMCNC: 34.3 G/DL (ref 31.4–35)
MCV RBC AUTO: 91.1 FL (ref 79.6–97.8)
MONOCYTES # BLD: 0.5 K/UL (ref 0.1–1.3)
MONOCYTES NFR BLD: 8 % (ref 4–12)
NEUTS SEG # BLD: 4.4 K/UL (ref 1.7–8.2)
NEUTS SEG NFR BLD: 68 % (ref 43–78)
NITRITE UR QL STRIP.AUTO: NEGATIVE
NRBC # BLD: 0 K/UL (ref 0–0.2)
PH UR STRIP: 8 [PH] (ref 5–9)
PLATELET # BLD AUTO: 221 K/UL (ref 150–450)
PMV BLD AUTO: 9.7 FL (ref 9.4–12.3)
POTASSIUM SERPL-SCNC: 4.4 MMOL/L (ref 3.5–5.1)
PROT SERPL-MCNC: 7.5 G/DL (ref 6.3–8.2)
PROT UR STRIP-MCNC: NEGATIVE MG/DL
RBC # BLD AUTO: 4.48 M/UL (ref 4.05–5.2)
SODIUM SERPL-SCNC: 136 MMOL/L (ref 136–145)
SP GR UR REFRACTOMETRY: 1.01 (ref 1–1.02)
UROBILINOGEN UR QL STRIP.AUTO: 0.2 EU/DL (ref 0.2–1)
WBC # BLD AUTO: 6.4 K/UL (ref 4.3–11.1)

## 2021-06-04 PROCEDURE — 83690 ASSAY OF LIPASE: CPT

## 2021-06-04 PROCEDURE — 83735 ASSAY OF MAGNESIUM: CPT

## 2021-06-04 PROCEDURE — 85025 COMPLETE CBC W/AUTO DIFF WBC: CPT

## 2021-06-04 PROCEDURE — 99284 EMERGENCY DEPT VISIT MOD MDM: CPT

## 2021-06-04 PROCEDURE — 74011250636 HC RX REV CODE- 250/636: Performed by: EMERGENCY MEDICINE

## 2021-06-04 PROCEDURE — 80053 COMPREHEN METABOLIC PANEL: CPT

## 2021-06-04 PROCEDURE — 74176 CT ABD & PELVIS W/O CONTRAST: CPT

## 2021-06-04 PROCEDURE — 70450 CT HEAD/BRAIN W/O DYE: CPT

## 2021-06-04 PROCEDURE — 81003 URINALYSIS AUTO W/O SCOPE: CPT

## 2021-06-04 PROCEDURE — 96374 THER/PROPH/DIAG INJ IV PUSH: CPT

## 2021-06-04 RX ORDER — CONJUGATED ESTROGENS 0.62 MG/G
0.5 CREAM VAGINAL DAILY
COMMUNITY

## 2021-06-04 RX ORDER — NITROFURANTOIN 25; 75 MG/1; MG/1
100 CAPSULE ORAL DAILY
COMMUNITY
End: 2021-08-30 | Stop reason: ALTCHOICE

## 2021-06-04 RX ORDER — SERTRALINE HYDROCHLORIDE 25 MG/1
25 TABLET, FILM COATED ORAL DAILY
COMMUNITY
End: 2021-11-04

## 2021-06-04 RX ORDER — CETIRIZINE HCL 10 MG
10 TABLET ORAL DAILY
COMMUNITY

## 2021-06-04 RX ORDER — MONTELUKAST SODIUM 10 MG/1
10 TABLET ORAL DAILY
COMMUNITY

## 2021-06-04 RX ORDER — OMEPRAZOLE 20 MG/1
20 CAPSULE, DELAYED RELEASE ORAL DAILY
COMMUNITY
End: 2021-08-30

## 2021-06-04 RX ORDER — DOCUSATE SODIUM 100 MG/1
100 CAPSULE, LIQUID FILLED ORAL 2 TIMES DAILY
COMMUNITY
End: 2021-11-04

## 2021-06-04 RX ORDER — PYRITHIONE ZINC 1 %
SHAMPOO TOPICAL 3 TIMES DAILY
COMMUNITY

## 2021-06-04 RX ORDER — MORPHINE SULFATE 2 MG/ML
2 INJECTION, SOLUTION INTRAMUSCULAR; INTRAVENOUS ONCE
Status: COMPLETED | OUTPATIENT
Start: 2021-06-04 | End: 2021-06-04

## 2021-06-04 RX ORDER — LISINOPRIL 20 MG/1
20 TABLET ORAL DAILY
COMMUNITY

## 2021-06-04 RX ORDER — CEPHALEXIN 500 MG/1
500 CAPSULE ORAL 4 TIMES DAILY
COMMUNITY
End: 2021-08-30 | Stop reason: ALTCHOICE

## 2021-06-04 RX ORDER — ALBUTEROL SULFATE 90 UG/1
2 AEROSOL, METERED RESPIRATORY (INHALATION)
COMMUNITY
End: 2021-11-04

## 2021-06-04 RX ORDER — HYDROXYZINE 25 MG/1
TABLET, FILM COATED ORAL
COMMUNITY
End: 2021-11-04

## 2021-06-04 RX ORDER — MECLIZINE HYDROCHLORIDE 25 MG/1
25 TABLET ORAL
COMMUNITY
End: 2021-11-04

## 2021-06-04 RX ORDER — DEXTROMETHORPHAN HYDROBROMIDE, GUAIFENESIN 5; 100 MG/5ML; MG/5ML
650 LIQUID ORAL EVERY 8 HOURS
COMMUNITY
End: 2021-11-04

## 2021-06-04 RX ADMIN — MORPHINE SULFATE 2 MG: 2 INJECTION, SOLUTION INTRAMUSCULAR; INTRAVENOUS at 15:47

## 2021-06-04 NOTE — ED PROVIDER NOTES
66-year-old female presenting for 2 complaints. One is headache and ongoing issues with memory. She states she fell sometime back and since then she has had these issues and she would like her head scanned. Additionally, the patient's been complaining of left-sided flank and back pain for the past couple of days was diagnosed with a urinary tract infection started on antibiotics yesterday. Staff today felt like it was worsening so they sent her here for further evaluation. The history is provided by the patient and the EMS personnel. Urinary Pain   This is a new problem. The current episode started 2 days ago. The problem occurs every urination. The problem has not changed since onset. The quality of the pain is described as burning and stabbing. The pain is at a severity of 7/10. The pain is severe. There has been no fever. She is not sexually active. Associated symptoms include flank pain and back pain. Pertinent negatives include no chills, no sweats, no nausea, no vomiting, no discharge, no frequency, no hematuria, no hesitancy, no urgency and no abdominal pain. The patient is not pregnant. She has tried antibiotics for the symptoms. The treatment provided no relief. Her past medical history is significant for recurrent UTIs. Her past medical history does not include kidney stones, single kidney, urological procedure, urinary stasis, catheterization or urinary catheter problem. Flank Pain   This is a new problem. The current episode started 2 days ago. The problem has not changed since onset. The problem occurs constantly. The pain is associated with no known injury. The pain is present in the left side. The quality of the pain is described as stabbing and shooting. The pain does not radiate. The pain is at a severity of 7/10. The pain is moderate. The symptoms are aggravated by bending and twisting.  Pertinent negatives include no chest pain, no fever, no numbness, no weight loss, no headaches, no abdominal pain, no abdominal swelling, no bowel incontinence, no perianal numbness, no bladder incontinence, no dysuria, no pelvic pain, no leg pain, no paresis, no tingling and no weakness. She has tried nothing for the symptoms. Past Medical History:   Diagnosis Date    Atrial fibrillation ([address]     CAD (coronary artery disease)     afib    Chest pain 9/13/2016    TSERING (generalized anxiety disorder)     Gastrointestinal disorder irritable bowel    GERD (gastroesophageal reflux disease)     Hypertension     UTI (urinary tract infection)        Past Surgical History:   Procedure Laterality Date    HX CHOLECYSTECTOMY      Colostomy reversal    HX ORTHOPAEDIC      left hip replacement, rt pelvic fx    HX OTHER SURGICAL      Extensive abdominal surgical history         History reviewed. No pertinent family history. Social History     Socioeconomic History    Marital status:      Spouse name: Not on file    Number of children: Not on file    Years of education: Not on file    Highest education level: Not on file   Occupational History    Not on file   Tobacco Use    Smoking status: Never Smoker    Smokeless tobacco: Never Used   Substance and Sexual Activity    Alcohol use: No    Drug use: No    Sexual activity: Never   Other Topics Concern    Not on file   Social History Narrative    Not on file     Social Determinants of Health     Financial Resource Strain:     Difficulty of Paying Living Expenses:    Food Insecurity:     Worried About Running Out of Food in the Last Year:     [address] in the Last Year:    Transportation Needs:     Lack of Transportation (Medical):      Lack of Transportation (Non-Medical):    Physical Activity:     Days of Exercise per Week:     Minutes of Exercise per Session:    Stress:     Feeling of Stress :    Social Connections:     Frequency of Communication with Friends and Family:     Frequency of Social Gatherings with Friends and Family:     Attends Christian Services:     Active Member of Clubs or Organizations:     Attends Club or Organization Meetings:     Marital Status:    Intimate Partner Violence:     Fear of Current or Ex-Partner:     Emotionally Abused:     Physically Abused:     Sexually Abused: ALLERGIES: Aspirin, Ciprofloxacin, Codeine, Diltiazem, Doxycycline, Hydrochlorothiazide, Nsaids (non-steroidal anti-inflammatory drug), Oxycodone, Sulfa (sulfonamide antibiotics), Tramadol, and Vantin [cefpodoxime]    Review of Systems   Constitutional: Negative for chills, fever and weight loss. Cardiovascular: Negative for chest pain. Gastrointestinal: Negative for abdominal pain, bowel incontinence, nausea and vomiting. Genitourinary: Positive for flank pain. Negative for bladder incontinence, dysuria, frequency, hematuria, hesitancy, pelvic pain and urgency. Musculoskeletal: Positive for back pain. Neurological: Negative for tingling, weakness, numbness and headaches. All other systems reviewed and are negative. Vitals:    06/04/21 1406   BP: (!) 193/93   Pulse: 74   Resp: 16   Temp: 98.6 °F (37 °C)   SpO2: 98%   Weight: 63.5 kg (140 lb)   Height: 5' 1\" (1.549 m)            Physical Exam  Vitals and nursing note reviewed. Constitutional:       Appearance: She is well-developed. HENT:      Head: Normocephalic and atraumatic. Eyes:      Conjunctiva/sclera: Conjunctivae normal.      Pupils: Pupils are equal, round, and reactive to light. Cardiovascular:      Rate and Rhythm: Normal rate and regular rhythm. Pulmonary:      Effort: Pulmonary effort is normal.      Breath sounds: Normal breath sounds. Abdominal:      General: Bowel sounds are normal.      Palpations: Abdomen is soft. Musculoskeletal:         General: Normal range of motion. Cervical back: Neck supple. Skin:     General: Skin is warm and dry.    Neurological:      Mental Status: She is alert and oriented to person, place, and time. MDM  Number of Diagnoses or Management Options  Flank pain  Diagnosis management comments: 27-year-old female presenting for flank pain.        Amount and/or Complexity of Data Reviewed  Clinical lab tests: ordered and reviewed (Results for orders placed or performed during the hospital encounter of 06/04/21  -URINALYSIS W/ RFLX MICROSCOPIC       Result                      Value             Ref Range           Color                       YELLOW                                Appearance                  CLEAR                                 Specific gravity            1.015             1.001 - 1.02*       pH (UA)                     8.0               5.0 - 9.0           Protein                     Negative          NEG mg/dL           Glucose                     Negative          NEG mg/dL           Ketone                      Negative          NEG mg/dL           Bilirubin                   Negative          NEG                 Blood                       TRACE (A)         NEG                 Urobilinogen                0.2               0.2 - 1.0 EU*       Nitrites                    Negative          NEG                 Leukocyte Esterase          Negative          NEG                 Epithelial cells            10-20             0 /hpf              Bacteria                    0                 0 /hpf         -CBC WITH AUTOMATED DIFF       Result                      Value             Ref Range           WBC                         6.4               4.3 - 11.1 K*       RBC                         4.48              4.05 - 5.2 M*       HGB                         14.0              11.7 - 15.4 *       HCT                         40.8              35.8 - 46.3 %       MCV                         91.1              79.6 - 97.8 *       MCH                         31.3              26.1 - 32.9 *       MCHC                        34.3              31.4 - 35.0 *       RDW                         12.8 11.9 - 14.6 %       PLATELET                    221               150 - 450 K/*       MPV                         9.7               9.4 - 12.3 FL       ABSOLUTE NRBC               0.00              0.0 - 0.2 K/*       DF                          AUTOMATED                             NEUTROPHILS                 68                43 - 78 %           LYMPHOCYTES                 21                13 - 44 %           MONOCYTES                   8                 4.0 - 12.0 %        EOSINOPHILS                 2                 0.5 - 7.8 %         BASOPHILS                   1                 0.0 - 2.0 %         IMMATURE GRANULOCYTES       0                 0.0 - 5.0 %         ABS. NEUTROPHILS            4.4               1.7 - 8.2 K/*       ABS. LYMPHOCYTES            1.3               0.5 - 4.6 K/*       ABS. MONOCYTES              0.5               0.1 - 1.3 K/*       ABS. EOSINOPHILS            0.1               0.0 - 0.8 K/*       ABS. BASOPHILS              0.1               0.0 - 0.2 K/*       ABS. IMM.  GRANS.            0.0               0.0 - 0.5 K/*  -LIPASE       Result                      Value             Ref Range           Lipase                      85                73 - 494 U/L   -METABOLIC PANEL, COMPREHENSIVE       Result                      Value             Ref Range           Sodium                      136               136 - 145 mm*       Potassium                   4.4               3.5 - 5.1 mm*       Chloride                    102               98 - 107 mmo*       CO2                         29                21 - 32 mmol*       Anion gap                   5 (L)             7 - 16 mmol/L       Glucose                     117 (H)           65 - 100 mg/*       BUN                         11                8 - 23 MG/DL        Creatinine                  0.40 (L)          0.6 - 1.0 MG*       GFR est AA                  >60               >60 ml/min/1*       GFR est non-AA              >60 >60 ml/min/1*       Calcium                     9.3               8.3 - 10.4 M*       Bilirubin, total            0.7               0.2 - 1.1 MG*       ALT (SGPT)                  24                12 - 65 U/L         AST (SGOT)                  32                15 - 37 U/L         Alk. phosphatase            92                50 - 130 U/L        Protein, total              7.5               6.3 - 8.2 g/*       Albumin                     3.6               3.2 - 4.6 g/*       Globulin                    3.9 (H)           2.3 - 3.5 g/*       A-G Ratio                   0.9 (L)           1.2 - 3.5      -MAGNESIUM       Result                      Value             Ref Range           Magnesium                   2.2               1.8 - 2.4 mg*  )  Tests in the radiology section of CPT®: ordered and reviewed (CT HEAD WO CONT    Result Date: 6/4/2021  EXAMINATION: HEAD CT WITHOUT CONTRAST 6/4/2021 2:45 PM ACCESSION NUMBER: [id number] INDICATION: headache and memory issues COMPARISON: None available TECHNIQUE: Multiple-row detector helical CT examination of the head without intravenous contrast. Radiation dose reduction techniques were used for this study:  Our CT scanners use one or all of the following: Automated exposure control, adjustment of the mA and/or kVp according to patient's size, iterative reconstruction. FINDINGS: The ventricles are within normal limits for the degree of global brain parenchymal volume loss. There is no midline shift. The basilar cisterns are patent. There is no cerebellar tonsillar ectopia or herniation. Hypodensities in the periventricular and subcortical white matter are nonspecific, but they are most likely to represent chronic microangiopathy. Prior bilateral lens replacement. There is no evidence of acute intracranial hemorrhage or extra-axial collections. There is no CT evidence of acute infarction.   The paranasal sinuses and mastoid air cells are well aerated and clear. 1.5 cm sclerotic focus in the right parietal bone. 1. There is a 1.5 cm sclerotic focus in the right parietal bone. It is well marginated, without associated osseous destruction. While this is favored to be a benign process such as a bone island or hemangioma, if there is a history of malignancy or other concern for systemic osseous disease, a bone scan could be obtained to for complete characterization. 2. Otherwise no evidence of acute intracranial abnormality. VOICE DICTATED BY: Dr. Akanksha Lawrence CONT    Result Date: 6/4/2021  EXAMINATION: CT UROGRAM WO CONT 6/4/2021 2:45 PM ACCESSION NUMBER:  [id number] INDICATION:  Flank pain and urinary pain for one week COMPARISON: None available TECHNIQUE: Contiguous axial computed tomographic images were obtained of of the abdomen and pelvis without intravenous or oral contrast utilizing the renal stone protocol. Coronal reconstructions were performed. Please note that the absence of intravenous contrast limits solid organ, soft tissue, and vascular detail. Radiation dose reduction techniques were used for this study:  Our CT scanners use one or all of the following: Automated exposure control, adjustment of the mA and/or kVp according to patient's size, iterative reconstruction. FINDINGS: LIMITED THORAX:Right and posterior descending coronary artery atherosclerosis. Mitral and aortic calcifications. Bibasilar atelectasis. PERITONEUM/MESENTERY: No evidence of ascites, free gas, or lymphadenopathy. GI TRACT: Unremarkable appearance of a sigmoid colonic anastomosis. Sigmoid and descending colonic diverticulosis without evidence of diverticulitis. The terminal ileum is unremarkable. The appendix is normal. There is no evidence of a small bowel obstruction. Anterior abdominal wall eventration with aperture measuring 4.8 cm. This contains an unremarkable loop of bowel. The stomach is unremarkable. SPLEEN: Unremarkable included portions.  ADRENALS: Normal PANCREAS: Normal LIVER: There is pneumobilia throughout the left greater than right hepatic lobes and within the common bile duct. GALLBLADDER: Cholecystectomy. KIDNEYS: Within the limitation of beam hardening artifact from hip arthroplasties obscuring portions of the pelvis, there is no definite evidence of hydroureteronephrosis or nephroureterolithiasis. BLADDER/: No definite bladder calculi within the limits of artifact. Probable fluid within the vagina (axial image 74). VESSELS: Scattered atherosclerosis. No evidence of abdominal aortic aneurysm. BONES/SOFT TISSUES: Bilateral hip arthroplasties. Partially visualized internal fixation of the proximal right femur. Chronic healed fracture of the right inferior pubic ramus. The bones are osteopenic. Lumbar spine spondylosis without suspicious lytic or blastic bony lesions. 8.3 x 6.2 cm subcutaneous lipoma along the right flank (axial image 31). 1. No evidence of urinary tract stone disease within the limits of beam hardening artifact from hip arthroplasties obscuring the distal ureters and portions of the urinary bladder. 2. Large volume pneumobilia throughout the left greater than right hepatic lobes and in the common bile duct. Correlation for history of prior biliary procedure manipulation is recommended. Gas within the biliary infection cannot be definitively excluded but is deemed unlikely. 3. Extensive chronic findings otherwise as above. VOICE DICTATED BY: Dr. Jamshid Solitario     )  Tests in the medicine section of CPT®: ordered and reviewed  Discuss the patient with other providers: yes (Consulted GI)  Independent visualization of images, tracings, or specimens: yes    Risk of Complications, Morbidity, and/or Mortality  Presenting problems: high  Diagnostic procedures: high  Management options: moderate  General comments: I personally reviewed the patient's vital signs, laboratory tests, and/or radiological findings.   I discussed these findings with the patient and their significance. I answered all questions and explained that given these findings there is significant concern for increased morbidity and/or mortality without immediate intervention. As a result, I recommended admission to the hospital, consulted the appropriate service, and transitioned care to that service in improved condition      Patient Progress  Patient progress: improved    ED Course as of Jun 04 1600 Fri Jun 04, 2021   1520 Forwarded CT findings to gastroenterologist on-call to evaluate and make recommendations regarding significance further treatment    [JS]   80 Dr. Javon Black with gastroenterology reviewed the patient's chart and found the patient has a CT from 2017 that showed pneumobilia so this is likely a chronic finding given that she does not have any other signs of infection.     [JS]      ED Course User Index  [JS] Lv Joyce MD       Procedures

## 2021-06-04 NOTE — ED NOTES
I have reviewed discharge instructions with the patient. The patient verbalized understanding. Patient left ED via Discharge Method: stretcher to Home with [facility]. Opportunity for questions and clarification provided. Patient given 0 scripts. To continue your aftercare when you leave the hospital, you may receive an automated call from our care team to check in on how you are doing. This is a free service and part of our promise to provide the best care and service to meet your aftercare needs.  If you have questions, or wish to unsubscribe from this service please call [phone]. Thank you for Choosing our [facility] Emergency Department.

## 2021-06-04 NOTE — ED TRIAGE NOTES
Pt here via EMS from the [facility] and c/o UTI/flank pain/urinary pain for about a week. The PA at the [facility] started her on a antibiotic last night but staff felt the need to send her to ER. VS stable 180/92 with hx of htn. Masked.

## 2021-06-04 NOTE — DISCHARGE INSTRUCTIONS
We found no acute abnormality today. The patient expressed a sincere desire to have the medical team evaluate her medication list and determine what can be eliminated given that she is 80years old and has done quite well.

## 2021-08-30 PROBLEM — I35.0 NONRHEUMATIC AORTIC VALVE STENOSIS: Status: ACTIVE | Noted: 2021-08-30

## 2021-08-30 PROBLEM — R94.31 ABNORMAL EKG: Status: ACTIVE | Noted: 2021-08-30

## 2021-08-30 PROBLEM — R06.09 DOE (DYSPNEA ON EXERTION): Status: ACTIVE | Noted: 2021-08-30

## 2022-03-18 PROBLEM — J96.01 ACUTE RESPIRATORY FAILURE WITH HYPOXIA (HCC): Status: ACTIVE | Noted: 2018-02-09

## 2022-03-18 PROBLEM — G93.41 METABOLIC ENCEPHALOPATHY: Status: ACTIVE | Noted: 2019-10-12

## 2022-03-19 PROBLEM — J81.0 ACUTE PULMONARY EDEMA (HCC): Status: ACTIVE | Noted: 2018-02-09

## 2022-03-19 PROBLEM — I50.33 DIASTOLIC CHF, ACUTE ON CHRONIC (HCC): Status: ACTIVE | Noted: 2018-02-10

## 2022-03-19 PROBLEM — R06.09 DOE (DYSPNEA ON EXERTION): Status: ACTIVE | Noted: 2021-08-30

## 2022-03-19 PROBLEM — I35.0 NONRHEUMATIC AORTIC VALVE STENOSIS: Status: ACTIVE | Noted: 2021-08-30

## 2022-03-19 PROBLEM — R94.31 ABNORMAL EKG: Status: ACTIVE | Noted: 2021-08-30

## 2022-03-20 PROBLEM — E87.1 HYPONATREMIA: Status: ACTIVE | Noted: 2018-02-09

## 2022-03-20 PROBLEM — N39.0 UTI (URINARY TRACT INFECTION): Status: ACTIVE | Noted: 2019-10-12

## 2022-05-04 ENCOUNTER — APPOINTMENT (OUTPATIENT)
Dept: CT IMAGING | Age: 87
End: 2022-05-04
Attending: EMERGENCY MEDICINE
Payer: MEDICARE

## 2022-05-04 ENCOUNTER — HOSPITAL ENCOUNTER (EMERGENCY)
Age: 87
Discharge: HOME | End: 2022-05-04
Attending: EMERGENCY MEDICINE
Payer: MEDICARE

## 2022-05-04 VITALS
HEIGHT: 60 IN | SYSTOLIC BLOOD PRESSURE: 178 MMHG | HEART RATE: 76 BPM | TEMPERATURE: 98 F | BODY MASS INDEX: 27.48 KG/M2 | WEIGHT: 140 LBS | OXYGEN SATURATION: 97 % | RESPIRATION RATE: 18 BRPM | DIASTOLIC BLOOD PRESSURE: 91 MMHG

## 2022-05-04 DIAGNOSIS — K52.9 ACUTE COLITIS: Primary | ICD-10-CM

## 2022-05-04 LAB
ALBUMIN SERPL-MCNC: 2.9 G/DL (ref 3.2–4.6)
ALBUMIN/GLOB SERPL: 0.9 {RATIO} (ref 1.2–3.5)
ALP SERPL-CCNC: 61 U/L (ref 50–130)
ALT SERPL-CCNC: 20 U/L (ref 12–65)
ANION GAP SERPL CALC-SCNC: 12 MMOL/L (ref 7–16)
APPEARANCE UR: CLEAR
AST SERPL-CCNC: 23 U/L (ref 15–37)
BASOPHILS # BLD: 0.1 K/UL (ref 0–0.2)
BASOPHILS NFR BLD: 1 % (ref 0–2)
BILIRUB SERPL-MCNC: 0.6 MG/DL (ref 0.2–1.1)
BILIRUB UR QL: NEGATIVE
BUN SERPL-MCNC: 16 MG/DL (ref 8–23)
CALCIUM SERPL-MCNC: 8.3 MG/DL (ref 8.3–10.4)
CHLORIDE SERPL-SCNC: 110 MMOL/L (ref 98–107)
CO2 SERPL-SCNC: 24 MMOL/L (ref 21–32)
COLOR UR: YELLOW
CREAT SERPL-MCNC: 0.47 MG/DL (ref 0.6–1)
DIFFERENTIAL METHOD BLD: ABNORMAL
EOSINOPHIL # BLD: 0.1 K/UL (ref 0–0.8)
EOSINOPHIL NFR BLD: 1 % (ref 0.5–7.8)
ERYTHROCYTE [DISTWIDTH] IN BLOOD BY AUTOMATED COUNT: 13.7 % (ref 11.9–14.6)
GLOBULIN SER CALC-MCNC: 3.3 G/DL (ref 2.3–3.5)
GLUCOSE SERPL-MCNC: 114 MG/DL (ref 65–100)
GLUCOSE UR STRIP.AUTO-MCNC: NEGATIVE MG/DL
HCT VFR BLD AUTO: 36.3 % (ref 35.8–46.3)
HGB BLD-MCNC: 12.5 G/DL (ref 11.7–15.4)
HGB UR QL STRIP: NEGATIVE
IMM GRANULOCYTES # BLD AUTO: 0.1 K/UL (ref 0–0.5)
IMM GRANULOCYTES NFR BLD AUTO: 1 % (ref 0–5)
KETONES UR QL STRIP.AUTO: 40 MG/DL
LEUKOCYTE ESTERASE UR QL STRIP.AUTO: NEGATIVE
LIPASE SERPL-CCNC: 44 U/L (ref 73–393)
LYMPHOCYTES # BLD: 1.4 K/UL (ref 0.5–4.6)
LYMPHOCYTES NFR BLD: 13 % (ref 13–44)
MAGNESIUM SERPL-MCNC: 2 MG/DL (ref 1.8–2.4)
MCH RBC QN AUTO: 31.3 PG (ref 26.1–32.9)
MCHC RBC AUTO-ENTMCNC: 34.4 G/DL (ref 31.4–35)
MCV RBC AUTO: 90.8 FL (ref 79.6–97.8)
MONOCYTES # BLD: 1.1 K/UL (ref 0.1–1.3)
MONOCYTES NFR BLD: 10 % (ref 4–12)
NEUTS SEG # BLD: 7.9 K/UL (ref 1.7–8.2)
NEUTS SEG NFR BLD: 74 % (ref 43–78)
NITRITE UR QL STRIP.AUTO: NEGATIVE
NRBC # BLD: 0 K/UL (ref 0–0.2)
PH UR STRIP: 6.5 [PH] (ref 5–9)
PLATELET # BLD AUTO: 170 K/UL (ref 150–450)
PMV BLD AUTO: 9.8 FL (ref 9.4–12.3)
POTASSIUM SERPL-SCNC: 2.8 MMOL/L (ref 3.5–5.1)
PROT SERPL-MCNC: 6.2 G/DL (ref 6.3–8.2)
PROT UR STRIP-MCNC: NEGATIVE MG/DL
RBC # BLD AUTO: 4 M/UL (ref 4.05–5.2)
SODIUM SERPL-SCNC: 146 MMOL/L (ref 136–145)
SP GR UR REFRACTOMETRY: 1.02 (ref 1–1.02)
UROBILINOGEN UR QL STRIP.AUTO: 1 EU/DL (ref 0.2–1)
WBC # BLD AUTO: 10.6 K/UL (ref 4.3–11.1)

## 2022-05-04 PROCEDURE — 83735 ASSAY OF MAGNESIUM: CPT

## 2022-05-04 PROCEDURE — 96375 TX/PRO/DX INJ NEW DRUG ADDON: CPT

## 2022-05-04 PROCEDURE — 80053 COMPREHEN METABOLIC PANEL: CPT

## 2022-05-04 PROCEDURE — 81003 URINALYSIS AUTO W/O SCOPE: CPT

## 2022-05-04 PROCEDURE — 96374 THER/PROPH/DIAG INJ IV PUSH: CPT

## 2022-05-04 PROCEDURE — 83690 ASSAY OF LIPASE: CPT

## 2022-05-04 PROCEDURE — 74177 CT ABD & PELVIS W/CONTRAST: CPT

## 2022-05-04 PROCEDURE — 85025 COMPLETE CBC W/AUTO DIFF WBC: CPT

## 2022-05-04 PROCEDURE — 51701 INSERT BLADDER CATHETER: CPT

## 2022-05-04 PROCEDURE — 74011000258 HC RX REV CODE- 258: Performed by: EMERGENCY MEDICINE

## 2022-05-04 PROCEDURE — 99285 EMERGENCY DEPT VISIT HI MDM: CPT

## 2022-05-04 PROCEDURE — 74011250636 HC RX REV CODE- 250/636: Performed by: EMERGENCY MEDICINE

## 2022-05-04 PROCEDURE — 74011000636 HC RX REV CODE- 636: Performed by: EMERGENCY MEDICINE

## 2022-05-04 RX ORDER — SODIUM CHLORIDE 0.9 % (FLUSH) 0.9 %
5-10 SYRINGE (ML) INJECTION AS NEEDED
Status: DISCONTINUED | OUTPATIENT
Start: 2022-05-04 | End: 2022-05-04 | Stop reason: HOSPADM

## 2022-05-04 RX ORDER — HYDROCODONE BITARTRATE AND ACETAMINOPHEN 5; 325 MG/1; MG/1
1 TABLET ORAL
Qty: 9 TABLET | Refills: 0 | Status: SHIPPED | OUTPATIENT
Start: 2022-05-04 | End: 2022-05-07

## 2022-05-04 RX ORDER — CEFDINIR 300 MG/1
300 CAPSULE ORAL 2 TIMES DAILY
Qty: 14 CAPSULE | Refills: 0 | Status: SHIPPED | OUTPATIENT
Start: 2022-05-04 | End: 2022-05-11

## 2022-05-04 RX ORDER — SODIUM CHLORIDE 0.9 % (FLUSH) 0.9 %
10 SYRINGE (ML) INJECTION
Status: COMPLETED | OUTPATIENT
Start: 2022-05-04 | End: 2022-05-04

## 2022-05-04 RX ORDER — ONDANSETRON 2 MG/ML
4 INJECTION INTRAMUSCULAR; INTRAVENOUS
Status: COMPLETED | OUTPATIENT
Start: 2022-05-04 | End: 2022-05-04

## 2022-05-04 RX ORDER — METRONIDAZOLE 500 MG/1
500 TABLET ORAL 2 TIMES DAILY
Qty: 14 TABLET | Refills: 0 | Status: SHIPPED | OUTPATIENT
Start: 2022-05-04 | End: 2022-05-11

## 2022-05-04 RX ORDER — MORPHINE SULFATE 2 MG/ML
2 INJECTION, SOLUTION INTRAMUSCULAR; INTRAVENOUS
Status: COMPLETED | OUTPATIENT
Start: 2022-05-04 | End: 2022-05-04

## 2022-05-04 RX ORDER — SODIUM CHLORIDE 0.9 % (FLUSH) 0.9 %
5-10 SYRINGE (ML) INJECTION EVERY 8 HOURS
Status: DISCONTINUED | OUTPATIENT
Start: 2022-05-04 | End: 2022-05-04 | Stop reason: HOSPADM

## 2022-05-04 RX ADMIN — IOPAMIDOL 100 ML: 755 INJECTION, SOLUTION INTRAVENOUS at 16:53

## 2022-05-04 RX ADMIN — SODIUM CHLORIDE 100 ML: 900 INJECTION, SOLUTION INTRAVENOUS at 16:53

## 2022-05-04 RX ADMIN — MORPHINE SULFATE 2 MG: 2 INJECTION, SOLUTION INTRAMUSCULAR; INTRAVENOUS at 15:00

## 2022-05-04 RX ADMIN — Medication 10 ML: at 16:53

## 2022-05-04 RX ADMIN — DIATRIZOATE MEGLUMINE AND DIATRIZOATE SODIUM 15 ML: 660; 100 LIQUID ORAL; RECTAL at 15:10

## 2022-05-04 RX ADMIN — ONDANSETRON 4 MG: 2 INJECTION INTRAMUSCULAR; INTRAVENOUS at 15:00

## 2022-05-04 NOTE — ED NOTES
I have reviewed discharge instructions with the patient and caregiver. The caregiver verbalized understanding. Patient left ED via Discharge Method: stretcher to the [facility] with Jd Casanova). Opportunity for questions and clarification provided. Patient given 3 scripts. To continue your aftercare when you leave the hospital, you may receive an automated call from our care team to check in on how you are doing. This is a free service and part of our promise to provide the best care and service to meet your aftercare needs.  If you have questions, or wish to unsubscribe from this service please call [phone]. Thank you for Choosing our [facility] Emergency Department.

## 2022-05-04 NOTE — ED NOTES
Notified staff that pt will be returning to the facility tonight with [facility]. [facility] has been called for transport.

## 2022-05-04 NOTE — ED PROVIDER NOTES
Presents ER with complaints of diarrhea. States she is here with diarrhea persistent for the past 2 weeks. Reports abdominal pain, particularly left side of her abdomen. States she has tried Lomotil without any improvement. Denies fevers or chills. Does report occasional pain as well    The history is provided by the patient. Diarrhea   This is a recurrent problem. The current episode started more than 1 week ago. The problem occurs constantly. The problem has not changed since onset. The pain is located in the generalized abdominal region and LLQ. The quality of the pain is aching and cramping. The pain is at a severity of 5/10. The pain is moderate. Associated symptoms include diarrhea. Pertinent negatives include no fever, no flatus, no melena, no vomiting, no dysuria, no chest pain and no back pain. Past workup includes CT scan. Her past medical history is significant for diverticulitis. Past Medical History:   Diagnosis Date    Atrial fibrillation ([address]     CAD (coronary artery disease)     afib    Chest pain 9/13/2016    TSERING (generalized anxiety disorder)     Gastrointestinal disorder irritable bowel    GERD (gastroesophageal reflux disease)     Hypertension     UTI (urinary tract infection)        Past Surgical History:   Procedure Laterality Date    HX CHOLECYSTECTOMY      Colostomy reversal    HX ORTHOPAEDIC      left hip replacement, rt pelvic fx    HX OTHER SURGICAL      Extensive abdominal surgical history         History reviewed. No pertinent family history.     Social History     Socioeconomic History    Marital status:      Spouse name: Not on file    Number of children: Not on file    Years of education: Not on file    Highest education level: Not on file   Occupational History    Not on file   Tobacco Use    Smoking status: Never Smoker    Smokeless tobacco: Never Used   Substance and Sexual Activity    Alcohol use: No    Drug use: No    Sexual activity: Never   Other Topics Concern    Not on file   Social History Narrative    Not on file     Social Determinants of Health     Financial Resource Strain:     Difficulty of Paying Living Expenses: Not on file   Food Insecurity:     Worried About Running Out of Food in the Last Year: Not on file    Zandra of Food in the Last Year: Not on file   Transportation Needs:     Lack of Transportation (Medical): Not on file    Lack of Transportation (Non-Medical): Not on file   Physical Activity:     Days of Exercise per Week: Not on file    Minutes of Exercise per Session: Not on file   Stress:     Feeling of Stress : Not on file   Social Connections:     Frequency of Communication with Friends and Family: Not on file    Frequency of Social Gatherings with Friends and Family: Not on file    Attends Druze Services: Not on file    Active Member of [address] [place] [address] or Organizations: Not on file    Attends Club or Organization Meetings: Not on file    Marital Status: Not on file   Intimate Partner Violence:     Fear of Current or Ex-Partner: Not on file    Emotionally Abused: Not on file    Physically Abused: Not on file    Sexually Abused: Not on file   Housing Stability:     Unable to Pay for Housing in the Last Year: Not on file    Number of [place] in the Last Year: Not on file    Unstable Housing in the Last Year: Not on file         ALLERGIES: Aspirin, Ciprofloxacin, Codeine, Diltiazem, Doxycycline, Hydrochlorothiazide, Nsaids (non-steroidal anti-inflammatory drug), Oxycodone, Sulfa (sulfonamide antibiotics), Tramadol, and Vantin [cefpodoxime]    Review of Systems   Constitutional: Negative for chills and fever. HENT: Negative for congestion, dental problem, tinnitus and voice change. Eyes: Negative for redness and visual disturbance. Respiratory: Negative for choking, chest tightness and wheezing. Cardiovascular: Negative for chest pain and leg swelling.    Gastrointestinal: Positive for diarrhea. Negative for abdominal pain, flatus, melena and vomiting. Endocrine: Negative for polyphagia and polyuria. Genitourinary: Negative for decreased urine volume, dysuria, urgency and vaginal pain. Musculoskeletal: Negative for back pain and gait problem. Skin: Negative for color change and pallor. Allergic/Immunologic: Negative for food allergies and immunocompromised state. Neurological: Negative for syncope, speech difficulty, weakness, light-headedness and numbness. Hematological: Negative for adenopathy. Does not bruise/bleed easily. Psychiatric/Behavioral: Negative for behavioral problems and confusion. All other systems reviewed and are negative. Vitals:    05/04/22 1212 05/04/22 1315   BP: (!) 174/73 (!) 201/95   Pulse: 65    Resp: 18    Temp: 97.9 °F (36.6 °C)    SpO2: 94% 98%   Weight: 63.5 kg (140 lb)    Height: 5' (1.524 m)             Physical Exam  Vitals and nursing note reviewed. Constitutional:       General: She is not in acute distress. Appearance: Normal appearance. She is not ill-appearing. HENT:      Head: Normocephalic and atraumatic. Right Ear: External ear normal.      Left Ear: External ear normal.      Nose: Nose normal. No congestion or rhinorrhea. Mouth/Throat:      Mouth: Mucous membranes are dry. Pharynx: No oropharyngeal exudate or posterior oropharyngeal erythema. Eyes:      Extraocular Movements: Extraocular movements intact. Pupils: Pupils are equal, round, and reactive to light. Cardiovascular:      Rate and Rhythm: Normal rate and regular rhythm. Pulses: Normal pulses. Heart sounds: Normal heart sounds. Pulmonary:      Effort: Pulmonary effort is normal.      Breath sounds: Normal breath sounds. No wheezing, rhonchi or rales. Abdominal:      General: Abdomen is flat. There is no distension. Palpations: Abdomen is soft. There is no mass. Tenderness: There is abdominal tenderness.       Hernia: No hernia is present. Musculoskeletal:         General: No swelling, tenderness or deformity. Normal range of motion. Cervical back: Normal range of motion and neck supple. No rigidity or tenderness. Skin:     General: Skin is warm. Capillary Refill: Capillary refill takes less than 2 seconds. Coloration: Skin is not jaundiced or pale. Findings: No erythema. Neurological:      General: No focal deficit present. Mental Status: She is oriented to person, place, and time. Cranial Nerves: No cranial nerve deficit. Sensory: No sensory deficit. Motor: No weakness. Psychiatric:         Mood and Affect: Mood normal.         Behavior: Behavior normal.         Thought Content: Thought content normal.          MDM  Number of Diagnoses or Management Options  Diagnosis management comments: Will try to obtain stool specimen if possible. Check labs here. Possibly need imaging of abdomen    5:24 PM  Labs here are stable  CT scan here is consistent with colitis  No evidence of obstruction or abscess. Vascular calcifications noted. Urinary bladder wall thickness well    Symptomatically patient feels improved. Plan to discharge home. Will place on a course of antibiotics.        Amount and/or Complexity of Data Reviewed  Clinical lab tests: ordered and reviewed  Tests in the radiology section of CPT®: ordered and reviewed    Risk of Complications, Morbidity, and/or Mortality  Presenting problems: moderate  Diagnostic procedures: moderate  Management options: moderate    Patient Progress  Patient progress: stable         Procedures          Results Include:    Recent Results (from the past 24 hour(s))   CBC WITH AUTOMATED DIFF    Collection Time: 05/04/22 12:58 PM   Result Value Ref Range    WBC 10.6 4.3 - 11.1 K/uL    RBC 4.00 (L) 4.05 - 5.2 M/uL    HGB 12.5 11.7 - 15.4 g/dL    HCT 36.3 35.8 - 46.3 %    MCV 90.8 79.6 - 97.8 FL    MCH 31.3 26.1 - 32.9 PG    MCHC 34.4 31.4 - 35.0 g/dL    RDW 13.7 11.9 - 14.6 %    PLATELET 246 455 - 329 K/uL    MPV 9.8 9.4 - 12.3 FL    ABSOLUTE NRBC 0.00 0.0 - 0.2 K/uL    DF AUTOMATED      NEUTROPHILS 74 43 - 78 %    LYMPHOCYTES 13 13 - 44 %    MONOCYTES 10 4.0 - 12.0 %    EOSINOPHILS 1 0.5 - 7.8 %    BASOPHILS 1 0.0 - 2.0 %    IMMATURE GRANULOCYTES 1 0.0 - 5.0 %    ABS. NEUTROPHILS 7.9 1.7 - 8.2 K/UL    ABS. LYMPHOCYTES 1.4 0.5 - 4.6 K/UL    ABS. MONOCYTES 1.1 0.1 - 1.3 K/UL    ABS. EOSINOPHILS 0.1 0.0 - 0.8 K/UL    ABS. BASOPHILS 0.1 0.0 - 0.2 K/UL    ABS. IMM. GRANS. 0.1 0.0 - 0.5 K/UL   METABOLIC PANEL, COMPREHENSIVE    Collection Time: 05/04/22 12:58 PM   Result Value Ref Range    Sodium 146 (H) 136 - 145 mmol/L    Potassium 2.8 (L) 3.5 - 5.1 mmol/L    Chloride 110 (H) 98 - 107 mmol/L    CO2 24 21 - 32 mmol/L    Anion gap 12 7 - 16 mmol/L    Glucose 114 (H) 65 - 100 mg/dL    BUN 16 8 - 23 MG/DL    Creatinine 0.47 (L) 0.6 - 1.0 MG/DL    GFR est AA >60 >60 ml/min/1.73m2    GFR est non-AA >60 >60 ml/min/1.73m2    Calcium 8.3 8.3 - 10.4 MG/DL    Bilirubin, total 0.6 0.2 - 1.1 MG/DL    ALT (SGPT) 20 12 - 65 U/L    AST (SGOT) 23 15 - 37 U/L    Alk.  phosphatase 61 50 - 130 U/L    Protein, total 6.2 (L) 6.3 - 8.2 g/dL    Albumin 2.9 (L) 3.2 - 4.6 g/dL    Globulin 3.3 2.3 - 3.5 g/dL    A-G Ratio 0.9 (L) 1.2 - 3.5     MAGNESIUM    Collection Time: 05/04/22 12:58 PM   Result Value Ref Range    Magnesium 2.0 1.8 - 2.4 mg/dL   LIPASE    Collection Time: 05/04/22 12:58 PM   Result Value Ref Range    Lipase 44 (L) 73 - 393 U/L   URINALYSIS W/ RFLX MICROSCOPIC    Collection Time: 05/04/22  3:27 PM   Result Value Ref Range    Color YELLOW      Appearance CLEAR      Specific gravity 1.018 1.001 - 1.023      pH (UA) 6.5 5.0 - 9.0      Protein Negative NEG mg/dL    Glucose Negative mg/dL    Ketone 40 (A) NEG mg/dL    Bilirubin Negative NEG      Blood Negative NEG      Urobilinogen 1.0 0.2 - 1.0 EU/dL    Nitrites Negative NEG      Leukocyte Esterase Negative NEG       Voice dictation software was used during the making of this note. This software is not perfect and grammatical and other typographical errors may be present. This note has been proofread, but may still contain errors. Remington Dia MD; 5/4/2022 @5:25 PM   ===================================================================    I wore appropriate PPE throughout this patient's ED visit.  Remington Dia MD, 5:25 PM

## 2022-05-04 NOTE — PROGRESS NOTES
In to see patient in #6. Lying in bed. No one in room. Awake and states her stomach feels bloated and is hurting. States she has had diarrhea for the past 2 weeks and she's tired of cleaning that \"shit\" up. Lives at [facility] in [place] [address] and had been there x 4 years. States she has a son and daughter in law Daylene  and Daljit Sarahgger) [phone]. States her son is aware she is here and wants to wait to call him again for right now. States she sees Dr. Refugio Dobson for her PCP but hasn't seen her in a while because \"I don't go to the doctor unless I really need to\". States she has CHF and high blood pressure.  Severiano Buster

## 2022-05-04 NOTE — ED TRIAGE NOTES
Pt brought in by EMS from the [place] [facility]. Pt with periodic diarrhea for 2 weeks. No relief with OTC meds. Pt with hx of generalized chronic pain.

## 2023-02-25 ENCOUNTER — APPOINTMENT (OUTPATIENT)
Dept: CT IMAGING | Age: 88
End: 2023-02-25
Payer: OTHER GOVERNMENT

## 2023-02-25 ENCOUNTER — HOSPITAL ENCOUNTER (OUTPATIENT)
Age: 88
Setting detail: OBSERVATION
Discharge: HOME | End: 2023-02-26
Attending: EMERGENCY MEDICINE | Admitting: FAMILY MEDICINE
Payer: OTHER GOVERNMENT

## 2023-02-25 ENCOUNTER — APPOINTMENT (OUTPATIENT)
Dept: GENERAL RADIOLOGY | Age: 88
End: 2023-02-25
Payer: OTHER GOVERNMENT

## 2023-02-25 DIAGNOSIS — I50.32 CHRONIC HEART FAILURE WITH PRESERVED EJECTION FRACTION (HCC): Chronic | ICD-10-CM

## 2023-02-25 DIAGNOSIS — M79.662 PAIN IN BOTH LOWER LEGS: ICD-10-CM

## 2023-02-25 DIAGNOSIS — R09.89 PULMONARY CONGESTION: ICD-10-CM

## 2023-02-25 DIAGNOSIS — M79.661 PAIN IN BOTH LOWER LEGS: ICD-10-CM

## 2023-02-25 DIAGNOSIS — I10 ESSENTIAL HYPERTENSION: ICD-10-CM

## 2023-02-25 DIAGNOSIS — K59.00 CONSTIPATION, UNSPECIFIED CONSTIPATION TYPE: ICD-10-CM

## 2023-02-25 DIAGNOSIS — J81.0 ACUTE PULMONARY EDEMA (HCC): Primary | ICD-10-CM

## 2023-02-25 DIAGNOSIS — R10.84 GENERALIZED ABDOMINAL PAIN: ICD-10-CM

## 2023-02-25 PROBLEM — J96.01 ACUTE RESPIRATORY FAILURE WITH HYPOXIA (HCC): Status: RESOLVED | Noted: 2018-02-09 | Resolved: 2023-02-25

## 2023-02-25 PROBLEM — K59.01 SLOW TRANSIT CONSTIPATION: Chronic | Status: ACTIVE | Noted: 2019-09-16

## 2023-02-25 PROBLEM — K59.01 SLOW TRANSIT CONSTIPATION: Status: ACTIVE | Noted: 2019-09-16

## 2023-02-25 PROBLEM — N39.0 UTI (URINARY TRACT INFECTION): Status: RESOLVED | Noted: 2019-10-12 | Resolved: 2023-02-25

## 2023-02-25 PROBLEM — G93.41 METABOLIC ENCEPHALOPATHY: Status: RESOLVED | Noted: 2019-10-12 | Resolved: 2023-02-25

## 2023-02-25 PROBLEM — I50.33 DIASTOLIC CHF, ACUTE ON CHRONIC (HCC): Status: RESOLVED | Noted: 2018-02-10 | Resolved: 2023-02-25

## 2023-02-25 PROBLEM — R94.31 ABNORMAL EKG: Status: RESOLVED | Noted: 2021-08-30 | Resolved: 2023-02-25

## 2023-02-25 PROBLEM — E87.1 HYPONATREMIA: Status: RESOLVED | Noted: 2018-02-09 | Resolved: 2023-02-25

## 2023-02-25 LAB
ALBUMIN SERPL-MCNC: 3.6 G/DL (ref 3.2–4.6)
ALBUMIN/GLOB SERPL: 1.1 (ref 0.4–1.6)
ALP SERPL-CCNC: 89 U/L (ref 50–136)
ALT SERPL-CCNC: 24 U/L (ref 12–65)
ANION GAP SERPL CALC-SCNC: 5 MMOL/L (ref 2–11)
APPEARANCE UR: NORMAL
AST SERPL-CCNC: 20 U/L (ref 15–37)
BILIRUB SERPL-MCNC: 0.6 MG/DL (ref 0.2–1.1)
BILIRUB UR QL: NEGATIVE
BUN SERPL-MCNC: 19 MG/DL (ref 8–23)
CALCIUM SERPL-MCNC: 9 MG/DL (ref 8.3–10.4)
CHLORIDE SERPL-SCNC: 107 MMOL/L (ref 101–110)
CO2 SERPL-SCNC: 30 MMOL/L (ref 21–32)
COLOR UR: NORMAL
CREAT SERPL-MCNC: 0.49 MG/DL (ref 0.6–1)
ERYTHROCYTE [DISTWIDTH] IN BLOOD BY AUTOMATED COUNT: 13.4 % (ref 11.9–14.6)
FOLATE SERPL-MCNC: >20 NG/ML (ref 3.1–17.5)
GLOBULIN SER CALC-MCNC: 3.4 G/DL (ref 2.8–4.5)
GLUCOSE SERPL-MCNC: 134 MG/DL (ref 65–100)
GLUCOSE UR STRIP.AUTO-MCNC: NEGATIVE MG/DL
HCT VFR BLD AUTO: 42 % (ref 35.8–46.3)
HGB BLD-MCNC: 13.8 G/DL (ref 11.7–15.4)
HGB UR QL STRIP: NEGATIVE
KETONES UR QL STRIP.AUTO: NEGATIVE MG/DL
LEUKOCYTE ESTERASE UR QL STRIP.AUTO: NEGATIVE
LIPASE SERPL-CCNC: 117 U/L (ref 73–393)
MAGNESIUM SERPL-MCNC: 2 MG/DL (ref 1.8–2.4)
MCH RBC QN AUTO: 31 PG (ref 26.1–32.9)
MCHC RBC AUTO-ENTMCNC: 32.9 G/DL (ref 31.4–35)
MCV RBC AUTO: 94.4 FL (ref 82–102)
NITRITE UR QL STRIP.AUTO: NEGATIVE
NRBC # BLD: 0 K/UL (ref 0–0.2)
NT PRO BNP: 445 PG/ML
PH UR STRIP: 8.5 (ref 5–9)
PLATELET # BLD AUTO: 172 K/UL (ref 150–450)
PMV BLD AUTO: 10.1 FL (ref 9.4–12.3)
POTASSIUM SERPL-SCNC: 3.7 MMOL/L (ref 3.5–5.1)
PROCALCITONIN SERPL-MCNC: <0.05 NG/ML (ref 0–0.49)
PROT SERPL-MCNC: 7 G/DL (ref 6.3–8.2)
PROT UR STRIP-MCNC: NEGATIVE MG/DL
RBC # BLD AUTO: 4.45 M/UL (ref 4.05–5.2)
SODIUM SERPL-SCNC: 142 MMOL/L (ref 133–143)
SP GR UR REFRACTOMETRY: 1.01 (ref 1–1.02)
TROPONIN I SERPL HS-MCNC: 10.1 PG/ML (ref 0–14)
TROPONIN I SERPL HS-MCNC: 8.9 PG/ML (ref 0–14)
UROBILINOGEN UR QL STRIP.AUTO: 0.2 EU/DL (ref 0.2–1)
VIT B12 SERPL-MCNC: 266 PG/ML (ref 193–986)
WBC # BLD AUTO: 6.1 K/UL (ref 4.3–11.1)

## 2023-02-25 PROCEDURE — 6370000000 HC RX 637 (ALT 250 FOR IP): Performed by: FAMILY MEDICINE

## 2023-02-25 PROCEDURE — 93005 ELECTROCARDIOGRAM TRACING: CPT | Performed by: EMERGENCY MEDICINE

## 2023-02-25 PROCEDURE — 84145 PROCALCITONIN (PCT): CPT

## 2023-02-25 PROCEDURE — 85027 COMPLETE CBC AUTOMATED: CPT

## 2023-02-25 PROCEDURE — 71045 X-RAY EXAM CHEST 1 VIEW: CPT

## 2023-02-25 PROCEDURE — 99285 EMERGENCY DEPT VISIT HI MDM: CPT

## 2023-02-25 PROCEDURE — 96374 THER/PROPH/DIAG INJ IV PUSH: CPT

## 2023-02-25 PROCEDURE — G0378 HOSPITAL OBSERVATION PER HR: HCPCS

## 2023-02-25 PROCEDURE — 96372 THER/PROPH/DIAG INJ SC/IM: CPT

## 2023-02-25 PROCEDURE — 83735 ASSAY OF MAGNESIUM: CPT

## 2023-02-25 PROCEDURE — 83690 ASSAY OF LIPASE: CPT

## 2023-02-25 PROCEDURE — 84484 ASSAY OF TROPONIN QUANT: CPT

## 2023-02-25 PROCEDURE — 71260 CT THORAX DX C+: CPT

## 2023-02-25 PROCEDURE — 6360000002 HC RX W HCPCS: Performed by: FAMILY MEDICINE

## 2023-02-25 PROCEDURE — 82607 VITAMIN B-12: CPT

## 2023-02-25 PROCEDURE — 6360000002 HC RX W HCPCS: Performed by: EMERGENCY MEDICINE

## 2023-02-25 PROCEDURE — 81003 URINALYSIS AUTO W/O SCOPE: CPT

## 2023-02-25 PROCEDURE — 83880 ASSAY OF NATRIURETIC PEPTIDE: CPT

## 2023-02-25 PROCEDURE — 2580000003 HC RX 258: Performed by: EMERGENCY MEDICINE

## 2023-02-25 PROCEDURE — 2500000003 HC RX 250 WO HCPCS: Performed by: FAMILY MEDICINE

## 2023-02-25 PROCEDURE — 80053 COMPREHEN METABOLIC PANEL: CPT

## 2023-02-25 PROCEDURE — 82746 ASSAY OF FOLIC ACID SERUM: CPT

## 2023-02-25 PROCEDURE — 6360000004 HC RX CONTRAST MEDICATION: Performed by: EMERGENCY MEDICINE

## 2023-02-25 RX ORDER — SODIUM CHLORIDE 0.9 % (FLUSH) 0.9 %
5-40 SYRINGE (ML) INJECTION PRN
Status: DISCONTINUED | OUTPATIENT
Start: 2023-02-25 | End: 2023-02-26 | Stop reason: HOSPADM

## 2023-02-25 RX ORDER — SODIUM CHLORIDE 0.9 % (FLUSH) 0.9 %
5-40 SYRINGE (ML) INJECTION EVERY 12 HOURS SCHEDULED
Status: DISCONTINUED | OUTPATIENT
Start: 2023-02-25 | End: 2023-02-26 | Stop reason: HOSPADM

## 2023-02-25 RX ORDER — L. ACIDOPHILUS/L.BULGARICUS 1MM CELL
4 TABLET ORAL 2 TIMES DAILY
Status: DISCONTINUED | OUTPATIENT
Start: 2023-02-25 | End: 2023-02-26 | Stop reason: HOSPADM

## 2023-02-25 RX ORDER — SENNA AND DOCUSATE SODIUM 50; 8.6 MG/1; MG/1
1 TABLET, FILM COATED ORAL 2 TIMES DAILY
Status: DISCONTINUED | OUTPATIENT
Start: 2023-02-25 | End: 2023-02-26 | Stop reason: HOSPADM

## 2023-02-25 RX ORDER — ONDANSETRON 2 MG/ML
4 INJECTION INTRAMUSCULAR; INTRAVENOUS EVERY 6 HOURS PRN
Status: DISCONTINUED | OUTPATIENT
Start: 2023-02-25 | End: 2023-02-26 | Stop reason: HOSPADM

## 2023-02-25 RX ORDER — ACETAMINOPHEN 325 MG/1
650 TABLET ORAL EVERY 6 HOURS PRN
Status: DISCONTINUED | OUTPATIENT
Start: 2023-02-25 | End: 2023-02-26 | Stop reason: HOSPADM

## 2023-02-25 RX ORDER — METOPROLOL SUCCINATE 50 MG/1
50 TABLET, EXTENDED RELEASE ORAL 2 TIMES DAILY
Status: DISCONTINUED | OUTPATIENT
Start: 2023-02-25 | End: 2023-02-26 | Stop reason: HOSPADM

## 2023-02-25 RX ORDER — POLYETHYLENE GLYCOL 3350 17 G/17G
17 POWDER, FOR SOLUTION ORAL DAILY
Status: DISCONTINUED | OUTPATIENT
Start: 2023-02-25 | End: 2023-02-26 | Stop reason: HOSPADM

## 2023-02-25 RX ORDER — GABAPENTIN 100 MG/1
100 CAPSULE ORAL 3 TIMES DAILY PRN
Status: DISCONTINUED | OUTPATIENT
Start: 2023-02-25 | End: 2023-02-26 | Stop reason: HOSPADM

## 2023-02-25 RX ORDER — FUROSEMIDE 10 MG/ML
40 INJECTION INTRAMUSCULAR; INTRAVENOUS
Status: COMPLETED | OUTPATIENT
Start: 2023-02-25 | End: 2023-02-25

## 2023-02-25 RX ORDER — MULTIVIT,STRESS FORMULA/ZINC
1 TABLET ORAL DAILY
Status: DISCONTINUED | OUTPATIENT
Start: 2023-02-25 | End: 2023-02-26 | Stop reason: HOSPADM

## 2023-02-25 RX ORDER — ONDANSETRON 4 MG/1
4 TABLET, ORALLY DISINTEGRATING ORAL EVERY 8 HOURS PRN
Status: DISCONTINUED | OUTPATIENT
Start: 2023-02-25 | End: 2023-02-26 | Stop reason: HOSPADM

## 2023-02-25 RX ORDER — LISINOPRIL 20 MG/1
20 TABLET ORAL DAILY
Status: DISCONTINUED | OUTPATIENT
Start: 2023-02-25 | End: 2023-02-26 | Stop reason: HOSPADM

## 2023-02-25 RX ORDER — 0.9 % SODIUM CHLORIDE 0.9 %
100 INTRAVENOUS SOLUTION INTRAVENOUS
Status: COMPLETED | OUTPATIENT
Start: 2023-02-25 | End: 2023-02-25

## 2023-02-25 RX ORDER — SODIUM CHLORIDE 9 MG/ML
INJECTION, SOLUTION INTRAVENOUS PRN
Status: DISCONTINUED | OUTPATIENT
Start: 2023-02-25 | End: 2023-02-26 | Stop reason: HOSPADM

## 2023-02-25 RX ORDER — ENOXAPARIN SODIUM 100 MG/ML
40 INJECTION SUBCUTANEOUS EVERY 24 HOURS
Status: DISCONTINUED | OUTPATIENT
Start: 2023-02-25 | End: 2023-02-26 | Stop reason: HOSPADM

## 2023-02-25 RX ORDER — MULTIVIT/FOLIC ACID/VIT K1 120-30 MCG
1 TABLET,CHEWABLE ORAL DAILY
Status: DISCONTINUED | OUTPATIENT
Start: 2023-02-25 | End: 2023-02-26

## 2023-02-25 RX ORDER — ACETAMINOPHEN 650 MG/1
650 SUPPOSITORY RECTAL EVERY 6 HOURS PRN
Status: DISCONTINUED | OUTPATIENT
Start: 2023-02-25 | End: 2023-02-26 | Stop reason: HOSPADM

## 2023-02-25 RX ADMIN — SODIUM CHLORIDE 100 ML: 9 INJECTION, SOLUTION INTRAVENOUS at 11:38

## 2023-02-25 RX ADMIN — SENNOSIDES AND DOCUSATE SODIUM 1 TABLET: 50; 8.6 TABLET ORAL at 20:09

## 2023-02-25 RX ADMIN — FUROSEMIDE 40 MG: 10 INJECTION, SOLUTION INTRAMUSCULAR; INTRAVENOUS at 13:56

## 2023-02-25 RX ADMIN — TUBERCULIN PURIFIED PROTEIN DERIVATIVE 5 UNITS: 5 INJECTION, SOLUTION INTRADERMAL at 17:35

## 2023-02-25 RX ADMIN — PSYLLIUM HUSK 1 PACKET: 3.4 POWDER ORAL at 17:45

## 2023-02-25 RX ADMIN — ENOXAPARIN SODIUM 40 MG: 40 INJECTION SUBCUTANEOUS at 17:36

## 2023-02-25 RX ADMIN — ZINC 1 TABLET: TAB ORAL at 20:21

## 2023-02-25 RX ADMIN — LISINOPRIL 20 MG: 20 TABLET ORAL at 17:45

## 2023-02-25 RX ADMIN — POLYETHYLENE GLYCOL 3350 17 G: 17 POWDER, FOR SOLUTION ORAL at 17:45

## 2023-02-25 RX ADMIN — METOPROLOL SUCCINATE 50 MG: 50 TABLET, EXTENDED RELEASE ORAL at 20:09

## 2023-02-25 RX ADMIN — IOPAMIDOL 100 ML: 755 INJECTION, SOLUTION INTRAVENOUS at 11:38

## 2023-02-25 RX ADMIN — LACTOBACILLUS TAB 4 TABLET: TAB at 20:10

## 2023-02-25 ASSESSMENT — ENCOUNTER SYMPTOMS
ABDOMINAL PAIN: 1
SHORTNESS OF BREATH: 0
DIARRHEA: 1
BLOOD IN STOOL: 0
COLOR CHANGE: 0
BACK PAIN: 0
CONSTIPATION: 1
RHINORRHEA: 0
COUGH: 0
NAUSEA: 0
VOMITING: 0

## 2023-02-25 ASSESSMENT — PAIN - FUNCTIONAL ASSESSMENT: PAIN_FUNCTIONAL_ASSESSMENT: NONE - DENIES PAIN

## 2023-02-25 NOTE — ED PROVIDER NOTES
Emergency Department Provider Note                   PCP:                Fred Pacheco MD               Age: 80 y.o. Sex: female       ICD-10-CM    1. Acute pulmonary edema (HCC)  J81.0       2. Generalized abdominal pain  R10.84       3. Constipation, unspecified constipation type  K59.00       4. Essential hypertension  I10           DISPOSITION Decision To Admit 02/25/2023 12:57:54 PM        Medical Decision Making  Patient's abdominal pain is improved but she remains moderately tender on exam.  Plan on labs, urine and CT scan of the abdomen and pelvis with IV contrast if creatinine and GFR will allow. Blood pressure elevated so we will obtain EKG and I will add on troponins and get a chest x-ray. I believe her shortness of breath is related to her pain and anxiety. 12:56 PM  Patient's work-up revealed abnormal chest x-ray which is followed by a chest CT and CT of her abdomen pelvis due to her abdominal pain and tenderness. Nothing was found in intra-abdominal he except moderate stool burden. Some new nodules are seen in the lungs but she is low risk as she was never a smoker and therefore CT scan imaging follow-up is not recommended. Patient and her son were informed of these findings. CT chest did show pulmonary edema. I am providing some Lasix and will put a pure wick in for her increased urination that will surely come. Hospitalist has agreed to admit her for serial abdominal exams as well as repeat echo and further work-up of new onset mild congestive heart failure. Amount and/or Complexity of Data Reviewed  External Data Reviewed: radiology. Details: CT from 6 months ago  Impression  1. Colitis. No evidence of bowel obstruction, abscess or perforation. 2. Constipation and diverticulosis. 3. Mild left lower lung infiltrate and trace left pleural effusion. 4. Extensive vascular calcifications. 5. Cholecystectomy and chronic pneumobilia that could be iatrogenic.   6. Urinary bladder wall thickening may indicate cystitis. 7. Hysterectomy. Cardiology note-PAF, toprol,  no anticoagulation (age 80)    last echo 5318- grade 1 diastolic dysfunction, EF 67-79%  Labs: ordered. Decision-making details documented in ED Course. Radiology: ordered and independent interpretation performed. Decision-making details documented in ED Course. ECG/medicine tests: ordered and independent interpretation performed. Details: ekg-NSR rate 60, Old inf infarct, NO STEMI, No acute ST-T changes. Risk  Prescription drug management. Orders Placed This Encounter   Procedures    XR CHEST PORTABLE    CT CHEST ABDOMEN PELVIS W CONTRAST Additional Contrast? None    CBC    Comprehensive Metabolic Panel    Lipase    Urinalysis    Troponin    Troponin    Brain Natriuretic Peptide    Straight cath    EKG 12 Lead    Insert peripheral IV        Medications   furosemide (LASIX) injection 40 mg (has no administration in time range)   iopamidol (ISOVUE-370) 76 % injection 100 mL (100 mLs IntraVENous Given 2/25/23 1138)   0.9 % sodium chloride bolus (100 mLs IntraVENous New Bag 2/25/23 1138)       New Prescriptions    No medications on file        Maryuri Bryant is a 80 y.o. female who presents to the Emergency Department with chief complaint of    Chief Complaint   Patient presents with    Abdominal Pain      80year-old female arrives from the [facility] with complaint of abdominal pain earlier this morning. Symptoms have improved. Patient is anxious and poor historian. She is alert and oriented x2. Her son arrives and is unable to provide any insight as into the reason for today's visit but reports to me that she likes to leave the [place] on the weekends frequently and is often nervous. Patient unable to describe the pain she had but says it lasted several minutes. Pain was rather diffuse but worse in the lower abdomen. No radiation of the pain. She has intermittent diarrhea and constipation. No blood in the stool reported. Chronic dysuria and frequency reported. The history is provided by the patient and a relative. Review of Systems   Constitutional:  Negative for chills and fever. HENT:  Negative for congestion and rhinorrhea. Respiratory:  Negative for cough and shortness of breath. Cardiovascular:  Negative for chest pain and leg swelling. Gastrointestinal:  Positive for abdominal pain, constipation and diarrhea. Negative for blood in stool, nausea and vomiting. Endocrine: Negative for polydipsia and polyuria. Genitourinary:  Positive for dysuria and frequency. Negative for hematuria. Musculoskeletal:  Positive for arthralgias (Chronic muscle and joint pain reported) and myalgias (Chronic muscle and joint pain \"pain all over\"). Negative for back pain. Skin:  Negative for color change and rash. Neurological:  Negative for weakness and numbness. All other systems reviewed and are negative. Past Medical History:   Diagnosis Date    Atrial fibrillation ([facility] [address]     CAD (coronary artery disease)     afib    Chest pain 9/13/2016    RUBY (generalized anxiety disorder)     Gastrointestinal disorder irritable bowel    GERD (gastroesophageal reflux disease)     Hypertension     UTI (urinary tract infection)         Past Surgical History:   Procedure Laterality Date    CHOLECYSTECTOMY      Colostomy reversal    ORTHOPEDIC SURGERY      left hip replacement, rt pelvic fx    OTHER SURGICAL HISTORY      Extensive abdominal surgical history        No family history on file.      Social History     Socioeconomic History    Marital status:    Tobacco Use    Smoking status: Never    Smokeless tobacco: Never   Substance and Sexual Activity    Alcohol use: No    Drug use: No         Aspirin, Cefpodoxime, Ciprofloxacin, Codeine, Diltiazem, Doxycycline, Hydrochlorothiazide, Nsaids, Oxycodone, Sulfa antibiotics, and Tramadol     Previous Medications    ACETAMINOPHEN (TYLENOL) 325 MG TABLET    Take by mouth    CETIRIZINE (ZYRTEC) 10 MG TABLET    Take 10 mg by mouth daily    CONJUGATED ESTROGENS (PREMARIN) 0.625 MG/GM VAGINAL CREAM    Place 0.5 g vaginally daily    LISINOPRIL (PRINIVIL;ZESTRIL) 20 MG TABLET    Take 20 mg by mouth daily    MENTHOL-METHYL SALICYLATE (THERA-GESIC) 0.5-15 % CREA    Apply topically 3 times daily    METOPROLOL SUCCINATE (TOPROL XL) 50 MG EXTENDED RELEASE TABLET    Take 50 mg by mouth 2 times daily    MONTELUKAST (SINGULAIR) 10 MG TABLET    Take 10 mg by mouth daily    NITROFURANTOIN, MACROCRYSTAL-MONOHYDRATE, (MACROBID) 100 MG CAPSULE    Take 100 mg by mouth daily    OMEPRAZOLE (PRILOSEC) 20 MG DELAYED RELEASE CAPSULE    Take 20 mg by mouth daily    POLYETHYLENE GLYCOL (GLYCOLAX) 17 GM/SCOOP POWDER    Take 17 g by mouth daily    SODIUM CHLORIDE (OCEAN) 0.65 % NASAL SPRAY    1 spray as needed        Vitals signs and nursing note reviewed. Patient Vitals for the past 4 hrs:   Temp Pulse Resp BP SpO2   02/25/23 1110 -- 73 24 -- 95 %   02/25/23 1100 -- 71 21 (!) 210/163 95 %   02/25/23 1040 -- 64 12 (!) 189/88 --   02/25/23 0940 -- -- -- (!) 213/97 97 %   02/25/23 0935 97.5 °F (36.4 °C) 65 18 (!) 213/97 98 %          Physical Exam  Vitals and nursing note reviewed. Constitutional:       Appearance: Normal appearance. HENT:      Head: Normocephalic and atraumatic. Nose: Nose normal.      Mouth/Throat:      Mouth: Mucous membranes are moist.   Eyes:      Conjunctiva/sclera: Conjunctivae normal.      Pupils: Pupils are equal, round, and reactive to light. Cardiovascular:      Rate and Rhythm: Normal rate and regular rhythm. Pulses: Normal pulses. Heart sounds: Normal heart sounds. Pulmonary:      Effort: Pulmonary effort is normal.      Breath sounds: Normal breath sounds. Abdominal:      General: Bowel sounds are normal. There is no distension. Palpations: Abdomen is soft. Tenderness:  There is generalized abdominal tenderness and tenderness in the right lower quadrant, suprapubic area and left lower quadrant. There is no guarding or rebound. Hernia: No hernia is present. Musculoskeletal:         General: Normal range of motion. Skin:     General: Skin is warm and dry. Neurological:      Mental Status: She is alert and oriented to person, place, and time. Psychiatric:         Behavior: Behavior normal.        Procedures    Results for orders placed or performed during the hospital encounter of 02/25/23   XR CHEST PORTABLE    Narrative    EXAM: XR CHEST PORTABLE  INDICATION: sob  COMPARISON: None    FINDINGS:  Cardiomediastinal silhouette is within normal limits for technique. Atherosclerotic calcifications in the aorta. No pleural effusion or  pneumothorax. Diffusely increased interstitial opacities. No focal alveolar  consolidation. No acute osseous abnormality. Multiple chronic right posterior  rib fractures. Impression    Diffuse interstitial opacities could represent chronic interstitial lung  changes, pulmonary edema, or atypical infection. CT CHEST ABDOMEN PELVIS W CONTRAST Additional Contrast? None    Narrative    EXAMINATION: CT CHEST, ABDOMEN, AND PELVIS 2/25/2023 11:51 AM    ACCESSION NUMBER: [id number]    INDICATION: Shortness of breath abdominal pain. COMPARISON: Chest radiograph, 2/25/2023. CT abdomen and pelvis, 5/4/2022    TECHNIQUE: Multiple contiguous axial CT images of the chest, abdomen, and pelvis  were obtained from the thoracic inlet to the pubic symphysis after the  intravenous administration of 100 mL Isovue-370 contrast material.    Radiation dose reduction techniques were used for this study:  Our CT scanners  use one or all of the following: Automated exposure control, adjustment of the  mA and/or kVp according to patient's size, iterative reconstruction. FINDINGS:    CHEST:    LUNGS/PLEURA:  Central airways are patent.  Minimal bibasilar dependent subsegmental  atelectasis. There is mild bilateral symmetric and basilar predominant  interlobular septal thickening. No alveolar consolidation. No pleural effusion  or pneumothorax. 4 mm peripheral solid nodule in the right middle lobe (axial  image 62), not visualized on the prior but potentially related to differences in  field of view. 5 mm solid nodule in the left lower lobe (axial image 36), region  not covered on comparison. MEDIASTINUM:  Visualized thyroid is normal. Thoracic esophagus is unremarkable. The heart is  enlarged without pericardial effusion. Great vessels are normal in caliber with  atherosclerotic calcifications including involvement of the coronary arteries. Evaluation for pulmonary emboli is diagnostic through the subsegmental level. No  evidence of acute or chronic pulmonary embolism. BONES AND SOFT TISSUES:   No axillary adenopathy. Regional soft tissues are unremarkable. Osteopenia. Multiple chronic healed rib fractures. There are multiple chronic appearing  compression fracture within the mid thoracic spine involving T3, T8, T9, and  T10. ABDOMEN/PELVIS:    LIVER: Normal    BILIARY: Status post cholecystectomy with unchanged pneumobilia likely related  to prior biliary intervention. SPLEEN: No splenomegaly or concerning lesion. PANCREAS: No pancreatic duct dilation or mass. ADRENALS: No adrenal nodules or hypertrophy. URINARY: Kidneys are symmetric in size and enhancement. There are bilateral  subcentimeter hypodensities which are too small to characterize but similar to  prior and statistically cysts. No hydronephrosis or nephrolithiasis. Ureters are  within normal limits. Urinary bladder is poorly visualized due to streak  artifact but grossly normal on the metal artifact reduction reformat. BOWEL: The stomach, small bowel, and large bowel are normal in caliber and wall  thickness. No inflammatory changes.  Postsurgical changes involving the right  colon and distal small bowel with patent appearing anastomosis in the right  lower quadrant. Additional large bowel anastomosis in the rectosigmoid region. There is a moderate colorectal stool burden with scattered diverticula. Appendix  is surgically absent. VASCULAR: The abdominal aorta and iliac arterial system are nonaneurysmal with  severe atherosclerotic calcifications. NODES: No lymphadenopathy. FLUID: No free intraperitoneal fluid. REPRODUCTIVE: Status post hysterectomy. BONES/SOFT TISSUES: Regional soft tissues are unremarkable. No acute or  aggressive osseous normality. Chronic right pubic rami fractures. Bilateral  total hip arthroplasties with resultant streak artifact. Chronic bilateral  sacral sufficiency fractures. Multiple chronic compression fractures in the  lower thoracic and lumbar spine are unchanged. Impression    CHEST:  1. No evidence of pulmonary embolism. 2. Mild interstitial pulmonary edema with cardiomegaly. 3. Pulmonary nodules measuring up to 5 mm, not definitively seen on prior. If  the patient is considered high risk per Fleischner Society guidelines, follow-up  CT in one year is recommended. If not high risk, no further follow-up required. 4. Atherosclerosis including coronary artery involvement. 5. Several chronic appearing midthoracic vertebral body compression fractures  without comparison imaging to document stability. Correlate for point tenderness  to ensure no acute component. ABDOMEN AND PELVIS:  1. No acute process. 2. Moderate colonic stool burden with scattered diverticula. No evidence of  bowel obstruction, colitis, or diverticulitis. Status post appendectomy.   3.       CBC   Result Value Ref Range    WBC 6.1 4.3 - 11.1 K/uL    RBC 4.45 4.05 - 5.2 M/uL    Hemoglobin 13.8 11.7 - 15.4 g/dL    Hematocrit 42.0 35.8 - 46.3 %    MCV 94.4 82.0 - 102.0 FL    MCH 31.0 26.1 - 32.9 PG    MCHC 32.9 31.4 - 35.0 g/dL    RDW 13.4 11.9 - 14.6 % Platelets 903 499 - 977 K/uL    MPV 10.1 9.4 - 12.3 FL    nRBC 0.00 0.0 - 0.2 K/uL   Comprehensive Metabolic Panel   Result Value Ref Range    Sodium 142 133 - 143 mmol/L    Potassium 3.7 3.5 - 5.1 mmol/L    Chloride 107 101 - 110 mmol/L    CO2 30 21 - 32 mmol/L    Anion Gap 5 2 - 11 mmol/L    Glucose 134 (H) 65 - 100 mg/dL    BUN 19 8 - 23 MG/DL    Creatinine 0.49 (L) 0.6 - 1.0 MG/DL    Est, Glom Filt Rate >60 >60 ml/min/1.73m2    Calcium 9.0 8.3 - 10.4 MG/DL    Total Bilirubin 0.6 0.2 - 1.1 MG/DL    ALT 24 12 - 65 U/L    AST 20 15 - 37 U/L    Alk Phosphatase 89 50 - 136 U/L    Total Protein 7.0 6.3 - 8.2 g/dL    Albumin 3.6 3.2 - 4.6 g/dL    Globulin 3.4 2.8 - 4.5 g/dL    Albumin/Globulin Ratio 1.1 0.4 - 1.6     Lipase   Result Value Ref Range    Lipase 117 73 - 393 U/L   Urinalysis   Result Value Ref Range    Color, UA YELLOW/STRAW      Appearance CLOUDY      Specific [place], UA 1.013 1.001 - 1.023      pH, Urine 8.5 5.0 - 9.0      Protein, UA Negative NEG mg/dL    Glucose, UA Negative mg/dL    Ketones, Urine Negative NEG mg/dL    Bilirubin Urine Negative NEG      Blood, Urine Negative NEG      Urobilinogen, Urine 0.2 0.2 - 1.0 EU/dL    Nitrite, Urine Negative NEG      Leukocyte Esterase, Urine Negative NEG     Troponin   Result Value Ref Range    Troponin, High Sensitivity 8.9 0 - 14 pg/mL   Brain Natriuretic Peptide   Result Value Ref Range    NT Pro- <450 PG/ML   EKG 12 Lead   Result Value Ref Range    Ventricular Rate 60 BPM    Atrial Rate 60 BPM    P-R Interval 174 ms    QRS Duration 102 ms    Q-T Interval 429 ms    QTc Calculation (Bazett) 429 ms    P Axis 70 degrees    R Axis 11 degrees    T Axis 63 degrees    Diagnosis Sinus rhythm         CT CHEST ABDOMEN PELVIS W CONTRAST Additional Contrast? None   Final Result         CHEST:   1. No evidence of pulmonary embolism. 2. Mild interstitial pulmonary edema with cardiomegaly.    3. Pulmonary nodules measuring up to 5 mm, not definitively seen on prior. If   the patient is considered high risk per Fleischner Society guidelines, follow-up   CT in one year is recommended. If not high risk, no further follow-up required. 4. Atherosclerosis including coronary artery involvement. 5. Several chronic appearing midthoracic vertebral body compression fractures   without comparison imaging to document stability. Correlate for point tenderness   to ensure no acute component. ABDOMEN AND PELVIS:   1. No acute process. 2. Moderate colonic stool burden with scattered diverticula. No evidence of   bowel obstruction, colitis, or diverticulitis. Status post appendectomy. 3.            XR CHEST PORTABLE   Final Result   Diffuse interstitial opacities could represent chronic interstitial lung   changes, pulmonary edema, or atypical infection. Voice dictation software was used during the making of this note. This software is not perfect and grammatical and other typographical errors may be present. This note has not been completely proofread for errors.      Lisa Ashraf MD  02/25/23 0766

## 2023-02-25 NOTE — PLAN OF CARE
Problem: ABCDS Injury Assessment  Goal: Absence of physical injury  Outcome: Progressing     Problem: Discharge Planning  Goal: Discharge to home or other facility with appropriate resources  Recent Flowsheet Documentation  Taken 2/25/2023 1640 by Steve Terry RN  Discharge to home or other facility with appropriate resources:   Identify barriers to discharge with patient and caregiver   Refer to discharge planning if patient needs post-hospital services based on physician order or complex needs related to functional status, cognitive ability or social support system   Identify discharge learning needs (meds, wound care, etc)

## 2023-02-25 NOTE — H&P
Hospitalist History and Physical   Admit Date:  2023  9:28 AM   Name:  Anselmo Hylton   Age:  80 y.o. Sex:  female  :  1927   MRN:  [id number]   Room:      Presenting Complaint: Abdominal Pain     Reason(s) for Admission: Pulmonary congestion [R09.89]     History of Present Illness:   Anselmo Hylton is a 80 y.o. female with medical history of HTN, HFpEF, PAF who presented with abdominal pain. She had pain for several days. In the ED CT CAP demonstrated pulmonary congestion. She has a history of DD. She was admitted to observation. Assessment & Plan:   Pulmonary congestion  History of diastolic dysfunction  -echocardiogram  -bumetanide    Primary hypertension  -lisinopril, metoprolol    Slow transit constipation  -soap suds enema  -psyllium, saccharomyces    Do not resuscitate status  Noted      PT/OT evals and PPD needed/ordered? Yes  Diet: No diet orders on file  VTE prophylaxis:  enoxaparin  Code status: No Order    Hospital Problems:  Principal Problem:    Pulmonary congestion  Active Problems:    Primary hypertension    PAF (paroxysmal atrial fibrillation) ([address]    DNR (do not resuscitate)  Resolved Problems:    * No resolved hospital problems.  *       Past History:     Past Medical History:   Diagnosis Date    Abnormal EKG 2021    Acute pulmonary edema ([address] 2018    Acute respiratory failure with hypoxia (HCC) 2018    Atrial fibrillation (HCC)     CAD (coronary artery disease)     afib    Chest pain 2016    Chest pain 3/33/5    Diastolic CHF, acute on chronic (HCC) 2/10/2018    RUBY (generalized anxiety disorder)     Gastrointestinal disorder irritable bowel    GERD (gastroesophageal reflux disease)     Hypertension     Hyponatremia     Metabolic encephalopathy     UTI (urinary tract infection)     UTI (urinary tract infection) 10/12/2019       Past Surgical History:   Procedure Laterality Date    CHOLECYSTECTOMY      Colostomy reversal ORTHOPEDIC SURGERY      left hip replacement, rt pelvic fx    OTHER SURGICAL HISTORY      Extensive abdominal surgical history        Social History     Tobacco Use    Smoking status: Never    Smokeless tobacco: Never   Substance Use Topics    Alcohol use: No      Social History     Substance and Sexual Activity   Drug Use No       History reviewed. No pertinent family history.      Immunization History   Administered Date(s) Administered    COVID-19, MODERNA BLUE border, Primary or Immunocompromised, (age 12y+), IM, 100 mcg/0.5mL 01/05/2021, 02/02/2021    PPD Test 08/20/2016, 02/11/2018, 10/14/2019     Allergies   Allergen Reactions    Aspirin Other (See Comments)    Cefpodoxime Other (See Comments)    Ciprofloxacin Other (See Comments)    Codeine Other (See Comments)    Diltiazem Other (See Comments)    Doxycycline Other (See Comments)    Hydrochlorothiazide Other (See Comments)    Nsaids Other (See Comments)    Oxycodone Other (See Comments)    Sulfa Antibiotics Other (See Comments)     Pt denies allergy    Tramadol Other (See Comments)     Prior to Admit Medications:  Current Outpatient Medications   Medication Instructions    acetaminophen (TYLENOL) 325 MG tablet Oral    cetirizine (ZYRTEC) 10 mg, Oral, DAILY    lisinopril (PRINIVIL;ZESTRIL) 20 mg, Oral, DAILY    Menthol-Methyl Salicylate (THERA-GESIC) 0.5-15 % CREA Topical, 3 TIMES DAILY    metoprolol succinate (TOPROL XL) 50 mg, Oral, 2 TIMES DAILY    montelukast (SINGULAIR) 10 mg, Oral, DAILY    nitrofurantoin (macrocrystal-monohydrate) (MACROBID) 100 mg, Oral, DAILY    omeprazole (PRILOSEC) 20 mg, Oral, DAILY    polyethylene glycol (GLYCOLAX) 17 g, Oral, DAILY    Premarin 0.5 g, Vaginal, DAILY    sodium chloride (OCEAN) 0.65 % nasal spray 1 spray, PRN         Objective:   Patient Vitals for the past 24 hrs:   Temp Pulse Resp BP SpO2   02/25/23 1110 -- 73 24 -- 95 %   02/25/23 1100 -- 71 21 (!) 210/163 95 %   02/25/23 1040 -- 64 12 (!) 189/88 --   02/25/23 0940 -- -- -- (!) 213/97 97 %   02/25/23 0935 97.5 °F (36.4 °C) 65 18 (!) 213/97 98 %       Oxygen Therapy  SpO2: 95 %  O2 Device: None (Room air)    Estimated body mass index is 27.34 kg/m² as calculated from the following:    Height as of this encounter: 5' (1.524 m). Weight as of this encounter: 140 lb (63.5 kg). No intake or output data in the 24 hours ending 02/25/23 1402      Blood pressure (!) 210/163, pulse 73, temperature 97.5 °F (36.4 °C), temperature source Oral, resp. rate 24, height 5' (1.524 m), weight 140 lb (63.5 kg), SpO2 95 %. Physical Exam  Vitals and nursing note reviewed. Constitutional:       General: She is not in acute distress. Appearance: She is not diaphoretic. Eyes:      Extraocular Movements: Extraocular movements intact. Cardiovascular:      Rate and Rhythm: Normal rate and regular rhythm. Pulmonary:      Effort: Pulmonary effort is normal. No respiratory distress. Breath sounds: No wheezing, rhonchi or rales. Abdominal:      General: Abdomen is flat. There is no distension. Palpations: Abdomen is soft. Tenderness: There is abdominal tenderness in the left upper quadrant and left lower quadrant. Musculoskeletal:         General: No deformity. Right lower leg: No edema. Left lower leg: No edema. Skin:     Coloration: Skin is not jaundiced or pale. Neurological:      General: No focal deficit present. Mental Status: She is alert and oriented to person, place, and time.    Psychiatric:         Mood and Affect: Mood normal.         Behavior: Behavior normal.      Comments: affable         I have personally reviewed labs and tests:  Recent Labs:  Recent Results (from the past 24 hour(s))   EKG 12 Lead    Collection Time: 02/25/23 10:38 AM   Result Value Ref Range    Ventricular Rate 60 BPM    Atrial Rate 60 BPM    P-R Interval 174 ms    QRS Duration 102 ms    Q-T Interval 429 ms    QTc Calculation (Bazett) 429 ms    P Axis 70 degrees    R Axis 11 degrees    T Axis 63 degrees    Diagnosis Sinus rhythm    CBC    Collection Time: 02/25/23 10:47 AM   Result Value Ref Range    WBC 6.1 4.3 - 11.1 K/uL    RBC 4.45 4.05 - 5.2 M/uL    Hemoglobin 13.8 11.7 - 15.4 g/dL    Hematocrit 42.0 35.8 - 46.3 %    MCV 94.4 82.0 - 102.0 FL    MCH 31.0 26.1 - 32.9 PG    MCHC 32.9 31.4 - 35.0 g/dL    RDW 13.4 11.9 - 14.6 %    Platelets 847 410 - 781 K/uL    MPV 10.1 9.4 - 12.3 FL    nRBC 0.00 0.0 - 0.2 K/uL   Comprehensive Metabolic Panel    Collection Time: 02/25/23 10:47 AM   Result Value Ref Range    Sodium 142 133 - 143 mmol/L    Potassium 3.7 3.5 - 5.1 mmol/L    Chloride 107 101 - 110 mmol/L    CO2 30 21 - 32 mmol/L    Anion Gap 5 2 - 11 mmol/L    Glucose 134 (H) 65 - 100 mg/dL    BUN 19 8 - 23 MG/DL    Creatinine 0.49 (L) 0.6 - 1.0 MG/DL    Est, Glom Filt Rate >60 >60 ml/min/1.73m2    Calcium 9.0 8.3 - 10.4 MG/DL    Total Bilirubin 0.6 0.2 - 1.1 MG/DL    ALT 24 12 - 65 U/L    AST 20 15 - 37 U/L    Alk Phosphatase 89 50 - 136 U/L    Total Protein 7.0 6.3 - 8.2 g/dL    Albumin 3.6 3.2 - 4.6 g/dL    Globulin 3.4 2.8 - 4.5 g/dL    Albumin/Globulin Ratio 1.1 0.4 - 1.6     Lipase    Collection Time: 02/25/23 10:47 AM   Result Value Ref Range    Lipase 117 73 - 393 U/L   Troponin    Collection Time: 02/25/23 10:47 AM   Result Value Ref Range    Troponin, High Sensitivity 8.9 0 - 14 pg/mL   Brain Natriuretic Peptide    Collection Time: 02/25/23 10:47 AM   Result Value Ref Range    NT Pro- <450 PG/ML   Procalcitonin    Collection Time: 02/25/23 10:47 AM   Result Value Ref Range    Procalcitonin <0.05 0.00 - 0.49 ng/mL   Urinalysis    Collection Time: 02/25/23 11:12 AM   Result Value Ref Range    Color, UA YELLOW/STRAW      Appearance CLOUDY      Specific [place], UA 1.013 1.001 - 1.023      pH, Urine 8.5 5.0 - 9.0      Protein, UA Negative NEG mg/dL    Glucose, UA Negative mg/dL    Ketones, Urine Negative NEG mg/dL    Bilirubin Urine Negative NEG      Blood, Urine Negative NEG      Urobilinogen, Urine 0.2 0.2 - 1.0 EU/dL    Nitrite, Urine Negative NEG      Leukocyte Esterase, Urine Negative NEG     Troponin    Collection Time: 02/25/23 12:56 PM   Result Value Ref Range    Troponin, High Sensitivity 10.1 0 - 14 pg/mL       I have personally reviewed imaging studies:  XR CHEST PORTABLE    Result Date: 2/25/2023  EXAM: XR CHEST PORTABLE INDICATION: sob COMPARISON: None FINDINGS: Cardiomediastinal silhouette is within normal limits for technique. Atherosclerotic calcifications in the aorta. No pleural effusion or pneumothorax. Diffusely increased interstitial opacities. No focal alveolar consolidation. No acute osseous abnormality. Multiple chronic right posterior rib fractures. Diffuse interstitial opacities could represent chronic interstitial lung changes, pulmonary edema, or atypical infection. CT CHEST ABDOMEN PELVIS W CONTRAST Additional Contrast? None    Result Date: 2/25/2023  EXAMINATION: CT CHEST, ABDOMEN, AND PELVIS 2/25/2023 11:51 AM ACCESSION NUMBER: [id number] INDICATION: Shortness of breath abdominal pain. COMPARISON: Chest radiograph, 2/25/2023. CT abdomen and pelvis, 5/4/2022 TECHNIQUE: Multiple contiguous axial CT images of the chest, abdomen, and pelvis were obtained from the thoracic inlet to the pubic symphysis after the intravenous administration of 100 mL Isovue-370 contrast material. Radiation dose reduction techniques were used for this study:  Our CT scanners use one or all of the following: Automated exposure control, adjustment of the mA and/or kVp according to patient's size, iterative reconstruction. FINDINGS: CHEST: LUNGS/PLEURA: Central airways are patent. Minimal bibasilar dependent subsegmental atelectasis. There is mild bilateral symmetric and basilar predominant interlobular septal thickening. No alveolar consolidation. No pleural effusion or pneumothorax.  4 mm peripheral solid nodule in the right middle lobe (axial image 62), not visualized on the prior but potentially related to differences in field of view. 5 mm solid nodule in the left lower lobe (axial image 36), region not covered on comparison. MEDIASTINUM: Visualized thyroid is normal. Thoracic esophagus is unremarkable. The heart is enlarged without pericardial effusion. Great vessels are normal in caliber with atherosclerotic calcifications including involvement of the coronary arteries. Evaluation for pulmonary emboli is diagnostic through the subsegmental level. No evidence of acute or chronic pulmonary embolism. BONES AND SOFT TISSUES: No axillary adenopathy. Regional soft tissues are unremarkable. Osteopenia. Multiple chronic healed rib fractures. There are multiple chronic appearing compression fracture within the mid thoracic spine involving T3, T8, T9, and T10. ABDOMEN/PELVIS: LIVER: Normal BILIARY: Status post cholecystectomy with unchanged pneumobilia likely related to prior biliary intervention. SPLEEN: No splenomegaly or concerning lesion. PANCREAS: No pancreatic duct dilation or mass. ADRENALS: No adrenal nodules or hypertrophy. URINARY: Kidneys are symmetric in size and enhancement. There are bilateral subcentimeter hypodensities which are too small to characterize but similar to prior and statistically cysts. No hydronephrosis or nephrolithiasis. Ureters are within normal limits. Urinary bladder is poorly visualized due to streak artifact but grossly normal on the metal artifact reduction reformat. BOWEL: The stomach, small bowel, and large bowel are normal in caliber and wall thickness. No inflammatory changes. Postsurgical changes involving the right colon and distal small bowel with patent appearing anastomosis in the right lower quadrant. Additional large bowel anastomosis in the rectosigmoid region. There is a moderate colorectal stool burden with scattered diverticula. Appendix is surgically absent.  VASCULAR: The abdominal aorta and iliac arterial system are nonaneurysmal with severe atherosclerotic calcifications. NODES: No lymphadenopathy. FLUID: No free intraperitoneal fluid. REPRODUCTIVE: Status post hysterectomy. BONES/SOFT TISSUES: Regional soft tissues are unremarkable. No acute or aggressive osseous normality. Chronic right pubic rami fractures. Bilateral total hip arthroplasties with resultant streak artifact. Chronic bilateral sacral sufficiency fractures. Multiple chronic compression fractures in the lower thoracic and lumbar spine are unchanged. CHEST: 1. No evidence of pulmonary embolism. 2. Mild interstitial pulmonary edema with cardiomegaly. 3. Pulmonary nodules measuring up to 5 mm, not definitively seen on prior. If the patient is considered high risk per Fleischner Society guidelines, follow-up CT in one year is recommended. If not high risk, no further follow-up required. 4. Atherosclerosis including coronary artery involvement. 5. Several chronic appearing midthoracic vertebral body compression fractures without comparison imaging to document stability. Correlate for point tenderness to ensure no acute component. ABDOMEN AND PELVIS: 1. No acute process. 2. Moderate colonic stool burden with scattered diverticula. No evidence of bowel obstruction, colitis, or diverticulitis. Status post appendectomy. 3.       Echocardiogram:  No results found for this or any previous visit.         Orders Placed This Encounter   Medications    iopamidol (ISOVUE-370) 76 % injection 100 mL    0.9 % sodium chloride bolus    furosemide (LASIX) injection 40 mg         Signed:  Nette Sánchez MD

## 2023-02-25 NOTE — ED TRIAGE NOTES
Patient arrives via [facility] coming from [facility] [address]. EMS reports pt c/o generalized abdominal pain, hurts \"all over\", and labored breathing. A/Ox4.     BP: 200/100, pt hx HTN, has not taken BP medication today  HR: 60's  O2 sat: 100% RA  BGL : 151

## 2023-02-25 NOTE — PROGRESS NOTES
Patient admitted to room. On Room air. [place] hooked to suction. Brief on. Feet are warm and dry. Trace edema in BLE. She reports some \"cramping\" upon touch to legs. Patient reports that abdominal pain has decreased and has no pain currently. She denies SOB. She says she has felt mild nausea all day but now denies nausea/vomiting.    Gripper socks applied. Call light within reach. Skin intact and dry and fragile. She reports living at the [facility]. She says that normally she walks to the bathroom without assistance but uses a raised toilet seat.

## 2023-02-26 ENCOUNTER — APPOINTMENT (OUTPATIENT)
Dept: NON INVASIVE DIAGNOSTICS | Age: 88
End: 2023-02-26
Payer: OTHER GOVERNMENT

## 2023-02-26 ENCOUNTER — APPOINTMENT (OUTPATIENT)
Dept: ULTRASOUND IMAGING | Age: 88
End: 2023-02-26
Payer: OTHER GOVERNMENT

## 2023-02-26 VITALS
HEART RATE: 100 BPM | HEIGHT: 60 IN | BODY MASS INDEX: 27.48 KG/M2 | TEMPERATURE: 97.7 F | WEIGHT: 140 LBS | SYSTOLIC BLOOD PRESSURE: 155 MMHG | OXYGEN SATURATION: 94 % | DIASTOLIC BLOOD PRESSURE: 92 MMHG | RESPIRATION RATE: 18 BRPM

## 2023-02-26 PROBLEM — U09.9 COVID-19 LONG HAULER MANIFESTING CHRONIC NEUROLOGIC SYMPTOMS: Chronic | Status: ACTIVE | Noted: 2023-02-26

## 2023-02-26 PROBLEM — R73.03 PREDIABETES: Status: ACTIVE | Noted: 2023-02-26

## 2023-02-26 PROBLEM — R29.90 COVID-19 LONG HAULER MANIFESTING CHRONIC NEUROLOGIC SYMPTOMS: Chronic | Status: ACTIVE | Noted: 2023-02-26

## 2023-02-26 PROBLEM — R09.89 PULMONARY CONGESTION: Status: RESOLVED | Noted: 2023-02-25 | Resolved: 2023-02-26

## 2023-02-26 LAB
ALBUMIN SERPL-MCNC: 3.8 G/DL (ref 3.2–4.6)
ANION GAP SERPL CALC-SCNC: 7 MMOL/L (ref 2–11)
BUN SERPL-MCNC: 16 MG/DL (ref 8–23)
CALCIUM SERPL-MCNC: 9.4 MG/DL (ref 8.3–10.4)
CHLORIDE SERPL-SCNC: 104 MMOL/L (ref 101–110)
CHOLEST SERPL-MCNC: 243 MG/DL
CO2 SERPL-SCNC: 27 MMOL/L (ref 21–32)
CREAT SERPL-MCNC: 0.54 MG/DL (ref 0.6–1)
ECHO AO ASC DIAM: 3.4 CM
ECHO AO ASCENDING AORTA INDEX: 2.13 CM/M2
ECHO AO ROOT DIAM: 3.3 CM
ECHO AO ROOT INDEX: 2.06 CM/M2
ECHO AR MAX VEL PISA: 4 M/S
ECHO AV AREA PEAK VELOCITY: 2.1 CM2
ECHO AV AREA VTI: 1.9 CM2
ECHO AV AREA/BSA PEAK VELOCITY: 1.3 CM2/M2
ECHO AV AREA/BSA VTI: 1.2 CM2/M2
ECHO AV MEAN GRADIENT: 10 MMHG
ECHO AV MEAN VELOCITY: 1.5 M/S
ECHO AV PEAK GRADIENT: 22 MMHG
ECHO AV PEAK VELOCITY: 2.3 M/S
ECHO AV REGURGITANT PHT: 584 MS
ECHO AV VELOCITY RATIO: 0.74
ECHO AV VTI: 44.1 CM
ECHO BSA: 1.64 M2
ECHO EST RA PRESSURE: 3 MMHG
ECHO IVC EXP: 1.2 CM
ECHO LA AREA 2C: 20.1 CM2
ECHO LA AREA 4C: 21.7 CM2
ECHO LA DIAMETER INDEX: 2.31 CM/M2
ECHO LA DIAMETER: 3.7 CM
ECHO LA MAJOR AXIS: 6 CM
ECHO LA MINOR AXIS: 5.4 CM
ECHO LA TO AORTIC ROOT RATIO: 1.12
ECHO LA VOL 2C: 61 ML (ref 22–52)
ECHO LA VOL 4C: 63 ML (ref 22–52)
ECHO LA VOL BP: 65 ML (ref 22–52)
ECHO LA VOL/BSA BIPLANE: 41 ML/M2 (ref 16–34)
ECHO LA VOLUME INDEX A2C: 38 ML/M2 (ref 16–34)
ECHO LA VOLUME INDEX A4C: 39 ML/M2 (ref 16–34)
ECHO LV E' LATERAL VELOCITY: 10 CM/S
ECHO LV E' SEPTAL VELOCITY: 6 CM/S
ECHO LV EDV A2C: 53 ML
ECHO LV EDV A4C: 49 ML
ECHO LV EDV INDEX A4C: 31 ML/M2
ECHO LV EDV NDEX A2C: 33 ML/M2
ECHO LV EJECTION FRACTION A2C: 67 %
ECHO LV EJECTION FRACTION A4C: 67 %
ECHO LV EJECTION FRACTION BIPLANE: 66 % (ref 55–100)
ECHO LV ESV A2C: 18 ML
ECHO LV ESV A4C: 16 ML
ECHO LV ESV INDEX A2C: 11 ML/M2
ECHO LV ESV INDEX A4C: 10 ML/M2
ECHO LV FRACTIONAL SHORTENING: 33 % (ref 28–44)
ECHO LV INTERNAL DIMENSION DIASTOLE INDEX: 2.44 CM/M2
ECHO LV INTERNAL DIMENSION DIASTOLIC: 3.9 CM (ref 3.9–5.3)
ECHO LV INTERNAL DIMENSION SYSTOLIC INDEX: 1.63 CM/M2
ECHO LV INTERNAL DIMENSION SYSTOLIC: 2.6 CM
ECHO LV IVSD: 1.3 CM (ref 0.6–0.9)
ECHO LV MASS 2D: 179.7 G (ref 67–162)
ECHO LV MASS INDEX 2D: 112.3 G/M2 (ref 43–95)
ECHO LV POSTERIOR WALL DIASTOLIC: 1.3 CM (ref 0.6–0.9)
ECHO LV RELATIVE WALL THICKNESS RATIO: 0.67
ECHO LVOT AREA: 2.8 CM2
ECHO LVOT AV VTI INDEX: 0.68
ECHO LVOT DIAM: 1.9 CM
ECHO LVOT MEAN GRADIENT: 6 MMHG
ECHO LVOT PEAK GRADIENT: 11 MMHG
ECHO LVOT PEAK VELOCITY: 1.7 M/S
ECHO LVOT STROKE VOLUME INDEX: 53.1 ML/M2
ECHO LVOT SV: 85 ML
ECHO LVOT VTI: 30 CM
ECHO MV A VELOCITY: 1.13 M/S
ECHO MV AREA VTI: 2.8 CM2
ECHO MV E DECELERATION TIME (DT): 269 MS
ECHO MV E VELOCITY: 0.84 M/S
ECHO MV E/A RATIO: 0.74
ECHO MV E/E' LATERAL: 8.4
ECHO MV E/E' RATIO (AVERAGED): 11.2
ECHO MV E/E' SEPTAL: 14
ECHO MV LVOT VTI INDEX: 1.01
ECHO MV MAX VELOCITY: 1.2 M/S
ECHO MV MEAN GRADIENT: 3 MMHG
ECHO MV MEAN VELOCITY: 0.8 M/S
ECHO MV PEAK GRADIENT: 5 MMHG
ECHO MV VTI: 30.4 CM
ECHO PV ACCELERATION TIME (AT): 109 MS
ECHO PV MAX VELOCITY: 0.9 M/S
ECHO PV PEAK GRADIENT: 4 MMHG
ECHO RIGHT VENTRICULAR SYSTOLIC PRESSURE (RVSP): 37 MMHG
ECHO RV BASAL DIMENSION: 3 CM
ECHO RV FREE WALL PEAK S': 12 CM/S
ECHO RV TAPSE: 1.8 CM (ref 1.7–?)
ECHO TV REGURGITANT MAX VELOCITY: 2.92 M/S
ECHO TV REGURGITANT PEAK GRADIENT: 34 MMHG
EKG ATRIAL RATE: 60 BPM
EKG DIAGNOSIS: NORMAL
EKG P AXIS: 70 DEGREES
EKG P-R INTERVAL: 174 MS
EKG Q-T INTERVAL: 429 MS
EKG QRS DURATION: 102 MS
EKG QTC CALCULATION (BAZETT): 429 MS
EKG R AXIS: 11 DEGREES
EKG T AXIS: 63 DEGREES
EKG VENTRICULAR RATE: 60 BPM
ERYTHROCYTE [DISTWIDTH] IN BLOOD BY AUTOMATED COUNT: 13.3 % (ref 11.9–14.6)
EST. AVERAGE GLUCOSE BLD GHB EST-MCNC: 134 MG/DL
GLUCOSE SERPL-MCNC: 165 MG/DL (ref 65–100)
HBA1C MFR BLD: 6.3 % (ref 4.8–5.6)
HCT VFR BLD AUTO: 42.7 % (ref 35.8–46.3)
HDLC SERPL-MCNC: 68 MG/DL (ref 40–60)
HDLC SERPL: 3.6
HGB BLD-MCNC: 14.3 G/DL (ref 11.7–15.4)
LDLC SERPL CALC-MCNC: 140.2 MG/DL
LV EF: 68 %
LVEF MODALITY: ABNORMAL
MAGNESIUM SERPL-MCNC: 2 MG/DL (ref 1.8–2.4)
MCH RBC QN AUTO: 31.1 PG (ref 26.1–32.9)
MCHC RBC AUTO-ENTMCNC: 33.5 G/DL (ref 31.4–35)
MCV RBC AUTO: 92.8 FL (ref 82–102)
NRBC # BLD: 0 K/UL (ref 0–0.2)
PHOSPHATE SERPL-MCNC: 3 MG/DL (ref 2.3–3.7)
PLATELET # BLD AUTO: 180 K/UL (ref 150–450)
PMV BLD AUTO: 10 FL (ref 9.4–12.3)
POTASSIUM SERPL-SCNC: 3.5 MMOL/L (ref 3.5–5.1)
PROCALCITONIN SERPL-MCNC: <0.05 NG/ML (ref 0–0.49)
RBC # BLD AUTO: 4.6 M/UL (ref 4.05–5.2)
SODIUM SERPL-SCNC: 138 MMOL/L (ref 133–143)
TRIGL SERPL-MCNC: 174 MG/DL (ref 35–150)
TSH W FREE THYROID IF ABNORMAL: 1.62 UIU/ML (ref 0.36–3.74)
VLDLC SERPL CALC-MCNC: 34.8 MG/DL (ref 6–23)
WBC # BLD AUTO: 6.1 K/UL (ref 4.3–11.1)

## 2023-02-26 PROCEDURE — 97530 THERAPEUTIC ACTIVITIES: CPT

## 2023-02-26 PROCEDURE — 93306 TTE W/DOPPLER COMPLETE: CPT

## 2023-02-26 PROCEDURE — 6370000000 HC RX 637 (ALT 250 FOR IP): Performed by: FAMILY MEDICINE

## 2023-02-26 PROCEDURE — 84443 ASSAY THYROID STIM HORMONE: CPT

## 2023-02-26 PROCEDURE — 84100 ASSAY OF PHOSPHORUS: CPT

## 2023-02-26 PROCEDURE — 93306 TTE W/DOPPLER COMPLETE: CPT | Performed by: INTERNAL MEDICINE

## 2023-02-26 PROCEDURE — 36415 COLL VENOUS BLD VENIPUNCTURE: CPT

## 2023-02-26 PROCEDURE — 80061 LIPID PANEL: CPT

## 2023-02-26 PROCEDURE — G0378 HOSPITAL OBSERVATION PER HR: HCPCS

## 2023-02-26 PROCEDURE — 93970 EXTREMITY STUDY: CPT

## 2023-02-26 PROCEDURE — 97161 PT EVAL LOW COMPLEX 20 MIN: CPT

## 2023-02-26 PROCEDURE — 85027 COMPLETE CBC AUTOMATED: CPT

## 2023-02-26 PROCEDURE — 2580000003 HC RX 258: Performed by: FAMILY MEDICINE

## 2023-02-26 PROCEDURE — 83036 HEMOGLOBIN GLYCOSYLATED A1C: CPT

## 2023-02-26 PROCEDURE — 82040 ASSAY OF SERUM ALBUMIN: CPT

## 2023-02-26 PROCEDURE — 84145 PROCALCITONIN (PCT): CPT

## 2023-02-26 PROCEDURE — 83735 ASSAY OF MAGNESIUM: CPT

## 2023-02-26 PROCEDURE — 80048 BASIC METABOLIC PNL TOTAL CA: CPT

## 2023-02-26 RX ORDER — SENNA AND DOCUSATE SODIUM 50; 8.6 MG/1; MG/1
1 TABLET, FILM COATED ORAL 2 TIMES DAILY
Qty: 60 TABLET | Refills: 0 | Status: SHIPPED | OUTPATIENT
Start: 2023-02-26 | End: 2023-03-28

## 2023-02-26 RX ORDER — BUMETANIDE 0.5 MG/1
0.5 TABLET ORAL DAILY
Qty: 30 TABLET | Refills: 0 | Status: SHIPPED | OUTPATIENT
Start: 2023-02-26 | End: 2023-03-28

## 2023-02-26 RX ORDER — L. ACIDOPHILUS/L.BULGARICUS 1MM CELL
4 TABLET ORAL 2 TIMES DAILY
Qty: 240 TABLET | Refills: 0 | Status: SHIPPED | OUTPATIENT
Start: 2023-02-26 | End: 2023-03-28

## 2023-02-26 RX ADMIN — PSYLLIUM HUSK 1 PACKET: 3.4 POWDER ORAL at 08:01

## 2023-02-26 RX ADMIN — SODIUM CHLORIDE, PRESERVATIVE FREE 10 ML: 5 INJECTION INTRAVENOUS at 08:02

## 2023-02-26 RX ADMIN — ZINC 1 TABLET: TAB ORAL at 08:01

## 2023-02-26 RX ADMIN — SENNOSIDES AND DOCUSATE SODIUM 1 TABLET: 50; 8.6 TABLET ORAL at 08:01

## 2023-02-26 RX ADMIN — LISINOPRIL 20 MG: 20 TABLET ORAL at 08:01

## 2023-02-26 RX ADMIN — POLYETHYLENE GLYCOL 3350 17 G: 17 POWDER, FOR SOLUTION ORAL at 08:01

## 2023-02-26 RX ADMIN — METOPROLOL SUCCINATE 50 MG: 50 TABLET, EXTENDED RELEASE ORAL at 08:01

## 2023-02-26 RX ADMIN — LACTOBACILLUS TAB 4 TABLET: TAB at 08:01

## 2023-02-26 NOTE — DISCHARGE INSTRUCTIONS
Pulmonary Edema: Care Instructions  Overview     Pulmonary edema is the buildup of fluid in the lungs. It usually occurs when the heart does not pump blood through the body properly. Pulmonary edema can also be caused by another disease, such as liver or kidney failure. It can also happen at high altitudes, from a poisoning, or as a result of a nonfatal drowning. If you have fluid in your lungs, you may have trouble breathing, be restless, have a fast heart rate, or cough up foamy pink fluid. Breathing problems may be worse when you lie down. Follow-up care is a key part of your treatment and safety. Be sure to make and go to all appointments, and call your doctor if you are having problems. It's also a good idea to know your test results and keep a list of the medicines you take. How can you care for yourself at home? Medicines    Take your medicines exactly as prescribed. Call your doctor if you think you are having a problem with your medicine. Review all of your regular medicines with your doctor. Do not take any vitamins, over-the-counter medicines, or herbal products without talking to your doctor first.   Diet    Eat a balanced diet. Make an appointment with a dietitian if you have questions about what type of diet might be best for you. Do not eat more than 2,000 milligrams (mg) of sodium each day. That is less than 1 teaspoon of salt a day, including all the salt you eat in prepared or packaged foods. Do not add salt while you are cooking or at the table. Flavor with garlic, lemon juice, onion, vinegar, herbs, and spices instead of salt. Eat fewer processed foods and foods from restaurants, including fast food. Use fresh or frozen foods instead of canned. Count and record how much sodium you eat each day. Check food labels for sodium. Ask your doctor before using salt substitutes that have potassium, such as Lite Salt.    Lifestyle    Stay out of air pollution; smog; cold, dry air; hot, humid air; and high altitudes. Learn breathing methods that help the airflow in and out of your lungs. Take rest breaks often. Schedule short rest breaks when doing housework and other activities. An occupational or physical therapist can help you find ways to do everyday activities with less effort. Start light exercise if your doctor says it is okay. Try to stay as active as possible. If you have not exercised in the past, start out slowly. Walking is a good way to start. Get enough rest at night. Sleeping with 1 or 2 pillows under your upper body and head may help you breathe easier at night. Discuss rehabilitation with your doctor. Find out what programs are available in your area. Do not smoke or use other tobacco products. Smoking can make your condition worse. If you need help quitting, talk to your doctor about stop-smoking programs and medicines. These can increase your chances of quitting for good. Do not use alcohol or illegal drugs. When should you call for help? Call 911 anytime you think you may need emergency care. For example, call if:    You have severe trouble breathing. You passed out (lost consciousness). You have symptoms of a heart attack. These may include:  Chest pain or pressure, or a strange feeling in the chest.  Sweating. Shortness of breath. Nausea or vomiting. Pain, pressure, or a strange feeling in the back, neck, jaw, or upper belly or in one or both shoulders or arms. Lightheadedness or sudden weakness. A fast or irregular heartbeat. Pain that spreads from the chest to the neck, jaw, or one or both shoulders or arms. After you call 911, the  may tell you to chew 1 adult-strength or 2 to 4 low-dose aspirin. Wait for an ambulance. Do not try to drive yourself. Call your doctor now or seek immediate medical care if:    You have trouble breathing or have wheezing that is getting worse. You are coughing more deeply or more often. You cough up blood. You get a fever. You have more swelling in your legs or belly. Your symptoms are getting worse. Watch closely for changes in your health, and be sure to contact your doctor if you have any problems. Current as of: March 9, 2022               Content Version: 13.5  © 2006-2022 Healthwise, [employer]. Care instructions adapted under license by [facility] ([facility]). If you have questions about a medical condition or this instruction, always ask your healthcare professional. [place] [address] any warranty or liability for your use of this information. Constipation: Care Instructions  Overview     Constipation means that you have a hard time passing stools (bowel movements). People pass stools from 3 times a day to once every 3 days. What is normal for you may be different. Constipation may occur with pain in the rectum and cramping. The pain may get worse when you try to pass stools. Sometimes there are small amounts of bright red blood on toilet paper or the surface of stools. This is because of enlarged veins near the rectum (hemorrhoids). A few changes in your diet and lifestyle may help you avoid ongoing constipation. Your doctor may also prescribe medicine to help loosen your stool. Some medicines can cause constipation. These include pain medicines and antidepressants. Tell your doctor about all the medicines you take. Your doctor may want to make a medicine change to ease your symptoms. Follow-up care is a key part of your treatment and safety. Be sure to make and go to all appointments, and call your doctor if you are having problems. It's also a good idea to know your test results and keep a list of the medicines you take. How can you care for yourself at home? Drink plenty of fluids. If you have kidney, heart, or liver disease and have to limit fluids, talk with your doctor before you increase the amount of fluids you drink.   Include high-fiber foods in your diet each day. These include fruits, vegetables, beans, and whole grains. Get at least 30 minutes of exercise on most days of the week. Walking is a good choice. You also may want to do other activities, such as running, swimming, cycling, or playing tennis or team sports. Take a fiber supplement, such as Citrucel or Metamucil, every day. Read and follow all instructions on the label. Schedule time each day for a bowel movement. A daily routine may help. Take your time having a bowel movement, but don't sit for more than 10 minutes at a time. And don't strain too much. Support your feet with a small step stool when you sit on the toilet. This helps flex your hips and places your pelvis in a squatting position. Your doctor may recommend an over-the-counter laxative to relieve your constipation. Examples are Milk of Magnesia and MiraLax. Read and follow all instructions on the label. Do not use laxatives on a long-term basis. When should you call for help? Call your doctor now or seek immediate medical care if:    You have new or worse belly pain. You have new or worse nausea or vomiting. You have blood in your stools. Watch closely for changes in your health, and be sure to contact your doctor if:    Your constipation is getting worse. You do not get better as expected. Where can you learn more? Go to http://www.woods.com/ and enter P343 to learn more about \"Constipation: Care Instructions. \"  Current as of: June 6, 2022               Content Version: 13.5  © 2006-2022 Healthwise, [employer]. Care instructions adapted under license by [facility] ([facility]). If you have questions about a medical condition or this instruction, always ask your healthcare professional. [place] [address] any warranty or liability for your use of this information.

## 2023-02-26 NOTE — CARE COORDINATION
80 yr-old F with  abdominal pain. She lives at [facility]. She had a bowel movement and her symptoms improved. PT/OT notes she is very mobile with supervision and uses a RW at her [facility]. She is able to manage her shoes, toileting, and hygiene. No HH therapy needed. .  Plan is to return to [facility]. Spoke with son Prieto Lyles who will transport her back. 02/26/23 1013   Service Assessment   Patient Orientation Alert and Oriented   Cognition Alert   History Provided By Patient   Primary Caregiver Other (Comment)  ([facility])   Support Systems Family Members   Patient's Healthcare Decision Maker is: Named in [address] [place] [address]   PCP Verified by CM Yes   Last Visit to PCP Within last 3 months   Prior Functional Level Independent in ADLs/IADLs   Current Functional Level Independent in ADLs/IADLs   Can patient return to prior living arrangement Yes   Ability to make needs known: Good   Family able to assist with home care needs: Yes   Would you like for me to discuss the discharge plan with any other family members/significant others, and if so, who?  No   Financial Resources Other (Comment)  ([facility])   Community Resources Other (Comment)   Social/Functional History   Type of Home Assisted living  ([facility])   Bathroom Shower/Tub Walk-in shower   [facility]

## 2023-02-26 NOTE — PROGRESS NOTES
Occupational Therapy Note:    Attempted to see patient this PM for occupational therapy evaluation session. Attempted patient 2 times yet both times patient completing testing. Will follow and re-attempt as schedule permits/patient available.  Thank you,    Elsa Parker, OT    [facility]hab [facility]

## 2023-02-26 NOTE — PROGRESS NOTES
ACUTE PHYSICAL THERAPY GOALS:   (Developed with and agreed upon by patient and/or caregiver.)   Patient will ambulate with supervision for room distances with the least restrictive device needed within 1 treatment day.  -GOAL MET 2/26/23     PHYSICAL THERAPY Initial Assessment, Discharge, and AM  (Link to Caseload Tracking: PT Visit Days : 1  Acknowledge Orders  Time In/Out  PT Charge Capture  Rehab Caseload Tracker    Eun Girard is a 89 y.o. female   PRIMARY DIAGNOSIS: Pulmonary congestion  Pulmonary congestion [R09.89]  Acute pulmonary edema (HCC) [J81.0]  Essential hypertension [I10]  Generalized abdominal pain [R10.84]  Constipation, unspecified constipation type [K59.00]       Reason for Referral: Generalized Muscle Weakness (M62.81)  Observation: Payor:  EAST / Plan:  EAST / Product Type: *No Product type* /     ASSESSMENT:     REHAB RECOMMENDATIONS:   Recommendation to date pending progress:  Setting:  No further skilled therapy after discharge from hospital    Equipment:    None     ASSESSMENT:  Ms. Girard admitted with above diagnoses.  Patient demonstrates generalized weakness but is very mobile with supervision at most.  Patient uses a RW as needed in her JENIFER.  She used one to ambulate in the room during today's therapy session but did not use it once in the bathroom, relying on the sink or grab bars for support.  Patient able to manage her shoes, toileting, and hygiene.  She did not demonstrate any acute or d/c therapy needs.  Recommend patient return to her JENIFER at d/c.     [facility] AM-PAC™ “6 Clicks” Basic Mobility Inpatient Short Form  AM-PAC Basic Mobility - Inpatient   How much help is needed turning from your back to your side while in a flat bed without using bedrails?: A Little  How much help is needed moving from lying on your back to sitting on the side of a flat bed without using bedrails?: A Little  How much help is needed moving to and from a bed to a chair?:  None  How much help is needed standing up from a chair using your arms?: None  How much help is needed walking in hospital room?: None  How much help is needed climbing 3-5 steps with a railing?: None  AM-PAC Inpatient Mobility Raw Score : 22  AM-PAC Inpatient T-Scale Score : 53.28  Mobility Inpatient CMS 0-100% Score: 20.91  Mobility Inpatient CMS G-Code Modifier : CJ    SUBJECTIVE:   Ms. Stephens Duane agreeable. Needing to toilet.     Social/Functional Type of Home: Assisted living ([facility])  Bathroom Shower/Tub: Walk-in shower  Bathroom Equipment: Built-in shower seat  Home Equipment: Marcellus Verduzco, rolling    OBJECTIVE:     PAIN: VITALS / O2: PRECAUTION / Hart Leeks / DRAINS:   Pre Treatment:   Pain Assessment: None - Denies Pain      Post Treatment: 0 Vitals        Oxygen  O2 Device: None (Room air)   None    RESTRICTIONS/PRECAUTIONS:                    GROSS EVALUATION: Intact Impaired (Comments):   AROM [x]     PROM []    Strength []  Generally decreased,  functional   Balance [] Sitting - Static: Good  Sitting - Dynamic: Good  Standing - Static: Good  Standing - Dynamic: Fair, +   Posture [] Forward Head  Rounded Shoulders   Sensation []     Coordination [x]      Tone [x]     Edema []    Activity Tolerance [x]      []      COGNITION/  PERCEPTION: Intact Impaired (Comments):   Orientation [] Oriented to person, Oriented to place, Oriented to situation   Vision []     Hearing []     Cognition  []       MOBILITY: I Mod I S SBA CGA Min Mod Max Total  NT x2 Comments:   Bed Mobility    Rolling [] [] [] [] [] [] [] [] [] [] []    Supine to Sit [] [] [x] [] [] [] [] [] [] [] []    Scooting [] [] [x] [] [] [] [] [] [] [] []    Sit to Supine [] [] [] [] [] [] [] [] [] [] []    Transfers    Sit to Stand [] [] [x] [] [] [] [] [] [] [] []    Bed to Chair [] [] [x] [] [] [] [] [] [] [] []    Stand to Sit [] [] [x] [] [] [] [] [] [] [] []    Toilet  [] [] [x] [] [] [] [] [] [] [] []    I=Independent, Mod I=Modified Independent, S=Supervision, SBA=Standby Assistance, CGA=Contact Guard Assistance,   Min=Minimal Assistance, Mod=Moderate Assistance, Max=Maximal Assistance, Total=Total Assistance, NT=Not Tested    GAIT: I Mod I S SBA CGA Min Mod Max Total  NT x2 Comments:   Level of Assistance [] [] [x] [] [] [] [] [] [] [] []    Distance   Room distance x 2    DME Rolling Walker    Gait Quality Decreased rena , Decreased step length, and Trunk flexion    Weightbearing Status      Stairs      I=Independent, Mod I=Modified Independent, S=Supervision, SBA=Standby Assistance, CGA=Contact Guard Assistance,   Min=Minimal Assistance, Mod=Moderate Assistance, Max=Maximal Assistance, Total=Total Assistance, NT=Not Tested    PLAN:   FREQUENCY AND DURATION:   evaluation/discharge    THERAPY PROGNOSIS: Good    PROBLEM LIST:   (Skilled intervention is medically necessary to address:)  Decreased Strength INTERVENTIONS PLANNED:   (Benefits and precautions of physical therapy have been discussed with the patient.)  evaluation       TREATMENT:   EVALUATION: LOW COMPLEXITY: (Untimed Charge)    TREATMENT:   Therapeutic Activity (35 Minutes): Therapeutic activity included Supine to Sit, Scooting, Transfer Training, Ambulation on level ground, Sitting balance , Standing balance, and lower body dressing, toileting, and hygiene to improve functional Activity tolerance, Balance, Coordination, Mobility, Strength, and ROM. TREATMENT GRID:  N/A    AFTER TREATMENT PRECAUTIONS: Bed/Chair Locked, Call light within reach, Chair, Needs within reach, and RN notified    INTERDISCIPLINARY COLLABORATION:  RN/ PCT and RN Case Manager/      EDUCATION: Education Given To: Patient  Education Provided: Role of Therapy;Plan of Care;Transfer Training; Fall Prevention Strategies  Education Method: Verbal  Education Outcome: Verbalized understanding    TIME IN/OUT:  Time In: 2264  Time Out: 7085  Minutes: 3947 Jg Boles, PT

## 2023-02-26 NOTE — PROGRESS NOTES
Discharge instructions discussed with patient and son  All Ivs removed  Opportunity for questions provided.   Prescriptions written scripts given to patient  Returning to [facility]

## 2023-02-26 NOTE — DISCHARGE SUMMARY
Hospitalist Discharge Summary   Admit Date:  2023  9:28 AM   DC Note date: 2023  Name:  Sukumar Ba   Age:  80 y.o. Sex:  female  :  1927   MRN:  [id number]   Room:  [facility]  PCP:  Danuta Simmons MD    Presenting Complaint: Abdominal Pain     Initial Admission Diagnosis: Pulmonary congestion [R09.89]  Acute pulmonary edema ([address] [J81.0]  Essential hypertension [I10]  Generalized abdominal pain [R10.84]  Constipation, unspecified constipation type [K59.00]     Problem List for this Hospitalization (present on admission):    Principal Problem (Resolved):    Pulmonary congestion  Active Problems:    Primary hypertension    Slow transit constipation    Chronic heart failure with preserved ejection fraction ([address]    Prediabetes    COVID-19 long hauler manifesting chronic neurologic symptoms    Paroxysmal atrial fibrillation (HCC)    Muscle weakness (generalized)    Do not resuscitate status      Hospital Course:  89F PMHx HTN, HFpEF, PAF who presented with abdominal pain. She had pain for several days. In the ED CT CAP demonstrated pulmonary congestion. She has a history of DD. She was admitted to observation. She had a bowel movement and her symptoms were improved. Her son confirmed she has had intermittent progressive confusion since having COVID in January. He also confirmed a preference for OP FU of pulmonary congestion as it has been ongoing, chronic and she had not required oxygen. As such, she was determined to be appropriate for discharge . Disposition: Home with outpatient follow up  Diet: ADULT DIET; Regular  Code Status: DNR    Follow Ups:   Follow-up Information     [facility] CARDIOLOGY. Schedule an appointment as soon as possible for a   visit in 1 week(s).     Specialty: Cardiology  Why: Routine progression of care  Contact information:  [address] [place] [address] [facility]]           Danuta Simmons MD. Schedule an appointment as soon as possible   for a visit in 1 week(s). Why: Transition of Care Management  Contact information:  Shayan Hernandez  [phone]                       Time spent in patient discharge and coordination 44 minutes. Follow up labs/diagnostics (ultimately defer to outpatient provider):  Cardiology referral  Echocardiogram  Medication changes noted below  Chronic constipation    Plan was discussed with patient, son, nurse, . All questions answered. Patient was stable at time of discharge. Instructions given to call a physician or return if any concerns.     Current Discharge Medication List        START taking these medications    Details   lactobacillus acidophilus [facility]) Take 4 tablets by mouth 2 times daily  Qty: 240 tablet, Refills: 0      bumetanide (BUMEX) 0.5 MG tablet Take 1 tablet by mouth daily  Qty: 30 tablet, Refills: 0      psyllium (METAMUCIL) 58.12 % PACK packet Take 1 packet by mouth daily  Qty: 30 packet, Refills: 0      sennosides-docusate sodium (SENOKOT-S) 8.6-50 MG tablet Take 1 tablet by mouth 2 times daily  Qty: 60 tablet, Refills: 0           CONTINUE these medications which have NOT CHANGED    Details   acetaminophen (TYLENOL) 325 MG tablet Take by mouth      cetirizine (ZYRTEC) 10 MG tablet Take 10 mg by mouth daily      conjugated estrogens (PREMARIN) 0.625 MG/GM vaginal cream Place 0.5 g vaginally daily      lisinopril (PRINIVIL;ZESTRIL) 20 MG tablet Take 20 mg by mouth daily      Menthol-Methyl Salicylate (THERA-GESIC) 0.5-15 % CREA Apply topically 3 times daily      metoprolol succinate (TOPROL XL) 50 MG extended release tablet Take 50 mg by mouth 2 times daily      montelukast (SINGULAIR) 10 MG tablet Take 10 mg by mouth daily      nitrofurantoin, macrocrystal-monohydrate, (MACROBID) 100 MG capsule Take 100 mg by mouth daily      omeprazole (PRILOSEC) 20 MG delayed release capsule Take 20 mg by mouth daily      polyethylene glycol (GLYCOLAX) 17 GM/SCOOP powder Take 17 g by mouth daily      sodium chloride (OCEAN) 0.65 % nasal spray 1 spray as needed             Some medications may have been reported old/obsolete and marked \"stop taking\" by the system; in reality pt was already off these meds; defer to outpatient or prescribing providers.    Procedures done this admission:  * No surgery found *    Consults this admission:  None    Echocardiogram results:  No results found for this or any previous visit.      Diagnostic Imaging/Tests:   XR CHEST PORTABLE    Result Date: 2/25/2023  Diffuse interstitial opacities could represent chronic interstitial lung changes, pulmonary edema, or atypical infection.     CT CHEST ABDOMEN PELVIS W CONTRAST Additional Contrast? None    Result Date: 2/25/2023  CHEST: 1. No evidence of pulmonary embolism. 2. Mild interstitial pulmonary edema with cardiomegaly. 3. Pulmonary nodules measuring up to 5 mm, not definitively seen on prior. If the patient is considered high risk per Fleischner Society guidelines, follow-up CT in one year is recommended. If not high risk, no further follow-up required. 4. Atherosclerosis including coronary artery involvement. 5. Several chronic appearing midthoracic vertebral body compression fractures without comparison imaging to document stability. Correlate for point tenderness to ensure no acute component. ABDOMEN AND PELVIS: 1. No acute process. 2. Moderate colonic stool burden with scattered diverticula. No evidence of bowel obstruction, colitis, or diverticulitis. Status post appendectomy. 3.        Labs: Results:       BMP, Mg, Phos Recent Labs     02/25/23  1047 02/25/23  1256 02/26/23  0414     --  138   K 3.7  --  3.5     --  104   CO2 30  --  27   ANIONGAP 5  --  7   BUN 19  --  16   CREATININE 0.49*  --  0.54*   LABGLOM >60  --  >60   CALCIUM 9.0  --  9.4   GLUCOSE 134*  --  165*   MG  --  2.0 2.0   PHOS  --   --  3.0      CBC Recent Labs  02/25/23  1047 02/26/23  0414   WBC 6.1 6.1   RBC 4.45 4.60   HGB 13.8 14.3   HCT 42.0 42.7   MCV 94.4 92.8   MCH 31.0 31.1   MCHC 32.9 33.5   RDW 13.4 13.3    180   MPV 10.1 10.0   NRBC 0.00 0.00      LFT Recent Labs     02/25/23  1047 02/26/23  0414   BILITOT 0.6  --    ALKPHOS 89  --    AST 20  --    ALT 24  --    PROT 7.0  --    LABALBU 3.6 3.8   GLOB 3.4  --       Cardiac  Lab Results   Component Value Date/Time    NTPROBNP 445 02/25/2023 10:47 AM    TROPHS 10.1 02/25/2023 12:56 PM    TROPHS 8.9 02/25/2023 10:47 AM      Coags Lab Results   Component Value Date/Time    PROTIME 13.5 10/16/2019 03:38 PM    INR 1.0 10/16/2019 03:38 PM      A1c Lab Results   Component Value Date/Time    LABA1C 6.3 02/26/2023 04:14 AM     02/26/2023 04:14 AM      Lipids Lab Results   Component Value Date/Time    CHOL 243 02/26/2023 04:14 AM    LDLCALC 140.2 02/26/2023 04:14 AM    LABVLDL 34.8 02/26/2023 04:14 AM    HDL 68 02/26/2023 04:14 AM    CHOLHDLRATIO 3.6 02/26/2023 04:14 AM    TRIG 174 02/26/2023 04:14 AM      Thyroid  Lab Results   Component Value Date/Time    TSHELE 1.62 02/26/2023 04:14 AM        Most Recent UA Lab Results   Component Value Date/Time    COLORU YELLOW/STRAW 02/25/2023 11:12 AM    APPEARANCE CLOUDY 02/25/2023 11:12 AM    SPECGRAV 1.013 02/25/2023 11:12 AM    LABPH 8.5 02/25/2023 11:12 AM    PROTEINU Negative 02/25/2023 11:12 AM    GLUCOSEU Negative 02/25/2023 11:12 AM    KETUA Negative 02/25/2023 11:12 AM    BILIRUBINUR Negative 02/25/2023 11:12 AM    BILIRUBINUR Negative 05/04/2022 03:27 PM    BLOODU Negative 02/25/2023 11:12 AM    UROBILINOGEN 0.2 02/25/2023 11:12 AM    NITRU Negative 02/25/2023 11:12 AM    LEUKOCYTESUR Negative 02/25/2023 11:12 AM    WBCUA >100 10/12/2019 01:35 PM    RBCUA 3-5 10/12/2019 01:35 PM    BACTERIA 0 06/04/2021 02:53 PM        No results for input(s): CULTURE in the last 720 hours.     All Labs from Last 24 Hrs:  Recent Results (from the past 24 hour(s)) Troponin    Collection Time: 02/25/23 12:56 PM   Result Value Ref Range    Troponin, High Sensitivity 10.1 0 - 14 pg/mL   Magnesium    Collection Time: 02/25/23 12:56 PM   Result Value Ref Range    Magnesium 2.0 1.8 - 2.4 mg/dL   Vitamin B12    Collection Time: 02/25/23 12:56 PM   Result Value Ref Range    Vitamin B-12 266 193 - 986 pg/mL   Folate    Collection Time: 02/25/23 12:56 PM   Result Value Ref Range    Folate >20.0 (H) 3.1 - 17.5 ng/mL   Phosphorus    Collection Time: 02/26/23  4:14 AM   Result Value Ref Range    Phosphorus 3.0 2.3 - 3.7 MG/DL   Albumin    Collection Time: 02/26/23  4:14 AM   Result Value Ref Range    Albumin 3.8 3.2 - 4.6 g/dL   Basic Metabolic Panel w/ Reflex to MG    Collection Time: 02/26/23  4:14 AM   Result Value Ref Range    Sodium 138 133 - 143 mmol/L    Potassium 3.5 3.5 - 5.1 mmol/L    Chloride 104 101 - 110 mmol/L    CO2 27 21 - 32 mmol/L    Anion Gap 7 2 - 11 mmol/L    Glucose 165 (H) 65 - 100 mg/dL    BUN 16 8 - 23 MG/DL    Creatinine 0.54 (L) 0.6 - 1.0 MG/DL    Est, Glom Filt Rate >60 >60 ml/min/1.73m2    Calcium 9.4 8.3 - 10.4 MG/DL   CBC    Collection Time: 02/26/23  4:14 AM   Result Value Ref Range    WBC 6.1 4.3 - 11.1 K/uL    RBC 4.60 4.05 - 5.2 M/uL    Hemoglobin 14.3 11.7 - 15.4 g/dL    Hematocrit 42.7 35.8 - 46.3 %    MCV 92.8 82.0 - 102.0 FL    MCH 31.1 26.1 - 32.9 PG    MCHC 33.5 31.4 - 35.0 g/dL    RDW 13.3 11.9 - 14.6 %    Platelets 345 022 - 190 K/uL    MPV 10.0 9.4 - 12.3 FL    nRBC 0.00 0.0 - 0.2 K/uL   Procalcitonin    Collection Time: 02/26/23  4:14 AM   Result Value Ref Range    Procalcitonin <0.05 0.00 - 0.49 ng/mL   Lipid Panel    Collection Time: 02/26/23  4:14 AM   Result Value Ref Range    Cholesterol, Total 243 (H) <200 MG/DL    Triglycerides 174 (H) 35 - 150 MG/DL    HDL 68 (H) 40 - 60 MG/DL    LDL Calculated 140.2 (H) <100 MG/DL    VLDL Cholesterol Calculated 34.8 (H) 6.0 - 23.0 MG/DL    Chol/HDL Ratio 3.6     TSH with Reflex    Collection Time: 02/26/23  4:14 AM   Result Value Ref Range    TSH w Free Thyroid if Abnormal 1.62 0.358 - 3.740 UIU/ML   Hemoglobin A1C    Collection Time: 02/26/23  4:14 AM   Result Value Ref Range    Hemoglobin A1C 6.3 (H) 4.8 - 5.6 %    eAG 134 mg/dL   Magnesium    Collection Time: 02/26/23  4:14 AM   Result Value Ref Range    Magnesium 2.0 1.8 - 2.4 mg/dL       Allergies   Allergen Reactions    Aspirin Other (See Comments)    Cefpodoxime Other (See Comments)    Ciprofloxacin Other (See Comments)    Codeine Other (See Comments)    Diltiazem Other (See Comments)    Doxycycline Other (See Comments)    Hydrochlorothiazide Other (See Comments)    Nsaids Other (See Comments)    Oxycodone Other (See Comments)    Sulfa Antibiotics Other (See Comments)     Pt denies allergy    Tramadol Other (See Comments)     Immunization History   Administered Date(s) Administered    COVID-19, MODERNA BLUE border, Primary or Immunocompromised, (age 12y+), IM, 100 mcg/0.5mL 01/05/2021, 02/02/2021    Influenza Virus Vaccine 10/25/2011    Influenza, FLUAD, (age 72 y+), Adjuvanted, 0.5mL 10/13/2020    Influenza, FLUZONE (age 72 y+), High Dose, 0.7mL 10/15/2021    Influenza, High Dose (Fluzone 65 yrs and older) 12/16/2014, 09/29/2016, 09/29/2017, 10/16/2018    Influenza, Triv, 3 Years and older, IM (Afluria (5 yrs and older) 10/23/2008    Influenza, Triv, 3 Years and older, IM, PF (Afluria 5yrs and older) 10/11/2012    PPD Test 08/20/2016, 01/29/2018, 02/11/2018, 10/14/2019, 02/25/2023    Pneumococcal Conjugate 13-valent ([id number]) 05/19/2018    Pneumococcal Polysaccharide ([id number]) 05/06/2008, 10/25/2011    Zoster Live (Zostavax) 07/12/2013       Recent Vital Data:  Patient Vitals for the past 24 hrs:   Temp Pulse Resp BP SpO2   02/26/23 1147 -- 100 18 (!) 155/92 94 %   02/26/23 0750 97.5 °F (36.4 °C) 74 18 (!) 199/86 95 %   02/26/23 0333 97.7 °F (36.5 °C) 78 19 (!) 180/69 95 %   02/25/23 2325 97.3 °F (36.3 °C) 56 16 (!) 170/67 94 %   02/25/23 1943 97.9 °F (36.6 °C) 62 17 (!) 139/116 95 %   02/25/23 1623 98.4 °F (36.9 °C) 58 15 (!) 197/78 94 %   02/25/23 1538 -- 62 17 -- 94 %   02/25/23 1530 -- 60 19 (!) 174/74 92 %   02/25/23 1426 -- 63 16 (!) 199/81 --   02/25/23 1356 -- 72 10 (!) 227/94 --       Oxygen Therapy  SpO2: 94 %  O2 Device: None (Room air)    Estimated body mass index is 27.34 kg/m² as calculated from the following:    Height as of this encounter: 5' (1.524 m). Weight as of this encounter: 140 lb (63.5 kg). Intake/Output Summary (Last 24 hours) at 2/26/2023 1228  Last data filed at 2/26/2023 0586  Gross per 24 hour   Intake --   Output 400 ml   Net -400 ml       Physical Exam  Vitals and nursing note reviewed. Constitutional:       General: She is not in acute distress. Appearance: She is not diaphoretic. Eyes:      Extraocular Movements: Extraocular movements intact. Cardiovascular:      Rate and Rhythm: Normal rate. Pulmonary:      Effort: Pulmonary effort is normal. No respiratory distress. Breath sounds: No rales. Abdominal:      General: There is no distension. Palpations: Abdomen is soft. Tenderness: There is no abdominal tenderness. Musculoskeletal:         General: No deformity. Right lower leg: No edema. Left lower leg: No edema. Skin:     Coloration: Skin is not jaundiced or pale. Neurological:      General: No focal deficit present. Mental Status: She is alert and oriented to person, place, and time. Mental status is at baseline. She is confused. Comments: Looking for  who passed away 5 years prior   Psychiatric:         Mood and Affect: Mood normal.         Speech: Speech is tangential.         Behavior: Behavior is cooperative. Cognition and Memory: She exhibits impaired recent memory. Judgment: Judgment is not impulsive or inappropriate.       Comments: pleasant       Signed:  Marylen Chambers, MD

## 2023-02-26 NOTE — PLAN OF CARE
Problem: Discharge Planning  Goal: Discharge to home or other facility with appropriate resources  Outcome: Progressing  Flowsheets (Taken 2/25/2023 1640 by Nga Howard, RN)  Discharge to home or other facility with appropriate resources:   Identify barriers to discharge with patient and caregiver   Refer to discharge planning if patient needs post-hospital services based on physician order or complex needs related to functional status, cognitive ability or social support system   Identify discharge learning needs (meds, wound care, etc)     Problem: ABCDS Injury Assessment  Goal: Absence of physical injury  2/26/2023 0147 by Frank Breaux RN  Outcome: Progressing  2/25/2023 1716 by Nga Howard RN  Outcome: Progressing     Problem: Neurosensory - Adult  Goal: Achieves stable or improved neurological status  Outcome: Progressing     Problem: Respiratory - Adult  Goal: Achieves optimal ventilation and oxygenation  Outcome: Progressing     Problem: Cardiovascular - Adult  Goal: Maintains optimal cardiac output and hemodynamic stability  Outcome: Progressing     Problem: Musculoskeletal - Adult  Goal: Return mobility to safest level of function  Outcome: Progressing     Problem: Genitourinary - Adult  Goal: Absence of urinary retention  Outcome: Progressing     Problem: Infection - Adult  Goal: Absence of infection at discharge  Outcome: Progressing     Problem: Metabolic/Fluid and Electrolytes - Adult  Goal: Electrolytes maintained within normal limits  Outcome: Progressing

## 2023-02-27 NOTE — ADT AUTH CERT
NEW OBSERVATION TRAVON - 02/26/23    PLEASE FAX BACK IF AUTH IS REQUIRED- TILL ACCESS IS PROVIDED FOR VIEW ON [place] I #[id number] ([id number]) ([id number]:[id number] 80 y.o. F) (Adm: 02/25/23)  [id number]  PCP    [address], Conner Bonilla  Demographics  Comment        Address   [place] [address]    Home Phone   [phone]    Work Phone       Mobile Phone   [phone]             Social Security Number       Insurance Information   [employer]    Marital Status       Episcopal   Mandaen               Admission Information    Arrival Date/Time: 02/25/2023 0924 Admit Date/Time: 02/25/2023 0928 IP Adm.  Date/Time:    Admission Type: Emergency Point of Origin: Non-health Care Facility/self Admit Category:    Means of Arrival: Ambulance Primary Service: Medical Secondary Service: N/A   Transfer Source:  Service Area: [address] [place] [address] Unit: [facility] 3 ORTHO   Admit Provider: Tara Palacios MD Attending Provider: Santos Wright MD Referring Provider:      Patient Diagnoses    Reasons for Admission Coded Admission Diagnoses    *Pulmonary congestion [R09.89]    Acute pulmonary edema ([facility] [address] [J81.0]    Essential hypertension [I10]    Generalized abdominal pain [R10.84]    Constipation, unspecified constipation type [K59.00]     Insurance Payors as of 2/26/2023    [employer]    Plan: [employer] Member: [id number] Effective from: 1/1/2023   Subscriber: Vasquez Kaba ID: [id number] Guarantor: Angel Madsen     Documents Filed to Patient    Power of  Living Will Clinical Unknown Study Attachment Consent Form ABN Waiver After Visit Summary Lab Result Scan Code Status MyChart Status Advance Care Planning    Not on File  Filed on 12/14/15  Not on File  Not on File  Not on File  Not on File  Filed  Not on File  Prior  Pending Jump to the Activity      Auth/Cert Information    Create auth/cert for hospital account [de-identified] Patient Contacts    Name Raulito Carson Child [phone]   Omer Lira Daughter-in-Law   [phone]     Admission Information    Current Information    Attending at Discharge Admitting Provider Admission Type Admission Status   MD Geovanna Brown MD Emergency Confirmed Discharge          Admission Date/Time Discharge Date Hospital Service Auth/Cert Status     09:28 AM 23 [id number] [facility] [address] Unit Room/Bed    [place] [address] ORTHOPEDICS 324/01            Discharge Disposition Discharge Destination   Home or [facility] Account    Name Acct ID Class Status Primary Coverage   Jacqueline Luevano [id number] Observation Discharged/Not Billed [facility]            Guarantor Account (for Hospital Account [de-identified])    Name Relation to Pt Service Area Active? Acct Type   Jacqueline Luevano Self [address] Yes Personal/Family   Address Phone     [address] [facility] [phone](E)              Coverage Information (for Hospital Account [de-identified])    F/O Payor/Plan Precert #   [facility]    Subscriber Subscriber #   Jacqueline Luevano [id number]   Address Phone   [address] [place] [address] [phone]            Last H&P Note      H&P by Geovanna Grey MD at 2023  1:22 PM    Author: Geovanna Grey MD Specialty: Internal Medicine, Hospitalist Author Type: Physician   Filed: 2023  2:12 PM Date of Service: 2023  1:22 PM Status: Signed   : Geovanna Grey MD (Physician)   Expand All Collapse All        Hospitalist History and Physical   Admit Date:     2023  9:28 AM   Name:              Nina Peterson   Age:                 80 y.o.   Sex:                 female  :               1927   MRN:               [id number]   Room:                   Presenting Complaint: Abdominal Pain     Reason(s) for Admission: Pulmonary congestion [R09.89]      History of Present Illness:   Rosita Peabody is a 80 y.o. female with medical history of HTN, HFpEF, PAF who presented with abdominal pain. She had pain for several days. In the ED CT CAP demonstrated pulmonary congestion. She has a history of DD. She was admitted to observation. Assessment & Plan:   Pulmonary congestion  History of diastolic dysfunction  -echocardiogram  -bumetanide     Primary hypertension  -lisinopril, metoprolol     Slow transit constipation  -soap suds enema  -psyllium, saccharomyces     Do not resuscitate status  Noted        PT/OT evals and PPD needed/ordered? Yes  Diet: No diet orders on file  VTE prophylaxis:  enoxaparin  Code status: No Order     Hospital Problems:  Principal Problem:    Pulmonary congestion  Active Problems:    Primary hypertension    PAF (paroxysmal atrial fibrillation) ([facility] [address]    DNR (do not resuscitate)  Resolved Problems:    * No resolved hospital problems.  *        Past History:      Past Medical History        Past Medical History:   Diagnosis Date    Abnormal EKG 8/30/2021    Acute pulmonary edema ([facility] [address] 2/9/2018    Acute respiratory failure with hypoxia ([facility] [address] 2/9/2018    Atrial fibrillation (HCC)      CAD (coronary artery disease)       afib    Chest pain 9/13/2016    Chest pain 1/56/2101    Diastolic CHF, acute on chronic (HCC) 2/10/2018    RUBY (generalized anxiety disorder)      Gastrointestinal disorder irritable bowel    GERD (gastroesophageal reflux disease)      Hypertension      Hyponatremia 8/2/5799    Metabolic encephalopathy 87/61/2242    UTI (urinary tract infection)      UTI (urinary tract infection) 10/12/2019            Past Surgical History         Past Surgical History:   Procedure Laterality Date    CHOLECYSTECTOMY         Colostomy reversal    ORTHOPEDIC SURGERY         left hip replacement, rt pelvic fx    OTHER SURGICAL HISTORY         Extensive abdominal surgical history            Social History Tobacco Use    Smoking status: Never    Smokeless tobacco: Never   Substance Use Topics    Alcohol use: No      Social History          Substance and Sexual Activity   Drug Use No         Family History   History reviewed. No pertinent family history.               Immunization History   Administered Date(s) Administered    COVID-19, MODERNA BLUE border, Primary or Immunocompromised, (age 12y+), IM, 100 mcg/0.5mL 01/05/2021, 02/02/2021    PPD Test 08/20/2016, 02/11/2018, 10/14/2019            Allergies   Allergen Reactions    Aspirin Other (See Comments)    Cefpodoxime Other (See Comments)    Ciprofloxacin Other (See Comments)    Codeine Other (See Comments)    Diltiazem Other (See Comments)    Doxycycline Other (See Comments)    Hydrochlorothiazide Other (See Comments)    Nsaids Other (See Comments)    Oxycodone Other (See Comments)    Sulfa Antibiotics Other (See Comments)       Pt denies allergy    Tramadol Other (See Comments)      Prior to Admit Medications:       Current Outpatient Medications   Medication Instructions    acetaminophen (TYLENOL) 325 MG tablet Oral    cetirizine (ZYRTEC) 10 mg, Oral, DAILY    lisinopril (PRINIVIL;ZESTRIL) 20 mg, Oral, DAILY    Menthol-Methyl Salicylate (THERA-GESIC) 0.5-15 % CREA Topical, 3 TIMES DAILY    metoprolol succinate (TOPROL XL) 50 mg, Oral, 2 TIMES DAILY    montelukast (SINGULAIR) 10 mg, Oral, DAILY    nitrofurantoin (macrocrystal-monohydrate) (MACROBID) 100 mg, Oral, DAILY    omeprazole (PRILOSEC) 20 mg, Oral, DAILY    polyethylene glycol (GLYCOLAX) 17 g, Oral, DAILY    Premarin 0.5 g, Vaginal, DAILY    sodium chloride (OCEAN) 0.65 % nasal spray 1 spray, PRN            Objective:   Patient Vitals for the past 24 hrs:    Temp Pulse Resp BP SpO2   02/25/23 1110 -- 73 24 -- 95 %   02/25/23 1100 -- 71 21 (!) 210/163 95 %   02/25/23 1040 -- 64 12 (!) 189/88 --   02/25/23 0940 -- -- -- (!) 213/97 97 %   02/25/23 0935 97.5 °F (36.4 °C) 65 18 (!) 213/97 98 %         Oxygen Therapy  SpO2: 95 %  O2 Device: None (Room air)     Estimated body mass index is 27.34 kg/m² as calculated from the following:    Height as of this encounter: 5' (1.524 m). Weight as of this encounter: 140 lb (63.5 kg). No intake or output data in the 24 hours ending 02/25/23 1402       Blood pressure (!) 210/163, pulse 73, temperature 97.5 °F (36.4 °C), temperature source Oral, resp. rate 24, height 5' (1.524 m), weight 140 lb (63.5 kg), SpO2 95 %. Physical Exam  Vitals and nursing note reviewed. Constitutional:       General: She is not in acute distress. Appearance: She is not diaphoretic. Eyes:      Extraocular Movements: Extraocular movements intact. Cardiovascular:      Rate and Rhythm: Normal rate and regular rhythm. Pulmonary:      Effort: Pulmonary effort is normal. No respiratory distress. Breath sounds: No wheezing, rhonchi or rales. Abdominal:      General: Abdomen is flat. There is no distension. Palpations: Abdomen is soft. Tenderness: There is abdominal tenderness in the left upper quadrant and left lower quadrant. Musculoskeletal:         General: No deformity. Right lower leg: No edema. Left lower leg: No edema. Skin:     Coloration: Skin is not jaundiced or pale. Neurological:      General: No focal deficit present. Mental Status: She is alert and oriented to person, place, and time.    Psychiatric:         Mood and Affect: Mood normal.         Behavior: Behavior normal.      Comments: affable            I have personally reviewed labs and tests:  Recent Labs:  Recent Results         Recent Results (from the past 24 hour(s))   EKG 12 Lead     Collection Time: 02/25/23 10:38 AM   Result Value Ref Range     Ventricular Rate 60 BPM     Atrial Rate 60 BPM     P-R Interval 174 ms     QRS Duration 102 ms     Q-T Interval 429 ms     QTc Calculation (Bazett) 429 ms     P Axis 70 degrees     R Axis 11 degrees     T Axis 63 degrees     Diagnosis Sinus rhythm     CBC     Collection Time: 02/25/23 10:47 AM   Result Value Ref Range     WBC 6.1 4.3 - 11.1 K/uL     RBC 4.45 4.05 - 5.2 M/uL     Hemoglobin 13.8 11.7 - 15.4 g/dL     Hematocrit 42.0 35.8 - 46.3 %     MCV 94.4 82.0 - 102.0 FL     MCH 31.0 26.1 - 32.9 PG     MCHC 32.9 31.4 - 35.0 g/dL     RDW 13.4 11.9 - 14.6 %     Platelets 429 602 - 457 K/uL     MPV 10.1 9.4 - 12.3 FL     nRBC 0.00 0.0 - 0.2 K/uL   Comprehensive Metabolic Panel     Collection Time: 02/25/23 10:47 AM   Result Value Ref Range     Sodium 142 133 - 143 mmol/L     Potassium 3.7 3.5 - 5.1 mmol/L     Chloride 107 101 - 110 mmol/L     CO2 30 21 - 32 mmol/L     Anion Gap 5 2 - 11 mmol/L     Glucose 134 (H) 65 - 100 mg/dL     BUN 19 8 - 23 MG/DL     Creatinine 0.49 (L) 0.6 - 1.0 MG/DL     Est, Glom Filt Rate >60 >60 ml/min/1.73m2     Calcium 9.0 8.3 - 10.4 MG/DL     Total Bilirubin 0.6 0.2 - 1.1 MG/DL     ALT 24 12 - 65 U/L     AST 20 15 - 37 U/L     Alk Phosphatase 89 50 - 136 U/L     Total Protein 7.0 6.3 - 8.2 g/dL     Albumin 3.6 3.2 - 4.6 g/dL     Globulin 3.4 2.8 - 4.5 g/dL     Albumin/Globulin Ratio 1.1 0.4 - 1.6     Lipase     Collection Time: 02/25/23 10:47 AM   Result Value Ref Range     Lipase 117 73 - 393 U/L   Troponin     Collection Time: 02/25/23 10:47 AM   Result Value Ref Range     Troponin, High Sensitivity 8.9 0 - 14 pg/mL   Brain Natriuretic Peptide     Collection Time: 02/25/23 10:47 AM   Result Value Ref Range     NT Pro- <450 PG/ML   Procalcitonin     Collection Time: 02/25/23 10:47 AM   Result Value Ref Range     Procalcitonin <0.05 0.00 - 0.49 ng/mL   Urinalysis     Collection Time: 02/25/23 11:12 AM   Result Value Ref Range     Color, UA YELLOW/STRAW       Appearance CLOUDY       Specific Baldwin, UA 1.013 1.001 - 1.023       pH, Urine 8.5 5.0 - 9.0       Protein, UA Negative NEG mg/dL     Glucose, UA Negative mg/dL     Ketones, Urine Negative NEG mg/dL     Bilirubin Urine Negative NEG       Blood, Urine Negative NEG       Urobilinogen, Urine 0.2 0.2 - 1.0 EU/dL     Nitrite, Urine Negative NEG       Leukocyte Esterase, Urine Negative NEG     Troponin     Collection Time: 02/25/23 12:56 PM   Result Value Ref Range     Troponin, High Sensitivity 10.1 0 - 14 pg/mL            I have personally reviewed imaging studies:  XR CHEST PORTABLE     Result Date: 2/25/2023  EXAM: XR CHEST PORTABLE INDICATION: sob COMPARISON: None FINDINGS: Cardiomediastinal silhouette is within normal limits for technique. Atherosclerotic calcifications in the aorta. No pleural effusion or pneumothorax. Diffusely increased interstitial opacities. No focal alveolar consolidation. No acute osseous abnormality. Multiple chronic right posterior rib fractures. Diffuse interstitial opacities could represent chronic interstitial lung changes, pulmonary edema, or atypical infection. CT CHEST ABDOMEN PELVIS W CONTRAST Additional Contrast? None     Result Date: 2/25/2023  EXAMINATION: CT CHEST, ABDOMEN, AND PELVIS 2/25/2023 11:51 AM ACCESSION NUMBER: [id number] INDICATION: Shortness of breath abdominal pain. COMPARISON: Chest radiograph, 2/25/2023. CT abdomen and pelvis, 5/4/2022 TECHNIQUE: Multiple contiguous axial CT images of the chest, abdomen, and pelvis were obtained from the thoracic inlet to the pubic symphysis after the intravenous administration of 100 mL Isovue-370 contrast material. Radiation dose reduction techniques were used for this study:  Our CT scanners use one or all of the following: Automated exposure control, adjustment of the mA and/or kVp according to patient's size, iterative reconstruction. FINDINGS: CHEST: LUNGS/PLEURA: Central airways are patent. Minimal bibasilar dependent subsegmental atelectasis. There is mild bilateral symmetric and basilar predominant interlobular septal thickening. No alveolar consolidation. No pleural effusion or pneumothorax.  4 mm peripheral solid nodule in the right middle lobe (axial image 62), not visualized on the prior but potentially related to differences in field of view. 5 mm solid nodule in the left lower lobe (axial image 36), region not covered on comparison. MEDIASTINUM: Visualized thyroid is normal. Thoracic esophagus is unremarkable. The heart is enlarged without pericardial effusion. Great vessels are normal in caliber with atherosclerotic calcifications including involvement of the coronary arteries. Evaluation for pulmonary emboli is diagnostic through the subsegmental level. No evidence of acute or chronic pulmonary embolism. BONES AND SOFT TISSUES: No axillary adenopathy. Regional soft tissues are unremarkable. Osteopenia. Multiple chronic healed rib fractures. There are multiple chronic appearing compression fracture within the mid thoracic spine involving T3, T8, T9, and T10. ABDOMEN/PELVIS: LIVER: Normal BILIARY: Status post cholecystectomy with unchanged pneumobilia likely related to prior biliary intervention. SPLEEN: No splenomegaly or concerning lesion. PANCREAS: No pancreatic duct dilation or mass. ADRENALS: No adrenal nodules or hypertrophy. URINARY: Kidneys are symmetric in size and enhancement. There are bilateral subcentimeter hypodensities which are too small to characterize but similar to prior and statistically cysts. No hydronephrosis or nephrolithiasis. Ureters are within normal limits. Urinary bladder is poorly visualized due to streak artifact but grossly normal on the metal artifact reduction reformat. BOWEL: The stomach, small bowel, and large bowel are normal in caliber and wall thickness. No inflammatory changes. Postsurgical changes involving the right colon and distal small bowel with patent appearing anastomosis in the right lower quadrant. Additional large bowel anastomosis in the rectosigmoid region. There is a moderate colorectal stool burden with scattered diverticula. Appendix is surgically absent.  VASCULAR: The abdominal aorta and iliac arterial system are nonaneurysmal with severe atherosclerotic calcifications. NODES: No lymphadenopathy. FLUID: No free intraperitoneal fluid. REPRODUCTIVE: Status post hysterectomy. BONES/SOFT TISSUES: Regional soft tissues are unremarkable. No acute or aggressive osseous normality. Chronic right pubic rami fractures. Bilateral total hip arthroplasties with resultant streak artifact. Chronic bilateral sacral sufficiency fractures. Multiple chronic compression fractures in the lower thoracic and lumbar spine are unchanged. CHEST: 1. No evidence of pulmonary embolism. 2. Mild interstitial pulmonary edema with cardiomegaly. 3. Pulmonary nodules measuring up to 5 mm, not definitively seen on prior. If the patient is considered high risk per Fleischner Society guidelines, follow-up CT in one year is recommended. If not high risk, no further follow-up required. 4. Atherosclerosis including coronary artery involvement. 5. Several chronic appearing midthoracic vertebral body compression fractures without comparison imaging to document stability. Correlate for point tenderness to ensure no acute component. ABDOMEN AND PELVIS: 1. No acute process. 2. Moderate colonic stool burden with scattered diverticula. No evidence of bowel obstruction, colitis, or diverticulitis. Status post appendectomy. 3.         Echocardiogram:  No results found for this or any previous visit.            Encounter Medications        Orders Placed This Encounter   Medications    iopamidol (ISOVUE-370) 76 % injection 100 mL    0.9 % sodium chloride bolus    furosemide (LASIX) injection 40 mg               Signed:  Quinton Garg MD              Utilization Reviews         Abdominal Pain, Undiagnosed - Care Day 1 (2/25/2023) by Sarah Palomares RN       Review Entered Review Status   2/26/2023 1116 Completed      Criteria Review      Care Day: 1 Care Date: 2/25/2023 Level of Care:    Guideline Day 1    Level Of Care    (X) Floor    Clinical Status    ( ) * Clinical Indications met    (X) Acute pain unrelieved by symptomatic treatment    Activity    (X) Activity as tolerated    2/26/2023 11:16 AM EST by Kaiser Block      Up as tolerated  PRN    Routes    ( ) NPO    2/26/2023 11:16 AM EST by Kaiser Block      regular diet    ( ) IV medications    2/26/2023 11:16 AM EST by Kaiser Block      furosemide (LASIX) injection 40 mg  Dose: 40 mg  Freq: NOW Route: IV    Interventions    (X) CBC, chemistries, urinalysis, pregnancy test    2/26/2023 11:16 AM EST by Kaiser Block      wbc 6.1  hgb 13.8  plt 172    na 142  k 3.7  cl 107  co2 20  bun 19  creat 0.49    (X) Abdominal x-rays    2/26/2023 11:16 AM EST by Kaiser Block      ABDOMEN AND PELVIS:   1. No acute process. 2. Moderate colonic stool burden with scattered diverticula. No evidence of   bowel obstruction, colitis, or diverticulitis. Status post appendectomy. * Milestone   Additional Notes   DATE: 2/25/2023         CHIEF COMPLAINT/ED PRESENTATION:   · Abdominal Pain       80year-old female arrives from the [place] via [facility] with complaint of abdominal pain earlier this morning. Symptoms have improved. Patient is anxious and poor historian. She is alert and oriented x2. Her son arrives and is unable to provide any insight as into the reason for today's visit but reports to me that she likes to leave the [place] on the weekends frequently and is often nervous. Patient unable to describe the pain she had but says it lasted several minutes. Pain was rather diffuse but worse in the lower abdomen. No radiation of the pain. She has intermittent diarrhea and constipation. No blood in the stool reported. Chronic dysuria and frequency reported. VITALS:   Blood pressure (!) 210/163, pulse 73, temperature 97.5 °F (36.4 °C), temperature source Oral, resp. rate 24, height 5' (1.524 m), weight 140 lb (63.5 kg), SpO2 95 %.       ABNL/PERTINENT LABS/RADIOLOGY/DIAGNOSTIC STUDIES:   XR CHEST PORTABLE   Diffuse interstitial opacities could represent chronic interstitial lung changes, pulmonary edema, or atypical infection. CT CHEST ABDOMEN PELVIS W CONTRAST    CHEST: 1. No evidence of pulmonary embolism. 2. Mild interstitial pulmonary edema with cardiomegaly. 3. Pulmonary nodules measuring up to 5 mm, not definitively seen on prior. If the patient is considered high risk per Fleischner Society guidelines, follow-up CT in one year is recommended. If not high risk, no further follow-up required. 4. Atherosclerosis including coronary artery involvement. 5. Several chronic appearing midthoracic vertebral body compression fractures without comparison imaging to document stability. Correlate for point tenderness to ensure no acute component. ABDOMEN AND PELVIS: 1. No acute process. 2. Moderate colonic stool burden with scattered diverticula. No evidence of bowel obstruction, colitis, or diverticulitis. Status post appendectomy. 3.          ED TREATMENT:   LASIX 40 MG IV X1      Medical Decision Making   Patient's abdominal pain is improved but she remains moderately tender on exam.  Plan on labs, urine and CT scan of the abdomen and pelvis with IV contrast if creatinine and GFR will allow. Blood pressure elevated so we will obtain EKG and I will add on troponins and get a chest x-ray. I believe her shortness of breath is related to her pain and anxiety. 12:56 PM   Patient's work-up revealed abnormal chest x-ray which is followed by a chest CT and CT of her abdomen pelvis due to her abdominal pain and tenderness. Nothing was found in intra-abdominal he except moderate stool burden. Some new nodules are seen in the lungs but she is low risk as she was never a smoker and therefore CT scan imaging follow-up is not recommended. Patient and her son were informed of these findings. CT chest did show pulmonary edema.   I am providing some Lasix and will put a pure wick in for her increased urination that will surely come.  Hospitalist has agreed to admit her for serial abdominal exams as well as repeat echo and further work-up of new onset mild congestive heart failure. PERTINENT MEDICAL HISTORY:   Alyson Leiva is a 80 y.o. female with medical history of HTN, HFpEF, PAF who presented with abdominal pain. She had pain for several days. In the ED CT CAP demonstrated pulmonary congestion. She has a history of DD      PHYSICAL EXAM:   Constitutional:        General: She is not in acute distress. Appearance: She is not diaphoretic. Eyes:       Extraocular Movements: Extraocular movements intact. Cardiovascular:       Rate and Rhythm: Normal rate and regular rhythm. Pulmonary:       Effort: Pulmonary effort is normal. No respiratory distress. Breath sounds: No wheezing, rhonchi or rales. Abdominal:       General: Abdomen is flat. There is no distension. Palpations: Abdomen is soft. Tenderness: There is abdominal tenderness in the left upper quadrant and left lower quadrant. Musculoskeletal:          General: No deformity. Right lower leg: No edema. Left lower leg: No edema. Skin:      Coloration: Skin is not jaundiced or pale. Neurological:       General: No focal deficit present. Mental Status: She is alert and oriented to person, place, and time. Psychiatric:          Mood and Affect: Mood normal.          Behavior: Behavior normal.       Comments: andrei TOMPKINS CONSULTS/ASSESSMENTS & PLANS:   Pulmonary congestion   History of diastolic dysfunction   -echocardiogram   -bumetanide       Primary hypertension   -lisinopril, metoprolol       Slow transit constipation   -soap suds enema   -psyllium, saccharomyces       Do not resuscitate status   Noted           PT/OT evals and PPD needed/ordered?   Yes   Diet: No diet orders on file   VTE prophylaxis:  enoxaparin   Code status: No Order          MEDICATIONS:   estrogens conjugated, 0.5 g, Vaginal, Daily   lisinopril, 20 mg, Oral, Daily   metoprolol succinate, 50 mg, Oral, BID   polyethylene glycol, 17 g, Oral, Daily   enoxaparin, 40 mg, SubCUTAneous, Q24H   sennosides-docusate sodium, 1 tablet, Oral, BID   tuberculin, 5 Units, IntraDERmal, Once   psyllium, 1 packet, Oral, Daily   lactobacillus acidophilus, 4 tablet, Oral, BID   Stress Formula/Zinc (B-Compl), 1 tablet, Oral, Daily         ORDERS:   ROUTINE VITALS   SS ENEMA   ECHO   DNR   REGULAR DIET   Vascular duplex lower extremity venous bilateral    DAILY LABS   PT/OT/SLP EVALS         PT/OT/SLP/CM ASSESSMENTS or NOTES:   PENDING         Abdominal Pain, Undiagnosed - Clinical Indications for Admission to Inpatient Care by Stephanie Francisco RN       Review Entered Review Status   2/26/2023 1114 Completed      Criteria Review      Clinical Indications for Admission to Inpatient Care    Most Recent : Denise Logan Most Recent Date: 2/26/2023 11:14 AM EST     (X) Other Indication: abd pain      Entered 2/26/2023 11:14 AM EST by Denise Logan     moderate stool burden

## 2023-04-05 ENCOUNTER — OFFICE VISIT (OUTPATIENT)
Dept: CARDIOLOGY CLINIC | Age: 88
End: 2023-04-05
Payer: MEDICARE

## 2023-04-05 VITALS
OXYGEN SATURATION: 97 % | SYSTOLIC BLOOD PRESSURE: 162 MMHG | HEIGHT: 60 IN | WEIGHT: 126 LBS | DIASTOLIC BLOOD PRESSURE: 90 MMHG | BODY MASS INDEX: 24.74 KG/M2 | HEART RATE: 74 BPM

## 2023-04-05 DIAGNOSIS — I48.0 PAROXYSMAL ATRIAL FIBRILLATION (HCC): Primary | Chronic | ICD-10-CM

## 2023-04-05 DIAGNOSIS — I35.0 NONRHEUMATIC AORTIC VALVE STENOSIS: ICD-10-CM

## 2023-04-05 DIAGNOSIS — I10 PRIMARY HYPERTENSION: Chronic | ICD-10-CM

## 2023-04-05 DIAGNOSIS — I50.32 CHRONIC HEART FAILURE WITH PRESERVED EJECTION FRACTION (HCC): Chronic | ICD-10-CM

## 2023-04-05 PROCEDURE — 1123F ACP DISCUSS/DSCN MKR DOCD: CPT | Performed by: INTERNAL MEDICINE

## 2023-04-05 PROCEDURE — 99214 OFFICE O/P EST MOD 30 MIN: CPT | Performed by: INTERNAL MEDICINE

## 2023-04-05 RX ORDER — BUMETANIDE 0.5 MG/1
0.5 TABLET ORAL DAILY
COMMUNITY

## 2023-04-05 RX ORDER — LISINOPRIL 20 MG/1
20 TABLET ORAL 2 TIMES DAILY
Qty: 60 TABLET | Refills: 5 | Status: SHIPPED | OUTPATIENT
Start: 2023-04-05

## 2023-04-05 ASSESSMENT — ENCOUNTER SYMPTOMS
ORTHOPNEA: 0
SPUTUM PRODUCTION: 0
VOMITING: 0
HEMATOCHEZIA: 0
ABDOMINAL PAIN: 0
HOARSE VOICE: 0
BOWEL INCONTINENCE: 0
COLOR CHANGE: 0
WHEEZING: 0
SORE THROAT: 0
SHORTNESS OF BREATH: 1
RHINORRHEA: 0
SWOLLEN GLANDS: 0
BLURRED VISION: 0
HEMATEMESIS: 0
DIARRHEA: 0
HEMOPTYSIS: 0

## 2023-04-05 NOTE — PROGRESS NOTES
mouth daily, Disp: , Rfl:     conjugated estrogens (PREMARIN) 0.625 MG/GM vaginal cream, Place 0.5 g vaginally daily, Disp: , Rfl:     Menthol-Methyl Salicylate (THERA-GESIC) 0.5-15 % CREA, Apply topically 3 times daily, Disp: , Rfl:     metoprolol succinate (TOPROL XL) 50 MG extended release tablet, Take 1 tablet by mouth 2 times daily, Disp: , Rfl:     montelukast (SINGULAIR) 10 MG tablet, Take 1 tablet by mouth daily, Disp: , Rfl:     nitrofurantoin, macrocrystal-monohydrate, (MACROBID) 100 MG capsule, Take 1 capsule by mouth daily, Disp: , Rfl:     omeprazole (PRILOSEC) 20 MG delayed release capsule, Take 1 capsule by mouth daily, Disp: , Rfl:     polyethylene glycol (GLYCOLAX) 17 GM/SCOOP powder, Take 17 g by mouth daily, Disp: , Rfl:     sodium chloride (OCEAN) 0.65 % nasal spray, 1 spray as needed, Disp: , Rfl:   Allergies   Allergen Reactions    Aspirin Other (See Comments)    Cefpodoxime Other (See Comments)    Ciprofloxacin Other (See Comments)    Codeine Other (See Comments)    Diltiazem Other (See Comments)    Doxycycline Other (See Comments)    Hydrochlorothiazide Other (See Comments)    Nsaids Other (See Comments)    Oxycodone Other (See Comments)    Sulfa Antibiotics Other (See Comments)     Pt denies allergy    Tramadol Other (See Comments)     Past Medical History:   Diagnosis Date    Abnormal EKG 8/30/2021    Acute pulmonary edema ([facility] [address] 2/9/2018    Acute respiratory failure with hypoxia (HCC) 2/9/2018    Atrial fibrillation (HCC)     CAD (coronary artery disease)     afib    Chest pain 9/13/2016    Chest pain 1/79/0217    Diastolic CHF, acute on chronic ([facility] [address] 2/10/2018    RUBY (generalized anxiety disorder)     Gastrointestinal disorder irritable bowel    GERD (gastroesophageal reflux disease)     Hypertension     Hyponatremia 1/2/9745    Metabolic encephalopathy 70/96/5844    Pulmonary congestion 2/25/2023    UTI (urinary tract infection)     UTI (urinary tract infection) 10/12/2019     Past

## 2023-04-26 DIAGNOSIS — I50.32 CHRONIC HEART FAILURE WITH PRESERVED EJECTION FRACTION (HCC): Chronic | ICD-10-CM

## 2023-04-26 DIAGNOSIS — I10 PRIMARY HYPERTENSION: Chronic | ICD-10-CM

## 2023-04-26 RX ORDER — LISINOPRIL 20 MG/1
TABLET ORAL
Qty: 90 TABLET | Refills: 3 | Status: SHIPPED | OUTPATIENT
Start: 2023-04-26

## 2023-04-26 RX ORDER — BUMETANIDE 0.5 MG/1
TABLET ORAL
Qty: 90 TABLET | Refills: 3 | Status: SHIPPED | OUTPATIENT
Start: 2023-04-26

## 2023-06-17 ENCOUNTER — APPOINTMENT (OUTPATIENT)
Dept: CT IMAGING | Age: 88
End: 2023-06-17
Payer: MEDICARE

## 2023-06-17 ENCOUNTER — APPOINTMENT (OUTPATIENT)
Dept: GENERAL RADIOLOGY | Age: 88
End: 2023-06-17
Payer: MEDICARE

## 2023-06-17 ENCOUNTER — HOSPITAL ENCOUNTER (EMERGENCY)
Age: 88
Discharge: HOME | End: 2023-06-17
Attending: EMERGENCY MEDICINE
Payer: MEDICARE

## 2023-06-17 VITALS
HEART RATE: 63 BPM | WEIGHT: 117 LBS | DIASTOLIC BLOOD PRESSURE: 73 MMHG | OXYGEN SATURATION: 95 % | SYSTOLIC BLOOD PRESSURE: 175 MMHG | BODY MASS INDEX: 22.97 KG/M2 | HEIGHT: 60 IN | TEMPERATURE: 98 F | RESPIRATION RATE: 16 BRPM

## 2023-06-17 DIAGNOSIS — S62.346A NONDISPLACED FRACTURE OF BASE OF FIFTH METACARPAL BONE, RIGHT HAND, INITIAL ENCOUNTER FOR CLOSED FRACTURE: ICD-10-CM
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
## 2023-06-17 DIAGNOSIS — S01.511A LIP LACERATION, INITIAL ENCOUNTER: ICD-10-CM

## 2023-06-17 DIAGNOSIS — S61.411A LACERATION OF RIGHT HAND WITHOUT FOREIGN BODY, INITIAL ENCOUNTER: ICD-10-CM

## 2023-06-17 PROCEDURE — 12001 RPR S/N/AX/GEN/TRNK 2.5CM/<: CPT

## 2023-06-17 PROCEDURE — 29125 APPL SHORT ARM SPLINT STATIC: CPT

## 2023-06-17 PROCEDURE — 73130 X-RAY EXAM OF HAND: CPT

## 2023-06-17 PROCEDURE — 70486 CT MAXILLOFACIAL W/O DYE: CPT

## 2023-06-17 PROCEDURE — 90471 IMMUNIZATION ADMIN: CPT

## 2023-06-17 PROCEDURE — 99284 EMERGENCY DEPT VISIT MOD MDM: CPT

## 2023-06-17 PROCEDURE — 70450 CT HEAD/BRAIN W/O DYE: CPT

## 2023-06-17 PROCEDURE — 90714 TD VACC NO PRESV 7 YRS+ IM: CPT

## 2023-06-17 PROCEDURE — 6360000002 HC RX W HCPCS

## 2023-06-17 PROCEDURE — 72125 CT NECK SPINE W/O DYE: CPT

## 2023-06-17 PROCEDURE — 73590 X-RAY EXAM OF LOWER LEG: CPT

## 2023-06-17 PROCEDURE — 12011 RPR F/E/E/N/L/M 2.5 CM/<: CPT

## 2023-06-17 RX ORDER — TETANUS AND DIPHTHERIA TOXOIDS ADSORBED 2; 2 [LF]/.5ML; [LF]/.5ML
0.5 INJECTION INTRAMUSCULAR
Status: COMPLETED | OUTPATIENT
Start: 2023-06-17 | End: 2023-06-17

## 2023-06-17 RX ADMIN — TETANUS AND DIPHTHERIA TOXOIDS ADSORBED 0.5 ML: 2; 2 INJECTION INTRAMUSCULAR at 11:17

## 2023-06-17 ASSESSMENT — ENCOUNTER SYMPTOMS
COLOR CHANGE: 0
CHEST TIGHTNESS: 0
DIARRHEA: 0
SHORTNESS OF BREATH: 0
NAUSEA: 0
VOMITING: 0
ABDOMINAL PAIN: 0

## 2023-06-17 ASSESSMENT — LIFESTYLE VARIABLES
HOW MANY STANDARD DRINKS CONTAINING ALCOHOL DO YOU HAVE ON A TYPICAL DAY: PATIENT DOES NOT DRINK
HOW OFTEN DO YOU HAVE A DRINK CONTAINING ALCOHOL: NEVER

## 2023-06-17 ASSESSMENT — PAIN - FUNCTIONAL ASSESSMENT: PAIN_FUNCTIONAL_ASSESSMENT: 0-10

## 2023-06-17 ASSESSMENT — PAIN DESCRIPTION - ORIENTATION: ORIENTATION: LEFT

## 2023-06-17 ASSESSMENT — PAIN DESCRIPTION - PAIN TYPE: TYPE: ACUTE PAIN

## 2023-06-17 ASSESSMENT — PAIN DESCRIPTION - LOCATION: LOCATION: ANKLE;HAND

## 2023-06-17 ASSESSMENT — PAIN DESCRIPTION - DESCRIPTORS: DESCRIPTORS: ACHING

## 2023-06-17 NOTE — ED NOTES
I have reviewed discharge instructions with the patient. The patient verbalized understanding. Patient left ED via Discharge Method: stretcher to Home with self and [facility].    Opportunity for questions and clarification provided. Patient given 0 scripts. To continue your aftercare when you leave the hospital, you may receive an automated call from our care team to check in on how you are doing. This is a free service and part of our promise to provide the best care and service to meet your aftercare needs.  If you have questions, or wish to unsubscribe from this service please call [phone]. Thank you for Choosing our [facility] Emergency Department.        Stef Ybarra RN  06/17/23 7412

## 2023-06-17 NOTE — ED TRIAGE NOTES
From independent living facility [facility]) rm 103. Pt had an unwitnessed fall this morning, no loc, admits to hitting her face, swelling to right side of face, lac to left hand  c/o left ankle pain. (-) blood thinners.     A&Ox4

## 2023-06-17 NOTE — ED PROVIDER NOTES
Conjunctiva/sclera: Conjunctivae normal.      Pupils: Pupils are equal, round, and reactive to light. Cardiovascular:      Rate and Rhythm: Normal rate and regular rhythm. Pulses: Normal pulses. Heart sounds: Normal heart sounds. Pulmonary:      Effort: Pulmonary effort is normal.      Breath sounds: Normal breath sounds. Abdominal:      General: Abdomen is flat. Palpations: Abdomen is soft. Tenderness: There is no abdominal tenderness. Musculoskeletal:         General: Tenderness present. Normal range of motion. Cervical back: Normal range of motion and neck supple. Comments: Tenderness over the lateral left hand tenderness over the anterior lower left leg. Skin:     General: Skin is warm and dry. Comments: Superficial left dorsal hand laceration, approximately 2 cm in length. Neurological:      General: No focal deficit present. Mental Status: She is alert and oriented to person, place, and time.    Psychiatric:         Mood and Affect: Mood normal.         Behavior: Behavior normal.        Procedures     Lac Repair    Date/Time: 6/17/2023 4:58 PM  Performed by: MARVA Isidro  Authorized by: Jose Horton MD     Consent:     Consent obtained:  Verbal    Consent given by:  Patient    Risks discussed:  Pain    Alternatives discussed:  No treatment  Universal protocol:     Patient identity confirmed:  Verbally with patient  Anesthesia:     Anesthesia method:  Local infiltration    Local anesthetic:  Lidocaine 1% w/o epi  Laceration details:     Location:  Lip    Lip location:  Upper interior lip    Length (cm):  1    Depth (mm):  3  Pre-procedure details:     Preparation:  Patient was prepped and draped in usual sterile fashion  Exploration:     Hemostasis achieved with:  Direct pressure    Wound exploration: entire depth of wound visualized    Treatment:     Area cleansed with:  Povidone-iodine    Amount of cleaning:  Standard    Irrigation solution:

## 2023-06-17 NOTE — DISCHARGE INSTRUCTIONS
CT scan of face, head and neck looked good. Did have a fracture of the fifth metacarpal (hand bone on pinky side). Laceration to both right hand and inner lip were repaired. Hand sutures will need to be removed in about 7 to 10 days. Lip laceration sutures are absorbable and can stay in. Referral has been placed to orthopedics regarding the hand fracture. Their office should call to set up an appointment. Please return with any worsening symptoms or concerns in the interim.

## 2023-06-19 ENCOUNTER — TELEPHONE (OUTPATIENT)
Dept: ORTHOPEDIC SURGERY | Age: 88
End: 2023-06-19

## 2023-06-26 ENCOUNTER — OFFICE VISIT (OUTPATIENT)
Dept: ORTHOPEDIC SURGERY | Age: 88
End: 2023-06-26

## 2023-06-26 DIAGNOSIS — S62.357A NONDISPLACED FRACTURE OF SHAFT OF FIFTH METACARPAL BONE, LEFT HAND, INITIAL ENCOUNTER FOR CLOSED FRACTURE: Primary | ICD-10-CM

## 2024-04-07 ENCOUNTER — APPOINTMENT (OUTPATIENT)
Dept: CT IMAGING | Age: 89
End: 2024-04-07
Payer: MEDICARE

## 2024-04-07 ENCOUNTER — HOSPITAL ENCOUNTER (EMERGENCY)
Age: 89
Discharge: HOME | End: 2024-04-07
Attending: EMERGENCY MEDICINE
Payer: MEDICARE

## 2024-04-07 ENCOUNTER — APPOINTMENT (OUTPATIENT)
Dept: GENERAL RADIOLOGY | Age: 89
End: 2024-04-07
Payer: MEDICARE

## 2024-04-07 VITALS
OXYGEN SATURATION: 100 % | WEIGHT: 115 LBS | DIASTOLIC BLOOD PRESSURE: 67 MMHG | HEIGHT: 60 IN | RESPIRATION RATE: 18 BRPM | SYSTOLIC BLOOD PRESSURE: 161 MMHG | HEART RATE: 71 BPM | TEMPERATURE: 98.1 F | BODY MASS INDEX: 22.58 KG/M2

## 2024-04-07 DIAGNOSIS — S09.90XA CLOSED HEAD INJURY, INITIAL ENCOUNTER: ICD-10-CM

## 2024-04-07 DIAGNOSIS — T68.XXXA HYPOTHERMIA, INITIAL ENCOUNTER: ICD-10-CM

## 2024-04-07 DIAGNOSIS — E86.0 DEHYDRATION: ICD-10-CM

## 2024-04-07 DIAGNOSIS — S42.291A OTHER CLOSED DISPLACED FRACTURE OF PROXIMAL END OF RIGHT HUMERUS, INITIAL ENCOUNTER: ICD-10-CM
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
## 2024-04-07 LAB
ALBUMIN SERPL-MCNC: 4.1 G/DL (ref 3.2–4.6)
ALBUMIN/GLOB SERPL: 1.2 (ref 0.4–1.6)
ALP SERPL-CCNC: 87 U/L (ref 50–136)
ALT SERPL-CCNC: 25 U/L (ref 12–65)
ANION GAP SERPL CALC-SCNC: 2 MMOL/L (ref 2–11)
ANION GAP SERPL CALC-SCNC: 4 MMOL/L (ref 2–11)
APPEARANCE UR: CLEAR
AST SERPL-CCNC: 22 U/L (ref 15–37)
BACTERIA URNS QL MICRO: NEGATIVE /HPF
BASOPHILS # BLD: 0 K/UL (ref 0–0.2)
BASOPHILS NFR BLD: 0 % (ref 0–2)
BILIRUB SERPL-MCNC: 0.5 MG/DL (ref 0.2–1.1)
BILIRUB UR QL: NEGATIVE
BUN SERPL-MCNC: 37 MG/DL (ref 8–23)
BUN SERPL-MCNC: 37 MG/DL (ref 8–23)
CALCIUM SERPL-MCNC: 8.8 MG/DL (ref 8.3–10.4)
CALCIUM SERPL-MCNC: 9.2 MG/DL (ref 8.3–10.4)
CASTS URNS QL MICRO: ABNORMAL /LPF
CHLORIDE SERPL-SCNC: 108 MMOL/L (ref 103–113)
CHLORIDE SERPL-SCNC: 112 MMOL/L (ref 103–113)
CO2 SERPL-SCNC: 29 MMOL/L (ref 21–32)
CO2 SERPL-SCNC: 31 MMOL/L (ref 21–32)
COLOR UR: ABNORMAL
CREAT SERPL-MCNC: 1.1 MG/DL (ref 0.6–1)
CREAT SERPL-MCNC: 1.2 MG/DL (ref 0.6–1)
DIFFERENTIAL METHOD BLD: ABNORMAL
EKG ATRIAL RATE: 121 BPM
EKG DIAGNOSIS: NORMAL
EKG Q-T INTERVAL: 461 MS
EKG QRS DURATION: 101 MS
EKG QTC CALCULATION (BAZETT): 480 MS
EKG R AXIS: 59 DEGREES
EKG T AXIS: 83 DEGREES
EKG VENTRICULAR RATE: 65 BPM
EOSINOPHIL # BLD: 0.1 K/UL (ref 0–0.8)
EOSINOPHIL NFR BLD: 1 % (ref 0.5–7.8)
EPI CELLS #/AREA URNS HPF: ABNORMAL /HPF
ERYTHROCYTE [DISTWIDTH] IN BLOOD BY AUTOMATED COUNT: 13.1 % (ref 11.9–14.6)
GLOBULIN SER CALC-MCNC: 3.4 G/DL (ref 2.8–4.5)
GLUCOSE BLD STRIP.AUTO-MCNC: 162 MG/DL (ref 65–100)
GLUCOSE SERPL-MCNC: 145 MG/DL (ref 65–100)
GLUCOSE SERPL-MCNC: 170 MG/DL (ref 65–100)
GLUCOSE UR STRIP.AUTO-MCNC: NEGATIVE MG/DL
HCT VFR BLD AUTO: 41.6 % (ref 35.8–46.3)
HGB BLD-MCNC: 13.6 G/DL (ref 11.7–15.4)
HGB UR QL STRIP: NEGATIVE
IMM GRANULOCYTES # BLD AUTO: 0.1 K/UL (ref 0–0.5)
IMM GRANULOCYTES NFR BLD AUTO: 1 % (ref 0–5)
KETONES UR QL STRIP.AUTO: ABNORMAL MG/DL
LEUKOCYTE ESTERASE UR QL STRIP.AUTO: NEGATIVE
LYMPHOCYTES # BLD: 1.4 K/UL (ref 0.5–4.6)
LYMPHOCYTES NFR BLD: 15 % (ref 13–44)
MCH RBC QN AUTO: 31.6 PG (ref 26.1–32.9)
MCHC RBC AUTO-ENTMCNC: 32.7 G/DL (ref 31.4–35)
MCV RBC AUTO: 96.5 FL (ref 82–102)
MONOCYTES # BLD: 0.4 K/UL (ref 0.1–1.3)
MONOCYTES NFR BLD: 4 % (ref 4–12)
NEUTS SEG # BLD: 7.5 K/UL (ref 1.7–8.2)
NEUTS SEG NFR BLD: 79 % (ref 43–78)
NITRITE UR QL STRIP.AUTO: NEGATIVE
NRBC # BLD: 0 K/UL (ref 0–0.2)
PH UR STRIP: 6.5 (ref 5–9)
PLATELET # BLD AUTO: 163 K/UL (ref 150–450)
PMV BLD AUTO: 10.1 FL (ref 9.4–12.3)
POTASSIUM SERPL-SCNC: 3.7 MMOL/L (ref 3.5–5.1)
POTASSIUM SERPL-SCNC: 4.1 MMOL/L (ref 3.5–5.1)
PROT SERPL-MCNC: 7.5 G/DL (ref 6.3–8.2)
PROT UR STRIP-MCNC: ABNORMAL MG/DL
RBC # BLD AUTO: 4.31 M/UL (ref 4.05–5.2)
RBC #/AREA URNS HPF: ABNORMAL /HPF
SERVICE CMNT-IMP: ABNORMAL
SODIUM SERPL-SCNC: 143 MMOL/L (ref 136–146)
SODIUM SERPL-SCNC: 143 MMOL/L (ref 136–146)
SP GR UR REFRACTOMETRY: 1.02 (ref 1–1.02)
TROPONIN I SERPL HS-MCNC: 12.6 PG/ML (ref 0–14)
UROBILINOGEN UR QL STRIP.AUTO: 0.2 EU/DL (ref 0.2–1)
WBC # BLD AUTO: 9.5 K/UL (ref 4.3–11.1)
WBC URNS QL MICRO: ABNORMAL /HPF

## 2024-04-07 PROCEDURE — 99285 EMERGENCY DEPT VISIT HI MDM: CPT

## 2024-04-07 PROCEDURE — 85025 COMPLETE CBC W/AUTO DIFF WBC: CPT

## 2024-04-07 PROCEDURE — 70450 CT HEAD/BRAIN W/O DYE: CPT

## 2024-04-07 PROCEDURE — 93005 ELECTROCARDIOGRAM TRACING: CPT | Performed by: EMERGENCY MEDICINE

## 2024-04-07 PROCEDURE — 81001 URINALYSIS AUTO W/SCOPE: CPT

## 2024-04-07 PROCEDURE — 96360 HYDRATION IV INFUSION INIT: CPT

## 2024-04-07 PROCEDURE — 96361 HYDRATE IV INFUSION ADD-ON: CPT

## 2024-04-07 PROCEDURE — 73030 X-RAY EXAM OF SHOULDER: CPT

## 2024-04-07 PROCEDURE — 84484 ASSAY OF TROPONIN QUANT: CPT

## 2024-04-07 PROCEDURE — 80053 COMPREHEN METABOLIC PANEL: CPT

## 2024-04-07 PROCEDURE — 93010 ELECTROCARDIOGRAM REPORT: CPT | Performed by: INTERNAL MEDICINE

## 2024-04-07 PROCEDURE — 82962 GLUCOSE BLOOD TEST: CPT

## 2024-04-07 PROCEDURE — 2580000003 HC RX 258: Performed by: EMERGENCY MEDICINE

## 2024-04-07 PROCEDURE — 72125 CT NECK SPINE W/O DYE: CPT

## 2024-04-07 PROCEDURE — 71045 X-RAY EXAM CHEST 1 VIEW: CPT

## 2024-04-07 RX ORDER — 0.9 % SODIUM CHLORIDE 0.9 %
500 INTRAVENOUS SOLUTION INTRAVENOUS ONCE
Status: COMPLETED | OUTPATIENT
Start: 2024-04-07 | End: 2024-04-07

## 2024-04-07 RX ADMIN — SODIUM CHLORIDE 500 ML: 9 INJECTION, SOLUTION INTRAVENOUS at 08:25

## 2024-04-07 NOTE — ED PROVIDER NOTES
back: Normal range of motion.      Right lower leg: No edema.      Left lower leg: No edema.      Comments: Her bilateral feet are cold and erythematous to palpation.  Distal pulses intact.  Tenderness palpation of bilateral feet  Right shoulder with tenderness palpation.  Difficulty with movement.  Possible deformity with increased anterior location compared to the left.  Distal pulses in bilateral upper extremities are intact.  There is no pain noted at the right wrist hand or elbow.  Clavicles are equal bilaterally and without deformity.  No midline tenderness or deformity or step-off noted at the C, T, L-spine   Skin:     General: Skin is warm.      Capillary Refill: Capillary refill takes less than 2 seconds.   Neurological:      General: No focal deficit present.      Mental Status: She is alert. Mental status is at baseline.      Cranial Nerves: No cranial nerve deficit.      Sensory: No sensory deficit.      Motor: No weakness.        Procedures     Procedures    Orders Placed This Encounter   Procedures    CT Head W/O Contrast    CT CSpine W/O Contrast    XR CHEST PORTABLE    XR SHOULDER RIGHT (MIN 2 VIEWS)    CBC with Auto Differential    CMP    Troponin    Urinalysis    [facility]    Inpatient consult to Case Management    POCT Glucose    EKG 12 Lead (Chest Pain)    [facility] Sling and Swathe, Right        Medications given during this emergency department visit:  Medications   sodium chloride 0.9 % bolus 500 mL (0 mLs IntraVENous Stopped 4/7/24 1004)       New Prescriptions    No medications on file        Past Medical History:   Diagnosis Date    Abnormal EKG 8/30/2021    Acute pulmonary edema (HCC) 2/9/2018    Acute respiratory failure with hypoxia (HCC) 2/9/2018    Atrial fibrillation (HCC)     CAD (coronary artery disease)     afib    Chest pain 9/13/2016    Chest pain 9/13/2016    Diastolic CHF, acute on chronic (HCC) 2/10/2018    RUBY (generalized

## 2024-04-07 NOTE — ED NOTES
Patient's temperature improved- patient is taken off of the blanket warmer per Dr. Guillory. Patient is continuing to receive warm fluids.      Kalani Amaro RN  04/07/24 0912

## 2024-04-07 NOTE — DISCHARGE INSTRUCTIONS
Your workup today showed you have a right upper arm fracture.  Please wear the sling and swath until you are seen by orthopedics.  I have sent a referral.  The office will call you for an appointment.  Keep an eye out for a phone call from our case management team.  Take Tylenol 500 mg every 6-8 hours as needed for pain.  Your kidney function was mildly decreased prior to the IV fluid given today.  Please follow-up with your primary care doctor in the next 3 to 7 days to recheck.

## 2024-04-07 NOTE — ED NOTES
Patient mobility status   stretcher . Provider aware     I have reviewed discharge instructions with the son and [employer].  The [facility] verbalized understanding.    Patient left ED via Discharge Method: stretcher to Skilled nursing facility with  [facility] .    Opportunity for questions and clarification provided.     Patient given 0 scripts.            Kalani Amaro, RN  04/07/24 2389

## 2024-04-07 NOTE — ED TRIAGE NOTES
Per EMS: patient is coming from [facility] on [address]- patient was found outside on the ground with a brief and a t-shirt. Patient has dementia- right shoulder pain- alert and oriented x1.    Per EMS: patient is DNR and patient is at her baseline.     66hr ,146/96, was not able to get bgl on the patient per ems

## 2024-04-08 ENCOUNTER — TELEPHONE (OUTPATIENT)
Dept: ORTHOPEDIC SURGERY | Age: 89
End: 2024-04-08

## 2024-04-08 NOTE — TELEPHONE ENCOUNTER
ER  encounter Other closed displaced fracture of proximal end of right humerus, initial encounter     This  was  a dr friend  patient  last  year  for  something.    Can  you  pull this  ER  encounter and  see if  this is for  MEF?    Pt is in a sling